# Patient Record
Sex: FEMALE | Race: WHITE | NOT HISPANIC OR LATINO | Employment: FULL TIME | URBAN - NONMETROPOLITAN AREA
[De-identification: names, ages, dates, MRNs, and addresses within clinical notes are randomized per-mention and may not be internally consistent; named-entity substitution may affect disease eponyms.]

---

## 2023-10-07 ENCOUNTER — APPOINTMENT (OUTPATIENT)
Dept: RADIOLOGY | Facility: HOSPITAL | Age: 53
End: 2023-10-07
Payer: MEDICAID

## 2023-10-07 ENCOUNTER — HOSPITAL ENCOUNTER (EMERGENCY)
Facility: HOSPITAL | Age: 53
Discharge: HOME | End: 2023-10-07
Payer: MEDICAID

## 2023-10-07 VITALS
SYSTOLIC BLOOD PRESSURE: 124 MMHG | HEART RATE: 72 BPM | RESPIRATION RATE: 18 BRPM | DIASTOLIC BLOOD PRESSURE: 91 MMHG | BODY MASS INDEX: 18.65 KG/M2 | OXYGEN SATURATION: 92 % | WEIGHT: 95 LBS | TEMPERATURE: 98.6 F | HEIGHT: 60 IN

## 2023-10-07 DIAGNOSIS — S82.891A CLOSED FRACTURE OF RIGHT ANKLE, INITIAL ENCOUNTER: ICD-10-CM

## 2023-10-07 DIAGNOSIS — S93.402A SPRAIN OF LEFT ANKLE, INITIAL ENCOUNTER: Primary | ICD-10-CM

## 2023-10-07 PROCEDURE — 73630 X-RAY EXAM OF FOOT: CPT | Mod: RIGHT SIDE | Performed by: RADIOLOGY

## 2023-10-07 PROCEDURE — 73610 X-RAY EXAM OF ANKLE: CPT | Mod: RT,FY

## 2023-10-07 PROCEDURE — 72125 CT NECK SPINE W/O DYE: CPT | Performed by: RADIOLOGY

## 2023-10-07 PROCEDURE — 73610 X-RAY EXAM OF ANKLE: CPT | Mod: LEFT SIDE | Performed by: RADIOLOGY

## 2023-10-07 PROCEDURE — 70450 CT HEAD/BRAIN W/O DYE: CPT

## 2023-10-07 PROCEDURE — 29515 APPLICATION SHORT LEG SPLINT: CPT | Mod: RT

## 2023-10-07 PROCEDURE — 73630 X-RAY EXAM OF FOOT: CPT | Mod: LEFT SIDE | Performed by: RADIOLOGY

## 2023-10-07 PROCEDURE — 70450 CT HEAD/BRAIN W/O DYE: CPT | Performed by: RADIOLOGY

## 2023-10-07 PROCEDURE — 73630 X-RAY EXAM OF FOOT: CPT | Mod: RT,FY

## 2023-10-07 PROCEDURE — 99285 EMERGENCY DEPT VISIT HI MDM: CPT | Mod: 25

## 2023-10-07 PROCEDURE — 96372 THER/PROPH/DIAG INJ SC/IM: CPT

## 2023-10-07 PROCEDURE — 71045 X-RAY EXAM CHEST 1 VIEW: CPT

## 2023-10-07 PROCEDURE — 2500000004 HC RX 250 GENERAL PHARMACY W/ HCPCS (ALT 636 FOR OP/ED): Mod: SE

## 2023-10-07 PROCEDURE — 73630 X-RAY EXAM OF FOOT: CPT | Mod: LT

## 2023-10-07 PROCEDURE — 76377 3D RENDER W/INTRP POSTPROCES: CPT | Mod: RIGHT SIDE | Performed by: RADIOLOGY

## 2023-10-07 PROCEDURE — 73700 CT LOWER EXTREMITY W/O DYE: CPT | Mod: RT

## 2023-10-07 PROCEDURE — 73610 X-RAY EXAM OF ANKLE: CPT | Mod: LT

## 2023-10-07 PROCEDURE — 73610 X-RAY EXAM OF ANKLE: CPT | Mod: RIGHT SIDE | Performed by: RADIOLOGY

## 2023-10-07 PROCEDURE — 72125 CT NECK SPINE W/O DYE: CPT

## 2023-10-07 PROCEDURE — 71045 X-RAY EXAM CHEST 1 VIEW: CPT | Performed by: RADIOLOGY

## 2023-10-07 PROCEDURE — 73700 CT LOWER EXTREMITY W/O DYE: CPT | Mod: RIGHT SIDE | Performed by: RADIOLOGY

## 2023-10-07 RX ORDER — ACETAMINOPHEN 325 MG/1
650 TABLET ORAL ONCE
Status: COMPLETED | OUTPATIENT
Start: 2023-10-07 | End: 2023-10-07

## 2023-10-07 RX ORDER — KETOROLAC TROMETHAMINE 30 MG/ML
15 INJECTION, SOLUTION INTRAMUSCULAR; INTRAVENOUS ONCE
Status: DISCONTINUED | OUTPATIENT
Start: 2023-10-07 | End: 2023-10-07 | Stop reason: HOSPADM

## 2023-10-07 RX ORDER — KETOROLAC TROMETHAMINE 15 MG/ML
INJECTION, SOLUTION INTRAMUSCULAR; INTRAVENOUS
Status: COMPLETED
Start: 2023-10-07 | End: 2023-10-07

## 2023-10-07 RX ORDER — ACETAMINOPHEN 325 MG/1
TABLET ORAL
Status: COMPLETED
Start: 2023-10-07 | End: 2023-10-07

## 2023-10-07 RX ADMIN — KETOROLAC TROMETHAMINE 15 MG: 15 INJECTION INTRAMUSCULAR; INTRAVENOUS at 13:16

## 2023-10-07 RX ADMIN — ACETAMINOPHEN 650 MG: 325 TABLET ORAL at 12:05

## 2023-10-07 ASSESSMENT — PAIN DESCRIPTION - PAIN TYPE: TYPE: ACUTE PAIN

## 2023-10-07 ASSESSMENT — PAIN DESCRIPTION - DESCRIPTORS: DESCRIPTORS: ACHING

## 2023-10-07 ASSESSMENT — COLUMBIA-SUICIDE SEVERITY RATING SCALE - C-SSRS
6. HAVE YOU EVER DONE ANYTHING, STARTED TO DO ANYTHING, OR PREPARED TO DO ANYTHING TO END YOUR LIFE?: NO
2. HAVE YOU ACTUALLY HAD ANY THOUGHTS OF KILLING YOURSELF?: NO
1. IN THE PAST MONTH, HAVE YOU WISHED YOU WERE DEAD OR WISHED YOU COULD GO TO SLEEP AND NOT WAKE UP?: NO

## 2023-10-07 ASSESSMENT — PAIN - FUNCTIONAL ASSESSMENT: PAIN_FUNCTIONAL_ASSESSMENT: 0-10

## 2023-10-07 ASSESSMENT — PAIN SCALES - GENERAL
PAINLEVEL_OUTOF10: 9

## 2023-10-07 ASSESSMENT — PAIN DESCRIPTION - FREQUENCY: FREQUENCY: CONSTANT/CONTINUOUS
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
## 2023-10-07 NOTE — ED NOTES
Radiology tests resulted, right bimalleolar ankle fracture. Plan for CT. Provider at bedside.     Geovanna Tomkpins RN  10/07/23 7053
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
## 2023-10-17 ENCOUNTER — HOSPITAL ENCOUNTER (OUTPATIENT)
Facility: HOSPITAL | Age: 53
Setting detail: OBSERVATION
Discharge: HOME | End: 2023-10-19
Attending: INTERNAL MEDICINE | Admitting: PHYSICIAN ASSISTANT
Payer: MEDICAID

## 2023-10-17 DIAGNOSIS — S82.841A BIMALLEOLAR FRACTURE OF RIGHT ANKLE, CLOSED, INITIAL ENCOUNTER: Primary | ICD-10-CM

## 2023-10-17 PROBLEM — F17.210 DEPENDENCE ON NICOTINE FROM CIGARETTES: Status: ACTIVE | Noted: 2023-06-20

## 2023-10-17 PROBLEM — F12.20 CANNABIS DEPENDENCE (MULTI): Chronic | Status: ACTIVE | Noted: 2023-05-24

## 2023-10-17 PROBLEM — J45.20 MILD INTERMITTENT ASTHMA, UNCOMPLICATED (HHS-HCC): Status: ACTIVE | Noted: 2018-06-10

## 2023-10-17 PROBLEM — F32.A DEPRESSION: Status: ACTIVE | Noted: 2022-08-04

## 2023-10-17 PROBLEM — F43.25 ADJUSTMENT DISORDER WITH MIXED DISTURBANCE OF EMOTIONS AND CONDUCT: Status: ACTIVE | Noted: 2023-05-24

## 2023-10-17 PROCEDURE — 71045 X-RAY EXAM CHEST 1 VIEW: CPT | Performed by: STUDENT IN AN ORGANIZED HEALTH CARE EDUCATION/TRAINING PROGRAM

## 2023-10-17 PROCEDURE — 2500000004 HC RX 250 GENERAL PHARMACY W/ HCPCS (ALT 636 FOR OP/ED): Performed by: PHYSICIAN ASSISTANT

## 2023-10-17 PROCEDURE — G0378 HOSPITAL OBSERVATION PER HR: HCPCS

## 2023-10-17 PROCEDURE — G0379 DIRECT REFER HOSPITAL OBSERV: HCPCS

## 2023-10-17 PROCEDURE — 87635 SARS-COV-2 COVID-19 AMP PRB: CPT | Performed by: PHYSICIAN ASSISTANT

## 2023-10-17 PROCEDURE — 1100000001 HC PRIVATE ROOM DAILY

## 2023-10-17 PROCEDURE — 2500000001 HC RX 250 WO HCPCS SELF ADMINISTERED DRUGS (ALT 637 FOR MEDICARE OP): Performed by: PHYSICIAN ASSISTANT

## 2023-10-17 RX ORDER — RISPERIDONE 1 MG/1
1 TABLET ORAL 2 TIMES DAILY
COMMUNITY

## 2023-10-17 RX ORDER — ARIPIPRAZOLE 5 MG/1
10 TABLET ORAL
Status: DISCONTINUED | OUTPATIENT
Start: 2023-10-18 | End: 2023-10-19 | Stop reason: HOSPADM

## 2023-10-17 RX ORDER — POLYETHYLENE GLYCOL 3350 17 G/17G
17 POWDER, FOR SOLUTION ORAL DAILY
Status: DISCONTINUED | OUTPATIENT
Start: 2023-10-18 | End: 2023-10-19 | Stop reason: HOSPADM

## 2023-10-17 RX ORDER — ARIPIPRAZOLE 10 MG/1
10 TABLET ORAL
COMMUNITY
Start: 2023-10-05

## 2023-10-17 RX ORDER — ACETAMINOPHEN 650 MG/1
650 SUPPOSITORY RECTAL EVERY 4 HOURS PRN
Status: DISCONTINUED | OUTPATIENT
Start: 2023-10-17 | End: 2023-10-19 | Stop reason: HOSPADM

## 2023-10-17 RX ORDER — FLUOXETINE HYDROCHLORIDE 20 MG/1
20 CAPSULE ORAL
COMMUNITY
Start: 2023-06-20

## 2023-10-17 RX ORDER — TRAZODONE HYDROCHLORIDE 50 MG/1
50 TABLET ORAL NIGHTLY PRN
Status: DISCONTINUED | OUTPATIENT
Start: 2023-10-17 | End: 2023-10-19 | Stop reason: HOSPADM

## 2023-10-17 RX ORDER — METRONIDAZOLE 500 MG/1
500 TABLET ORAL 3 TIMES DAILY
COMMUNITY
Start: 2023-04-17 | End: 2023-10-19 | Stop reason: HOSPADM

## 2023-10-17 RX ORDER — ENOXAPARIN SODIUM 100 MG/ML
40 INJECTION SUBCUTANEOUS EVERY 24 HOURS
Status: DISCONTINUED | OUTPATIENT
Start: 2023-10-19 | End: 2023-10-19 | Stop reason: HOSPADM

## 2023-10-17 RX ORDER — TALC
3 POWDER (GRAM) TOPICAL DAILY
Status: DISCONTINUED | OUTPATIENT
Start: 2023-10-17 | End: 2023-10-19 | Stop reason: HOSPADM

## 2023-10-17 RX ORDER — RISPERIDONE 1 MG/1
1 TABLET ORAL 2 TIMES DAILY
Status: DISCONTINUED | OUTPATIENT
Start: 2023-10-17 | End: 2023-10-19 | Stop reason: HOSPADM

## 2023-10-17 RX ORDER — BUSPIRONE HYDROCHLORIDE 10 MG/1
10 TABLET ORAL 2 TIMES DAILY
COMMUNITY
Start: 2023-05-03

## 2023-10-17 RX ORDER — FAMOTIDINE 20 MG/1
20 TABLET, FILM COATED ORAL 2 TIMES DAILY
Status: DISCONTINUED | OUTPATIENT
Start: 2023-10-17 | End: 2023-10-19 | Stop reason: HOSPADM

## 2023-10-17 RX ORDER — ONDANSETRON HYDROCHLORIDE 2 MG/ML
4 INJECTION, SOLUTION INTRAVENOUS EVERY 8 HOURS PRN
Status: DISCONTINUED | OUTPATIENT
Start: 2023-10-17 | End: 2023-10-19 | Stop reason: HOSPADM

## 2023-10-17 RX ORDER — ONDANSETRON 4 MG/1
4 TABLET, FILM COATED ORAL EVERY 8 HOURS PRN
Status: DISCONTINUED | OUTPATIENT
Start: 2023-10-17 | End: 2023-10-19 | Stop reason: HOSPADM

## 2023-10-17 RX ORDER — IBUPROFEN 200 MG
1 TABLET ORAL DAILY
Status: DISCONTINUED | OUTPATIENT
Start: 2023-10-18 | End: 2023-10-19 | Stop reason: HOSPADM

## 2023-10-17 RX ORDER — ACETAMINOPHEN 325 MG/1
650 TABLET ORAL EVERY 4 HOURS PRN
Status: DISCONTINUED | OUTPATIENT
Start: 2023-10-17 | End: 2023-10-19 | Stop reason: HOSPADM

## 2023-10-17 RX ORDER — FLUOXETINE HYDROCHLORIDE 20 MG/1
20 CAPSULE ORAL
Status: DISCONTINUED | OUTPATIENT
Start: 2023-10-18 | End: 2023-10-19 | Stop reason: HOSPADM

## 2023-10-17 RX ORDER — FAMOTIDINE 10 MG/ML
20 INJECTION INTRAVENOUS 2 TIMES DAILY
Status: DISCONTINUED | OUTPATIENT
Start: 2023-10-17 | End: 2023-10-19 | Stop reason: HOSPADM

## 2023-10-17 RX ORDER — ACETAMINOPHEN 160 MG/5ML
650 SOLUTION ORAL EVERY 4 HOURS PRN
Status: DISCONTINUED | OUTPATIENT
Start: 2023-10-17 | End: 2023-10-19 | Stop reason: HOSPADM

## 2023-10-17 RX ORDER — BUSPIRONE HYDROCHLORIDE 10 MG/1
10 TABLET ORAL 2 TIMES DAILY
Status: DISCONTINUED | OUTPATIENT
Start: 2023-10-17 | End: 2023-10-19 | Stop reason: HOSPADM

## 2023-10-17 RX ORDER — IBUPROFEN 800 MG/1
800 TABLET ORAL EVERY 8 HOURS PRN
COMMUNITY
Start: 2014-04-17

## 2023-10-17 RX ORDER — BACLOFEN 10 MG/1
10 TABLET ORAL DAILY PRN
Status: DISCONTINUED | OUTPATIENT
Start: 2023-10-17 | End: 2023-10-19 | Stop reason: HOSPADM

## 2023-10-17 RX ORDER — ALBUTEROL SULFATE 0.83 MG/ML
2.5 SOLUTION RESPIRATORY (INHALATION) EVERY 6 HOURS PRN
Status: DISCONTINUED | OUTPATIENT
Start: 2023-10-17 | End: 2023-10-19 | Stop reason: HOSPADM

## 2023-10-17 RX ORDER — ACETAMINOPHEN 325 MG/1
650 TABLET ORAL EVERY 4 HOURS PRN
Status: DISCONTINUED | OUTPATIENT
Start: 2023-10-17 | End: 2023-10-18

## 2023-10-17 RX ORDER — IBUPROFEN 200 MG
1 TABLET ORAL EVERY 24 HOURS
COMMUNITY
Start: 2014-04-17

## 2023-10-17 RX ORDER — GABAPENTIN 300 MG/1
300 CAPSULE ORAL EVERY 8 HOURS SCHEDULED
Status: DISCONTINUED | OUTPATIENT
Start: 2023-10-17 | End: 2023-10-19 | Stop reason: HOSPADM

## 2023-10-17 RX ORDER — NORETHINDRONE 5 MG/1
5 TABLET ORAL
COMMUNITY
Start: 2020-09-21

## 2023-10-17 RX ORDER — ACETAMINOPHEN 160 MG/5ML
650 SOLUTION ORAL EVERY 4 HOURS PRN
Status: DISCONTINUED | OUTPATIENT
Start: 2023-10-17 | End: 2023-10-18

## 2023-10-17 RX ORDER — TRAZODONE HYDROCHLORIDE 50 MG/1
1 TABLET ORAL NIGHTLY PRN
COMMUNITY
Start: 2023-10-16

## 2023-10-17 RX ORDER — GABAPENTIN 300 MG/1
300 CAPSULE ORAL 3 TIMES DAILY
COMMUNITY
Start: 2022-08-07 | End: 2023-11-05

## 2023-10-17 RX ORDER — NORETHINDRONE 5 MG/1
5 TABLET ORAL
Status: DISCONTINUED | OUTPATIENT
Start: 2023-10-18 | End: 2023-10-18

## 2023-10-17 RX ORDER — BACLOFEN 10 MG/1
10 TABLET ORAL DAILY PRN
COMMUNITY
Start: 2022-08-07

## 2023-10-17 RX ORDER — OXYCODONE HYDROCHLORIDE 5 MG/1
5 TABLET ORAL EVERY 4 HOURS PRN
Status: DISCONTINUED | OUTPATIENT
Start: 2023-10-17 | End: 2023-10-18

## 2023-10-17 RX ORDER — ACETAMINOPHEN 650 MG/1
650 SUPPOSITORY RECTAL EVERY 4 HOURS PRN
Status: DISCONTINUED | OUTPATIENT
Start: 2023-10-17 | End: 2023-10-18

## 2023-10-17 RX ADMIN — GABAPENTIN 300 MG: 300 CAPSULE ORAL at 23:48

## 2023-10-17 RX ADMIN — BUSPIRONE HYDROCHLORIDE 10 MG: 10 TABLET ORAL at 23:49

## 2023-10-17 RX ADMIN — RISPERIDONE 1 MG: 1 TABLET ORAL at 23:49

## 2023-10-17 RX ADMIN — OXYCODONE HYDROCHLORIDE 5 MG: 5 TABLET ORAL at 22:06

## 2023-10-17 RX ADMIN — Medication 3 MG: at 22:07

## 2023-10-17 RX ADMIN — FAMOTIDINE 20 MG: 20 TABLET, FILM COATED ORAL at 22:07

## 2023-10-17 SDOH — HEALTH STABILITY: PHYSICAL HEALTH: ON AVERAGE, HOW MANY MINUTES DO YOU ENGAGE IN EXERCISE AT THIS LEVEL?: 30 MIN

## 2023-10-17 SDOH — SOCIAL STABILITY: SOCIAL INSECURITY: WERE YOU ABLE TO COMPLETE ALL THE BEHAVIORAL HEALTH SCREENINGS?: YES

## 2023-10-17 SDOH — SOCIAL STABILITY: SOCIAL INSECURITY: HAVE YOU HAD THOUGHTS OF HARMING ANYONE ELSE?: NO

## 2023-10-17 SDOH — SOCIAL STABILITY: SOCIAL INSECURITY: DOES ANYONE TRY TO KEEP YOU FROM HAVING/CONTACTING OTHER FRIENDS OR DOING THINGS OUTSIDE YOUR HOME?: NO

## 2023-10-17 SDOH — SOCIAL STABILITY: SOCIAL INSECURITY: ARE THERE ANY APPARENT SIGNS OF INJURIES/BEHAVIORS THAT COULD BE RELATED TO ABUSE/NEGLECT?: NO

## 2023-10-17 SDOH — HEALTH STABILITY: MENTAL HEALTH: EXPERIENCED ANY OF THE FOLLOWING LIFE EVENTS: PHYSICAL ASSAULT

## 2023-10-17 SDOH — SOCIAL STABILITY: SOCIAL INSECURITY: DO YOU FEEL UNSAFE GOING BACK TO THE PLACE WHERE YOU ARE LIVING?: NO

## 2023-10-17 SDOH — SOCIAL STABILITY: SOCIAL INSECURITY: ARE YOU OR HAVE YOU BEEN THREATENED OR ABUSED PHYSICALLY, EMOTIONALLY, OR SEXUALLY BY ANYONE?: YES

## 2023-10-17 SDOH — SOCIAL STABILITY: SOCIAL INSECURITY: HAS ANYONE EVER THREATENED TO HURT YOUR FAMILY OR YOUR PETS?: NO

## 2023-10-17 SDOH — SOCIAL STABILITY: SOCIAL INSECURITY: DO YOU FEEL ANYONE HAS EXPLOITED OR TAKEN ADVANTAGE OF YOU FINANCIALLY OR OF YOUR PERSONAL PROPERTY?: NO

## 2023-10-17 SDOH — HEALTH STABILITY: PHYSICAL HEALTH: ON AVERAGE, HOW MANY DAYS PER WEEK DO YOU ENGAGE IN MODERATE TO STRENUOUS EXERCISE (LIKE A BRISK WALK)?: 7 DAYS

## 2023-10-17 SDOH — SOCIAL STABILITY: SOCIAL INSECURITY: ABUSE: ADULT

## 2023-10-17 ASSESSMENT — COGNITIVE AND FUNCTIONAL STATUS - GENERAL
HELP NEEDED FOR BATHING: A LITTLE
PATIENT BASELINE BEDBOUND: NO
MOBILITY SCORE: 21
WALKING IN HOSPITAL ROOM: A LITTLE
CLIMB 3 TO 5 STEPS WITH RAILING: A LOT
DRESSING REGULAR LOWER BODY CLOTHING: A LITTLE
DAILY ACTIVITIY SCORE: 22

## 2023-10-17 ASSESSMENT — ACTIVITIES OF DAILY LIVING (ADL)
ADEQUATE_TO_COMPLETE_ADL: YES
GROOMING: INDEPENDENT
JUDGMENT_ADEQUATE_SAFELY_COMPLETE_DAILY_ACTIVITIES: YES
ADEQUATE_TO_COMPLETE_ADL: YES
FEEDING YOURSELF: INDEPENDENT
LACK_OF_TRANSPORTATION: YES
TOILETING: INDEPENDENT
HEARING - LEFT EAR: FUNCTIONAL
BATHING: INDEPENDENT
DRESSING YOURSELF: INDEPENDENT
DRESSING YOURSELF: INDEPENDENT
JUDGMENT_ADEQUATE_SAFELY_COMPLETE_DAILY_ACTIVITIES: YES
WALKS IN HOME: NEEDS ASSISTANCE
BATHING: INDEPENDENT
GROOMING: INDEPENDENT
TOILETING: INDEPENDENT
PATIENT'S MEMORY ADEQUATE TO SAFELY COMPLETE DAILY ACTIVITIES?: YES
ASSISTIVE_DEVICE: EYEGLASSES;WALKER
PATIENT'S MEMORY ADEQUATE TO SAFELY COMPLETE DAILY ACTIVITIES?: YES
HEARING - RIGHT EAR: FUNCTIONAL
FEEDING YOURSELF: INDEPENDENT

## 2023-10-17 ASSESSMENT — ENCOUNTER SYMPTOMS
CHILLS: 0
FEVER: 0
ARTHRALGIAS: 1
SEIZURES: 0
SINUS PRESSURE: 0
DIZZINESS: 0
MYALGIAS: 1
APPETITE CHANGE: 0
CARDIOVASCULAR NEGATIVE: 1
PSYCHIATRIC NEGATIVE: 1
SINUS PAIN: 0
CONSTITUTIONAL NEGATIVE: 1
HEADACHES: 0
GASTROINTESTINAL NEGATIVE: 1
RESPIRATORY NEGATIVE: 1
FATIGUE: 0

## 2023-10-17 ASSESSMENT — LIFESTYLE VARIABLES
HOW MANY STANDARD DRINKS CONTAINING ALCOHOL DO YOU HAVE ON A TYPICAL DAY: 1 OR 2
AUDIT TOTAL SCORE: 1
HOW OFTEN DURING THE LAST YEAR HAVE YOU BEEN UNABLE TO REMEMBER WHAT HAPPENED THE NIGHT BEFORE BECAUSE YOU HAD BEEN DRINKING: LESS THAN MONTHLY
HAS A RELATIVE, FRIEND, DOCTOR, OR ANOTHER HEALTH PROFESSIONAL EXPRESSED CONCERN ABOUT YOUR DRINKING OR SUGGESTED YOU CUT DOWN: NO
HAVE YOU OR SOMEONE ELSE BEEN INJURED AS A RESULT OF YOUR DRINKING: NO
HOW OFTEN DURING THE LAST YEAR HAVE YOU FAILED TO DO WHAT WAS NORMALLY EXPECTED FROM YOU BECAUSE OF DRINKING: NEVER
PRESCIPTION_ABUSE_PAST_12_MONTHS: NO
AUDIT TOTAL SCORE: 5
HOW OFTEN DURING THE LAST YEAR HAVE YOU FOUND THAT YOU WERE NOT ABLE TO STOP DRINKING ONCE YOU HAD STARTED: NEVER
HOW OFTEN DURING THE LAST YEAR HAVE YOU HAD A FEELING OF GUILT OR REMORSE AFTER DRINKING: NEVER
HOW OFTEN DO YOU HAVE A DRINK CONTAINING ALCOHOL: 2-3 TIMES A WEEK
SKIP TO QUESTIONS 9-10: 0
HOW OFTEN DURING THE LAST YEAR HAVE YOU NEEDED AN ALCOHOLIC DRINK FIRST THING IN THE MORNING TO GET YOURSELF GOING AFTER A NIGHT OF HEAVY DRINKING: NEVER
SUBSTANCE_ABUSE_PAST_12_MONTHS: NO
AUDIT-C TOTAL SCORE: 4
AUDIT-C TOTAL SCORE: 4
HOW OFTEN DO YOU HAVE 6 OR MORE DRINKS ON ONE OCCASION: LESS THAN MONTHLY

## 2023-10-17 ASSESSMENT — COLUMBIA-SUICIDE SEVERITY RATING SCALE - C-SSRS
6. HAVE YOU EVER DONE ANYTHING, STARTED TO DO ANYTHING, OR PREPARED TO DO ANYTHING TO END YOUR LIFE?: NO
1. IN THE PAST MONTH, HAVE YOU WISHED YOU WERE DEAD OR WISHED YOU COULD GO TO SLEEP AND NOT WAKE UP?: NO
2. HAVE YOU ACTUALLY HAD ANY THOUGHTS OF KILLING YOURSELF?: NO

## 2023-10-17 ASSESSMENT — PAIN SCALES - GENERAL
PAINLEVEL_OUTOF10: 5 - MODERATE PAIN
PAINLEVEL_OUTOF10: 8
PAINLEVEL_OUTOF10: 8

## 2023-10-17 ASSESSMENT — PATIENT HEALTH QUESTIONNAIRE - PHQ9
1. LITTLE INTEREST OR PLEASURE IN DOING THINGS: SEVERAL DAYS
SUM OF ALL RESPONSES TO PHQ9 QUESTIONS 1 & 2: 2
2. FEELING DOWN, DEPRESSED OR HOPELESS: SEVERAL DAYS

## 2023-10-17 ASSESSMENT — PAIN DESCRIPTION - DESCRIPTORS
DESCRIPTORS: THROBBING
DESCRIPTORS: THROBBING

## 2023-10-17 ASSESSMENT — PAIN - FUNCTIONAL ASSESSMENT: PAIN_FUNCTIONAL_ASSESSMENT: 0-10

## 2023-10-18 ENCOUNTER — ANESTHESIA EVENT (OUTPATIENT)
Dept: OPERATING ROOM | Facility: HOSPITAL | Age: 53
End: 2023-10-18
Payer: MEDICAID

## 2023-10-18 ENCOUNTER — ANESTHESIA (OUTPATIENT)
Dept: OPERATING ROOM | Facility: HOSPITAL | Age: 53
End: 2023-10-18
Payer: MEDICAID

## 2023-10-18 ENCOUNTER — APPOINTMENT (OUTPATIENT)
Dept: RADIOLOGY | Facility: HOSPITAL | Age: 53
End: 2023-10-18
Payer: MEDICAID

## 2023-10-18 LAB
ALBUMIN SERPL BCP-MCNC: 3.3 G/DL (ref 3.4–5)
ALP SERPL-CCNC: 58 U/L (ref 33–110)
ALT SERPL W P-5'-P-CCNC: 9 U/L (ref 7–45)
ANION GAP SERPL CALC-SCNC: 13 MMOL/L (ref 10–20)
AST SERPL W P-5'-P-CCNC: 14 U/L (ref 9–39)
BILIRUB SERPL-MCNC: 0.3 MG/DL (ref 0–1.2)
BUN SERPL-MCNC: 12 MG/DL (ref 6–23)
CALCIUM SERPL-MCNC: 8.5 MG/DL (ref 8.6–10.3)
CHLORIDE SERPL-SCNC: 109 MMOL/L (ref 98–107)
CO2 SERPL-SCNC: 23 MMOL/L (ref 21–32)
CREAT SERPL-MCNC: 0.63 MG/DL (ref 0.5–1.05)
ERYTHROCYTE [DISTWIDTH] IN BLOOD BY AUTOMATED COUNT: 14 % (ref 11.5–14.5)
GFR SERPL CREATININE-BSD FRML MDRD: >90 ML/MIN/1.73M*2
GLUCOSE SERPL-MCNC: 89 MG/DL (ref 74–99)
HCG UR QL IA.RAPID: NEGATIVE
HCT VFR BLD AUTO: 37.5 % (ref 36–46)
HGB BLD-MCNC: 12 G/DL (ref 12–16)
MCH RBC QN AUTO: 31.4 PG (ref 26–34)
MCHC RBC AUTO-ENTMCNC: 32 G/DL (ref 32–36)
MCV RBC AUTO: 98 FL (ref 80–100)
NRBC BLD-RTO: 0 /100 WBCS (ref 0–0)
PLATELET # BLD AUTO: 375 X10*3/UL (ref 150–450)
PMV BLD AUTO: 8.1 FL (ref 7.5–11.5)
POTASSIUM SERPL-SCNC: 4.1 MMOL/L (ref 3.5–5.3)
PROT SERPL-MCNC: 5.8 G/DL (ref 6.4–8.2)
RBC # BLD AUTO: 3.82 X10*6/UL (ref 4–5.2)
SARS-COV-2 RNA RESP QL NAA+PROBE: NOT DETECTED
SODIUM SERPL-SCNC: 141 MMOL/L (ref 136–145)
WBC # BLD AUTO: 10.6 X10*3/UL (ref 4.4–11.3)

## 2023-10-18 PROCEDURE — 2500000001 HC RX 250 WO HCPCS SELF ADMINISTERED DRUGS (ALT 637 FOR MEDICARE OP): Performed by: PODIATRIST

## 2023-10-18 PROCEDURE — 36415 COLL VENOUS BLD VENIPUNCTURE: CPT | Performed by: PHYSICIAN ASSISTANT

## 2023-10-18 PROCEDURE — S4991 NICOTINE PATCH NONLEGEND: HCPCS | Performed by: PHYSICIAN ASSISTANT

## 2023-10-18 PROCEDURE — 85027 COMPLETE CBC AUTOMATED: CPT | Performed by: PHYSICIAN ASSISTANT

## 2023-10-18 PROCEDURE — 2500000001 HC RX 250 WO HCPCS SELF ADMINISTERED DRUGS (ALT 637 FOR MEDICARE OP): Performed by: PHYSICIAN ASSISTANT

## 2023-10-18 PROCEDURE — 2500000005 HC RX 250 GENERAL PHARMACY W/O HCPCS: Performed by: PODIATRIST

## 2023-10-18 PROCEDURE — 2500000001 HC RX 250 WO HCPCS SELF ADMINISTERED DRUGS (ALT 637 FOR MEDICARE OP)

## 2023-10-18 PROCEDURE — 7100000002 HC RECOVERY ROOM TIME - EACH INCREMENTAL 1 MINUTE: Performed by: PODIATRIST

## 2023-10-18 PROCEDURE — 93005 ELECTROCARDIOGRAM TRACING: CPT

## 2023-10-18 PROCEDURE — 71045 X-RAY EXAM CHEST 1 VIEW: CPT

## 2023-10-18 PROCEDURE — C1713 ANCHOR/SCREW BN/BN,TIS/BN: HCPCS | Performed by: PODIATRIST

## 2023-10-18 PROCEDURE — 93010 ELECTROCARDIOGRAM REPORT: CPT | Performed by: INTERNAL MEDICINE

## 2023-10-18 PROCEDURE — G0378 HOSPITAL OBSERVATION PER HR: HCPCS

## 2023-10-18 PROCEDURE — 3700000002 HC GENERAL ANESTHESIA TIME - EACH INCREMENTAL 1 MINUTE: Performed by: PODIATRIST

## 2023-10-18 PROCEDURE — 3600000004 HC OR TIME - INITIAL BASE CHARGE - PROCEDURE LEVEL FOUR: Performed by: PODIATRIST

## 2023-10-18 PROCEDURE — 1100000001 HC PRIVATE ROOM DAILY

## 2023-10-18 PROCEDURE — 2500000004 HC RX 250 GENERAL PHARMACY W/ HCPCS (ALT 636 FOR OP/ED): Performed by: PHYSICIAN ASSISTANT

## 2023-10-18 PROCEDURE — 80053 COMPREHEN METABOLIC PANEL: CPT | Performed by: PHYSICIAN ASSISTANT

## 2023-10-18 PROCEDURE — 3700000001 HC GENERAL ANESTHESIA TIME - INITIAL BASE CHARGE: Performed by: PODIATRIST

## 2023-10-18 PROCEDURE — 76000 FLUOROSCOPY <1 HR PHYS/QHP: CPT

## 2023-10-18 PROCEDURE — 2780000003 HC OR 278 NO HCPCS: Performed by: PODIATRIST

## 2023-10-18 PROCEDURE — 81025 URINE PREGNANCY TEST: CPT | Performed by: PHYSICIAN ASSISTANT

## 2023-10-18 PROCEDURE — 99233 SBSQ HOSP IP/OBS HIGH 50: CPT

## 2023-10-18 PROCEDURE — 3600000009 HC OR TIME - EACH INCREMENTAL 1 MINUTE - PROCEDURE LEVEL FOUR: Performed by: PODIATRIST

## 2023-10-18 PROCEDURE — 2500000002 HC RX 250 W HCPCS SELF ADMINISTERED DRUGS (ALT 637 FOR MEDICARE OP, ALT 636 FOR OP/ED): Performed by: PHYSICIAN ASSISTANT

## 2023-10-18 PROCEDURE — 2500000004 HC RX 250 GENERAL PHARMACY W/ HCPCS (ALT 636 FOR OP/ED): Performed by: NURSE ANESTHETIST, CERTIFIED REGISTERED

## 2023-10-18 PROCEDURE — 2720000007 HC OR 272 NO HCPCS: Performed by: PODIATRIST

## 2023-10-18 PROCEDURE — 7100000001 HC RECOVERY ROOM TIME - INITIAL BASE CHARGE: Performed by: PODIATRIST

## 2023-10-18 PROCEDURE — 2500000005 HC RX 250 GENERAL PHARMACY W/O HCPCS: Performed by: NURSE ANESTHETIST, CERTIFIED REGISTERED

## 2023-10-18 PROCEDURE — 2500000004 HC RX 250 GENERAL PHARMACY W/ HCPCS (ALT 636 FOR OP/ED)

## 2023-10-18 DEVICE — IMPLANTABLE DEVICE: Type: IMPLANTABLE DEVICE | Site: ANKLE | Status: FUNCTIONAL

## 2023-10-18 RX ORDER — NORETHINDRONE 5 MG/1
5 TABLET ORAL
Status: DISCONTINUED | OUTPATIENT
Start: 2023-10-18 | End: 2023-10-18

## 2023-10-18 RX ORDER — PHENYLEPHRINE HCL IN 0.9% NACL 0.4MG/10ML
SYRINGE (ML) INTRAVENOUS AS NEEDED
Status: DISCONTINUED | OUTPATIENT
Start: 2023-10-18 | End: 2023-10-18

## 2023-10-18 RX ORDER — ACETAMINOPHEN 325 MG/1
975 TABLET ORAL EVERY 8 HOURS
Status: DISCONTINUED | OUTPATIENT
Start: 2023-10-18 | End: 2023-10-19 | Stop reason: HOSPADM

## 2023-10-18 RX ORDER — KETOROLAC TROMETHAMINE 15 MG/ML
15 INJECTION, SOLUTION INTRAMUSCULAR; INTRAVENOUS EVERY 6 HOURS PRN
Status: DISCONTINUED | OUTPATIENT
Start: 2023-10-18 | End: 2023-10-19 | Stop reason: HOSPADM

## 2023-10-18 RX ORDER — OXYCODONE HYDROCHLORIDE 5 MG/1
5 TABLET ORAL EVERY 4 HOURS PRN
Status: DISCONTINUED | OUTPATIENT
Start: 2023-10-18 | End: 2023-10-19 | Stop reason: HOSPADM

## 2023-10-18 RX ORDER — BACITRACIN 500 [USP'U]/G
OINTMENT TOPICAL AS NEEDED
Status: DISCONTINUED | OUTPATIENT
Start: 2023-10-18 | End: 2023-10-18 | Stop reason: HOSPADM

## 2023-10-18 RX ORDER — GLYCOPYRROLATE 0.2 MG/ML
INJECTION INTRAMUSCULAR; INTRAVENOUS AS NEEDED
Status: DISCONTINUED | OUTPATIENT
Start: 2023-10-18 | End: 2023-10-18

## 2023-10-18 RX ORDER — CEFAZOLIN 1 G/1
INJECTION, POWDER, FOR SOLUTION INTRAVENOUS AS NEEDED
Status: DISCONTINUED | OUTPATIENT
Start: 2023-10-18 | End: 2023-10-18

## 2023-10-18 RX ORDER — PROPOFOL 10 MG/ML
INJECTION, EMULSION INTRAVENOUS AS NEEDED
Status: DISCONTINUED | OUTPATIENT
Start: 2023-10-18 | End: 2023-10-18

## 2023-10-18 RX ORDER — KETOROLAC TROMETHAMINE 30 MG/ML
INJECTION, SOLUTION INTRAMUSCULAR; INTRAVENOUS AS NEEDED
Status: DISCONTINUED | OUTPATIENT
Start: 2023-10-18 | End: 2023-10-18

## 2023-10-18 RX ORDER — SODIUM CHLORIDE, SODIUM LACTATE, POTASSIUM CHLORIDE, CALCIUM CHLORIDE 600; 310; 30; 20 MG/100ML; MG/100ML; MG/100ML; MG/100ML
100 INJECTION, SOLUTION INTRAVENOUS CONTINUOUS
Status: DISCONTINUED | OUTPATIENT
Start: 2023-10-18 | End: 2023-10-18 | Stop reason: HOSPADM

## 2023-10-18 RX ORDER — HYDROMORPHONE HYDROCHLORIDE 1 MG/ML
1 INJECTION, SOLUTION INTRAMUSCULAR; INTRAVENOUS; SUBCUTANEOUS EVERY 5 MIN PRN
Status: DISCONTINUED | OUTPATIENT
Start: 2023-10-18 | End: 2023-10-18 | Stop reason: HOSPADM

## 2023-10-18 RX ORDER — MIDAZOLAM HYDROCHLORIDE 1 MG/ML
INJECTION, SOLUTION INTRAMUSCULAR; INTRAVENOUS AS NEEDED
Status: DISCONTINUED | OUTPATIENT
Start: 2023-10-18 | End: 2023-10-18

## 2023-10-18 RX ORDER — FENTANYL CITRATE 50 UG/ML
INJECTION, SOLUTION INTRAMUSCULAR; INTRAVENOUS AS NEEDED
Status: DISCONTINUED | OUTPATIENT
Start: 2023-10-18 | End: 2023-10-18

## 2023-10-18 RX ORDER — BUPIVACAINE HCL/EPINEPHRINE 0.25-.0005
VIAL (ML) INJECTION AS NEEDED
Status: DISCONTINUED | OUTPATIENT
Start: 2023-10-18 | End: 2023-10-18 | Stop reason: HOSPADM

## 2023-10-18 RX ORDER — CEFAZOLIN 1 G/1
1 INJECTION, POWDER, FOR SOLUTION INTRAVENOUS EVERY 8 HOURS
Status: DISCONTINUED | OUTPATIENT
Start: 2023-10-19 | End: 2023-10-19

## 2023-10-18 RX ORDER — ONDANSETRON HYDROCHLORIDE 2 MG/ML
4 INJECTION, SOLUTION INTRAVENOUS ONCE AS NEEDED
Status: DISCONTINUED | OUTPATIENT
Start: 2023-10-18 | End: 2023-10-18 | Stop reason: HOSPADM

## 2023-10-18 RX ORDER — LIDOCAINE HYDROCHLORIDE 10 MG/ML
0.1 INJECTION, SOLUTION EPIDURAL; INFILTRATION; INTRACAUDAL; PERINEURAL ONCE
Status: DISCONTINUED | OUTPATIENT
Start: 2023-10-18 | End: 2023-10-18 | Stop reason: HOSPADM

## 2023-10-18 RX ORDER — HYDROCODONE BITARTRATE AND ACETAMINOPHEN 5; 325 MG/1; MG/1
1 TABLET ORAL EVERY 4 HOURS PRN
Status: DISCONTINUED | OUTPATIENT
Start: 2023-10-18 | End: 2023-10-19 | Stop reason: HOSPADM

## 2023-10-18 RX ORDER — NORETHINDRONE 5 MG/1
5 TABLET ORAL
Status: DISCONTINUED | OUTPATIENT
Start: 2023-10-19 | End: 2023-10-19 | Stop reason: HOSPADM

## 2023-10-18 RX ORDER — LIDOCAINE HYDROCHLORIDE 20 MG/ML
INJECTION, SOLUTION INFILTRATION; PERINEURAL AS NEEDED
Status: DISCONTINUED | OUTPATIENT
Start: 2023-10-18 | End: 2023-10-18

## 2023-10-18 RX ORDER — OXYCODONE HYDROCHLORIDE 5 MG/1
5 TABLET ORAL EVERY 4 HOURS PRN
Status: DISCONTINUED | OUTPATIENT
Start: 2023-10-18 | End: 2023-10-18 | Stop reason: HOSPADM

## 2023-10-18 RX ORDER — DEXAMETHASONE SODIUM PHOSPHATE 4 MG/ML
INJECTION, SOLUTION INTRA-ARTICULAR; INTRALESIONAL; INTRAMUSCULAR; INTRAVENOUS; SOFT TISSUE AS NEEDED
Status: DISCONTINUED | OUTPATIENT
Start: 2023-10-18 | End: 2023-10-18

## 2023-10-18 RX ADMIN — LIDOCAINE HYDROCHLORIDE 40 MG: 20 INJECTION, SOLUTION INFILTRATION; PERINEURAL at 19:29

## 2023-10-18 RX ADMIN — CEFAZOLIN 1 G: 330 INJECTION, POWDER, FOR SOLUTION INTRAMUSCULAR; INTRAVENOUS at 19:32

## 2023-10-18 RX ADMIN — BUSPIRONE HYDROCHLORIDE 10 MG: 10 TABLET ORAL at 09:27

## 2023-10-18 RX ADMIN — KETOROLAC TROMETHAMINE 30 MG: 30 INJECTION, SOLUTION INTRAMUSCULAR; INTRAVENOUS at 19:48

## 2023-10-18 RX ADMIN — MIDAZOLAM 2 MG: 1 INJECTION INTRAMUSCULAR; INTRAVENOUS at 19:21

## 2023-10-18 RX ADMIN — BUSPIRONE HYDROCHLORIDE 10 MG: 10 TABLET ORAL at 21:21

## 2023-10-18 RX ADMIN — ONDANSETRON 4 MG: 2 INJECTION INTRAMUSCULAR; INTRAVENOUS at 19:48

## 2023-10-18 RX ADMIN — SODIUM CHLORIDE, POTASSIUM CHLORIDE, SODIUM LACTATE AND CALCIUM CHLORIDE: 600; 310; 30; 20 INJECTION, SOLUTION INTRAVENOUS at 19:20

## 2023-10-18 RX ADMIN — FENTANYL CITRATE 50 MCG: 50 INJECTION, SOLUTION INTRAMUSCULAR; INTRAVENOUS at 19:45

## 2023-10-18 RX ADMIN — DEXAMETHASONE SODIUM PHOSPHATE 4 MG: 4 INJECTION, SOLUTION INTRAMUSCULAR; INTRAVENOUS at 19:48

## 2023-10-18 RX ADMIN — ACETAMINOPHEN 975 MG: 325 TABLET ORAL at 18:11

## 2023-10-18 RX ADMIN — ARIPIPRAZOLE 10 MG: 5 TABLET ORAL at 06:22

## 2023-10-18 RX ADMIN — Medication 3 MG: at 21:21

## 2023-10-18 RX ADMIN — FLUOXETINE 20 MG: 20 CAPSULE ORAL at 06:22

## 2023-10-18 RX ADMIN — GABAPENTIN 300 MG: 300 CAPSULE ORAL at 21:21

## 2023-10-18 RX ADMIN — OXYCODONE HYDROCHLORIDE 5 MG: 5 TABLET ORAL at 06:30

## 2023-10-18 RX ADMIN — PROPOFOL 120 MG: 10 INJECTION, EMULSION INTRAVENOUS at 19:29

## 2023-10-18 RX ADMIN — FAMOTIDINE 20 MG: 20 TABLET, FILM COATED ORAL at 21:21

## 2023-10-18 RX ADMIN — RISPERIDONE 1 MG: 1 TABLET ORAL at 09:27

## 2023-10-18 RX ADMIN — Medication 100 MCG: at 19:50

## 2023-10-18 RX ADMIN — ACETAMINOPHEN 975 MG: 325 TABLET ORAL at 12:53

## 2023-10-18 RX ADMIN — Medication 100 MCG: at 19:40

## 2023-10-18 RX ADMIN — PROPOFOL 80 MG: 10 INJECTION, EMULSION INTRAVENOUS at 19:45

## 2023-10-18 RX ADMIN — RISPERIDONE 1 MG: 1 TABLET ORAL at 21:21

## 2023-10-18 RX ADMIN — FAMOTIDINE 20 MG: 20 TABLET, FILM COATED ORAL at 09:27

## 2023-10-18 RX ADMIN — Medication 200 MCG: at 19:59

## 2023-10-18 RX ADMIN — GABAPENTIN 300 MG: 300 CAPSULE ORAL at 06:22

## 2023-10-18 RX ADMIN — GLYCOPYRROLATE 0.2 MG: 0.2 INJECTION INTRAMUSCULAR; INTRAVENOUS at 19:40

## 2023-10-18 RX ADMIN — NICOTINE 1 PATCH: 14 PATCH, EXTENDED RELEASE TRANSDERMAL at 09:26

## 2023-10-18 RX ADMIN — GABAPENTIN 300 MG: 300 CAPSULE ORAL at 14:46

## 2023-10-18 RX ADMIN — Medication 100 MCG: at 19:44

## 2023-10-18 RX ADMIN — FENTANYL CITRATE 50 MCG: 50 INJECTION, SOLUTION INTRAMUSCULAR; INTRAVENOUS at 19:35

## 2023-10-18 SDOH — HEALTH STABILITY: MENTAL HEALTH: CURRENT SMOKER: 1

## 2023-10-18 ASSESSMENT — PAIN DESCRIPTION - DESCRIPTORS: DESCRIPTORS: THROBBING

## 2023-10-18 ASSESSMENT — PAIN SCALES - GENERAL
PAINLEVEL_OUTOF10: 0 - NO PAIN
PAINLEVEL_OUTOF10: 7
PAIN_LEVEL: 0
PAINLEVEL_OUTOF10: 3
PAINLEVEL_OUTOF10: 3
PAINLEVEL_OUTOF10: 0 - NO PAIN

## 2023-10-18 ASSESSMENT — ENCOUNTER SYMPTOMS
CHEST TIGHTNESS: 0
ABDOMINAL PAIN: 0
SHORTNESS OF BREATH: 0
PALPITATIONS: 0
ACTIVITY CHANGE: 0
APPETITE CHANGE: 0
MYALGIAS: 0
DIFFICULTY URINATING: 0
DIZZINESS: 0
COUGH: 0

## 2023-10-18 NOTE — DISCHARGE INSTR - OTHER ORDERS
Thank you for choosing [facility] for your Health Care needs.  As you transition from the hospital back to home, we hope we took your preferences into account on how you manage your health needs so you can manage your health at home.  You may receive a survey in the mail within a couple weeks.  Please take the time to complete it and return it.  Your input is ALWAYS important to us.  Thank you!  Carmen Ding Debbie, & Jean  Your Care Transition team, [phone]     .
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
## 2023-10-18 NOTE — CARE PLAN
The patient's goals for the shift include      The clinical goals for the shift include decrease pain    Patient awaiting surgery today. Pain controlled with just tylenol this shift.  Remains in soft casts BLE

## 2023-10-18 NOTE — ANESTHESIA PROCEDURE NOTES
Airway  Date/Time: 10/18/2023 7:31 PM  Urgency: elective    Airway not difficult    Staffing  Performed: CRNA   Authorized by: CARLA Silva    Performed by: CARLA Silva  Patient location during procedure: OR    Indications and Patient Condition  Indications for airway management: anesthesia  Spontaneous ventilation: present  Sedation level: deep  Preoxygenated: yes  Patient position: sniffing  MILS maintained throughout  Mask difficulty assessment: 0 - not attempted    Final Airway Details  Final airway type: supraglottic airway      Successful airway: Supraglottic airway: I gel.  Size 3     Number of attempts at approach: 1
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
## 2023-10-18 NOTE — CARE PLAN
The patient's goals for the shift include  pain control.    The clinical goals for the shift include decrease pain    Over the shift, the patient did not make progress toward the following goals. Barriers to progression include patient 's lack of communication with staff when pain medication is needed. Recommendations to address these barriers include patient communicating with staff when pain medication is needed.     PAST MEDICAL HISTORY:  No pertinent past medical history

## 2023-10-18 NOTE — ANESTHESIA PREPROCEDURE EVALUATION
Patient: Eva Roca    Procedure Information       Date/Time: 10/18/23 1535    Procedures:       Open Reduction Internal Fixation Ankle (Right)      Repair Ligament Ankle/Foot (Left)    Location: GEN OR 01 / Virtual [facility] OR    Surgeons: Telly Quigley DPM            Relevant Problems   Cardiovascular (within normal limits)      Endocrine (within normal limits)      GI (within normal limits)      /Renal (within normal limits)      Neuro/Psych   (+) Anxiety   (+) Depression   (+) Seizure disorder (CMS/HCC)      Pulmonary   (+) Mild intermittent asthma, uncomplicated       Clinical information reviewed:    Allergies  Meds  Problems    OB Status           NPO Detail:  No data recorded     Physical Exam    Airway  Mallampati: II  TM distance: >3 FB  Neck ROM: full     Cardiovascular - normal exam     Dental    Pulmonary - normal exam     Abdominal - normal exam             Anesthesia Plan    ASA 3     general     The patient is a current smoker.  Patient was previously instructed to abstain from smoking on day of procedure.  Patient smoked on day of procedure.    intravenous induction   Postoperative administration of opioids is intended.  Anesthetic plan and risks discussed with patient.  Use of blood products discussed with patient who consented to blood products.    Plan discussed with CRNA.
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
## 2023-10-19 VITALS
HEART RATE: 90 BPM | BODY MASS INDEX: 18.96 KG/M2 | OXYGEN SATURATION: 96 % | HEIGHT: 60 IN | WEIGHT: 96.56 LBS | RESPIRATION RATE: 16 BRPM | TEMPERATURE: 97.9 F | DIASTOLIC BLOOD PRESSURE: 82 MMHG | SYSTOLIC BLOOD PRESSURE: 122 MMHG

## 2023-10-19 LAB
ANION GAP SERPL CALC-SCNC: 20 MMOL/L (ref 10–20)
BUN SERPL-MCNC: 16 MG/DL (ref 6–23)
CALCIUM SERPL-MCNC: 9.1 MG/DL (ref 8.6–10.3)
CHLORIDE SERPL-SCNC: 104 MMOL/L (ref 98–107)
CO2 SERPL-SCNC: 18 MMOL/L (ref 21–32)
CREAT SERPL-MCNC: 0.86 MG/DL (ref 0.5–1.05)
ERYTHROCYTE [DISTWIDTH] IN BLOOD BY AUTOMATED COUNT: 14.1 % (ref 11.5–14.5)
GFR SERPL CREATININE-BSD FRML MDRD: 81 ML/MIN/1.73M*2
GLUCOSE SERPL-MCNC: 153 MG/DL (ref 74–99)
HCT VFR BLD AUTO: 44.2 % (ref 36–46)
HGB BLD-MCNC: 13 G/DL (ref 12–16)
MCH RBC QN AUTO: 31.3 PG (ref 26–34)
MCHC RBC AUTO-ENTMCNC: 29.4 G/DL (ref 32–36)
MCV RBC AUTO: 107 FL (ref 80–100)
NRBC BLD-RTO: 0 /100 WBCS (ref 0–0)
PLATELET # BLD AUTO: 364 X10*3/UL (ref 150–450)
PMV BLD AUTO: 8.2 FL (ref 7.5–11.5)
POTASSIUM SERPL-SCNC: 4.5 MMOL/L (ref 3.5–5.3)
RBC # BLD AUTO: 4.15 X10*6/UL (ref 4–5.2)
SODIUM SERPL-SCNC: 137 MMOL/L (ref 136–145)
WBC # BLD AUTO: 9.8 X10*3/UL (ref 4.4–11.3)

## 2023-10-19 PROCEDURE — 96365 THER/PROPH/DIAG IV INF INIT: CPT | Mod: 59

## 2023-10-19 PROCEDURE — 2500000002 HC RX 250 W HCPCS SELF ADMINISTERED DRUGS (ALT 637 FOR MEDICARE OP, ALT 636 FOR OP/ED)

## 2023-10-19 PROCEDURE — 80048 BASIC METABOLIC PNL TOTAL CA: CPT

## 2023-10-19 PROCEDURE — 2500000001 HC RX 250 WO HCPCS SELF ADMINISTERED DRUGS (ALT 637 FOR MEDICARE OP)

## 2023-10-19 PROCEDURE — 2500000004 HC RX 250 GENERAL PHARMACY W/ HCPCS (ALT 636 FOR OP/ED)

## 2023-10-19 PROCEDURE — 96375 TX/PRO/DX INJ NEW DRUG ADDON: CPT

## 2023-10-19 PROCEDURE — G0378 HOSPITAL OBSERVATION PER HR: HCPCS

## 2023-10-19 PROCEDURE — 99239 HOSP IP/OBS DSCHRG MGMT >30: CPT | Performed by: NURSE PRACTITIONER

## 2023-10-19 PROCEDURE — 96372 THER/PROPH/DIAG INJ SC/IM: CPT

## 2023-10-19 PROCEDURE — 36415 COLL VENOUS BLD VENIPUNCTURE: CPT

## 2023-10-19 PROCEDURE — 85027 COMPLETE CBC AUTOMATED: CPT

## 2023-10-19 PROCEDURE — S4991 NICOTINE PATCH NONLEGEND: HCPCS

## 2023-10-19 PROCEDURE — 2500000004 HC RX 250 GENERAL PHARMACY W/ HCPCS (ALT 636 FOR OP/ED): Performed by: NURSE PRACTITIONER

## 2023-10-19 RX ORDER — HYDROCODONE BITARTRATE AND ACETAMINOPHEN 5; 325 MG/1; MG/1
1 TABLET ORAL EVERY 6 HOURS PRN
Qty: 12 TABLET | Refills: 0 | Status: SHIPPED | OUTPATIENT
Start: 2023-10-19 | End: 2023-10-22

## 2023-10-19 RX ORDER — CEFAZOLIN SODIUM 1 G/50ML
SOLUTION INTRAVENOUS
Status: COMPLETED
Start: 2023-10-19 | End: 2023-10-19

## 2023-10-19 RX ORDER — CEFAZOLIN SODIUM 1 G/50ML
1 SOLUTION INTRAVENOUS EVERY 8 HOURS
Status: DISCONTINUED | OUTPATIENT
Start: 2023-10-19 | End: 2023-10-19 | Stop reason: HOSPADM

## 2023-10-19 RX ADMIN — ACETAMINOPHEN 975 MG: 325 TABLET ORAL at 01:41

## 2023-10-19 RX ADMIN — FAMOTIDINE 20 MG: 20 TABLET, FILM COATED ORAL at 09:04

## 2023-10-19 RX ADMIN — POLYETHYLENE GLYCOL 3350 17 G: 17 POWDER, FOR SOLUTION ORAL at 09:05

## 2023-10-19 RX ADMIN — CEFAZOLIN SODIUM 1 G: 1 SOLUTION INTRAVENOUS at 11:44

## 2023-10-19 RX ADMIN — CEFAZOLIN SODIUM 1 G: 1 SOLUTION INTRAVENOUS at 04:18

## 2023-10-19 RX ADMIN — ACETAMINOPHEN 975 MG: 325 TABLET ORAL at 09:49

## 2023-10-19 RX ADMIN — ENOXAPARIN SODIUM 40 MG: 40 INJECTION SUBCUTANEOUS at 01:40

## 2023-10-19 RX ADMIN — FLUOXETINE 20 MG: 20 CAPSULE ORAL at 06:20

## 2023-10-19 RX ADMIN — BACLOFEN 10 MG: 10 TABLET ORAL at 09:03

## 2023-10-19 RX ADMIN — KETOROLAC TROMETHAMINE 15 MG: 15 INJECTION, SOLUTION INTRAMUSCULAR; INTRAVENOUS at 09:04

## 2023-10-19 RX ADMIN — GABAPENTIN 300 MG: 300 CAPSULE ORAL at 06:20

## 2023-10-19 RX ADMIN — BUSPIRONE HYDROCHLORIDE 10 MG: 10 TABLET ORAL at 09:04

## 2023-10-19 RX ADMIN — NICOTINE 1 PATCH: 14 PATCH, EXTENDED RELEASE TRANSDERMAL at 09:15

## 2023-10-19 RX ADMIN — ARIPIPRAZOLE 10 MG: 5 TABLET ORAL at 06:20

## 2023-10-19 RX ADMIN — RISPERIDONE 1 MG: 1 TABLET ORAL at 09:03

## 2023-10-19 ASSESSMENT — PAIN SCALES - PAIN ASSESSMENT IN ADVANCED DEMENTIA (PAINAD)
CONSOLABILITY: NO NEED TO CONSOLE
FACIALEXPRESSION: SMILING OR INEXPRESSIVE
BODYLANGUAGE: RELAXED
BREATHING: NORMAL

## 2023-10-19 ASSESSMENT — PAIN SCALES - GENERAL: PAINLEVEL_OUTOF10: 3

## 2023-10-19 NOTE — ANESTHESIA POSTPROCEDURE EVALUATION
Patient: Eva Roca    Procedure Summary       Date: 10/18/23 Room / Location: GEN OR 01 / Virtual [facility] OR    Anesthesia Start: 1920 Anesthesia Stop: 2017    Procedures:       Open Reduction Internal Fixation Ankle (Right: Ankle)      Repair Ligament Ankle/Foot (Left: Ankle) Diagnosis:       Bimalleolar fracture of right ankle, closed, initial encounter      (Bimalleolar fracture of right ankle, closed, initial encounter [S82.841A])    Surgeons: Telly Quigley DPM Responsible Provider: CARLA Silva    Anesthesia Type: general ASA Status: 3            Anesthesia Type: general    Vitals Value Taken Time   /87 10/18/23 2017   Temp 36.1 10/18/23 2017   Pulse 95 10/18/23 2017   Resp 10 10/18/23 2017   SpO2 99 10/18/23 2017       Anesthesia Post Evaluation    Patient location during evaluation: PACU  Patient participation: complete - patient participated  Level of consciousness: awake and alert  Pain score: 0  Pain management: adequate  Multimodal analgesia pain management approach  Airway patency: patent  Cardiovascular status: acceptable  Respiratory status: acceptable  Hydration status: acceptable        No notable events documented.

## 2023-10-19 NOTE — PROGRESS NOTES
Patient is medically ready for discharge. Patient follow up information is on discharge instructions. Discussed HHC with patient due to difficulty finding an accepting agency. Patient stated that she does not think she needs any HHC on discharge and feels that she will be capable of caring for herself at home. She would like to go to outpatient PT if Dr. Quigley would like her to. Patient will be returning home no needs. [facility] provided patient with [facility] service information.  Patient is in agreement with discharge plan.
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
## 2023-10-19 NOTE — OP NOTE
Open Reduction Internal Fixation Ankle (R), Repair Ligament Ankle/Foot (L) Operative Note     Date: 10/18/2023  OR Location: [facility]    Name: Eva Roca, : 1970, Age: 53 y.o., MRN: [id number], Sex: female    Diagnosis  Pre-op Diagnosis     * Bimalleolar fracture of right ankle, closed, initial encounter [S82.841A] Post-op Diagnosis     * Bimalleolar fracture of right ankle, closed, initial encounter [S82.841A]     Procedures  Open Reduction Internal Fixation Ankle  51091 - [place] OPEN TREATMENT BIMALLEOLAR ANKLE FRACTURE        Surgeons      * Telly Quigley - Primary    Resident/Fellow/Other Assistant:  No surgical staff documented.    Procedure Summary  Anesthesia: General  ASA: III  Anesthesia Staff: CRNA: MICHELLE Silva-CRNA  Estimated Blood Loss: 0 mL  Intra-op Medications: * No intraprocedure medications in log *           Anesthesia Record               Intraprocedure I/O Totals       None           Specimen: No specimens collected     Staff:   Circulator: Chikis Quinonez RN  Scrub Person: Tana Pillai         Drains and/or Catheters: * None in log *    Tourniquet Times:         Implants:  Implants       Type Name Action Serial No.       HERCULES SYNDEMOSIS Implanted      Screw COLAG SCREW 4.0x50mm Implanted       COLAG SCREW 4.0X34MM Implanted       COLAG SCREW 4.0X32MM Implanted       COLAG 4.0X50MM Implanted               Findings: Please see the office notes and hospital notes    Indications: Eva Roca is an 53 y.o. female who is having surgery for Bimalleolar fracture of right ankle, closed, initial encounter [S82.871A].  Please see office notes and hospital notes    The patient was seen in the preoperative area. The risks, benefits, complications, treatment options, non-operative alternatives, expected recovery and outcomes were discussed with the patient. The possibilities of reaction to medication, pulmonary aspiration, injury to surrounding structures, bleeding, recurrent  infection, the need for additional procedures, failure to diagnose a condition, and creating a complication requiring transfusion or operation were discussed with the patient. The patient concurred with the proposed plan, giving informed consent.  The site of surgery was properly noted/marked if necessary per policy. The patient has been actively warmed in preoperative area. Preoperative antibiotics have been ordered and given within 1 hours of incision. Venous thrombosis prophylaxis have been ordered including chemical prophylaxis    Procedure Details: Patient seen in preop holding.  Consents signed.  Reviewed indication risk and benefits of her severe injury.  Answered all the questions no guarantees given.  Patient brought to operative table.  IV antibiotics delivered.  Patient placed under general anesthesia.  Sterilely prepped and draped bilateral feet from the toes to the knee utilizing Hibiclens solution.    Patient had stress films placed to the left ankle and surprisingly the swelling was down on the left ankle and it was negative anterior drawer test and negative inversion and the ankle was stable.    We placed the patient in a multilayer compressive device on the left ankle.    We now were able to see the bimalleolar minimally displaced intra-articular right ankle fracture.  We are able to make small incisions on the medial aspect along the medial malleolus.  And 3 screws were applied 4.0 cannulated screws from [employer].  1 went from the medial malleolus distally to proximally.  1 1 transversely and 1 went slightly oblique superiorly.  The screws measured 50 mm 34 mm and 32 mm.  Excellent compression of the shear fracture of the medial malleolus was created and we had good compression of the fracture line    We made a small incision on the distal tip of the fibula.  And were able to insert a K wire.  All K wires were placed with these cannulated screws.  And were able to place a 50 mm 4.0 [employer] cannulated  screw compressing the transverse fibular fracture.  We stressed the ankle and it was stable.  Test was performed on the fibula and syndesmosis was intact.  We removed all the wires.  Irrigated with saline solution.  Closed the wound with 3-0 Prolene suture.  A sterile dressing was applied.  0.5% Marcaine with epinephrine was dispensed throughout the ankle joint.  And a compressive device dressing was applied.  Patient will go into a walking boot tomorrow.  Patient tolerated procedure anesthesia well.  Vital signs stable neuro vas exam intact patient be discharged to PACU in good condition then to the floor and discharge tomorrow.      Complications:  None; patient tolerated the procedure well.    Disposition: PACU - hemodynamically stable.  Condition: stable         Additional Details: No additional information    Attending Attestation:     Telly Quigley  Phone Number: [phone]

## 2023-10-19 NOTE — CARE PLAN
The patient's goals for the shift include      The clinical goals for the shift include p/t will be free of intolerable pain    Had surgical ankle repair, VSS, abx started

## 2023-10-19 NOTE — POST-PROCEDURE NOTE
2029 resting in bed comfortably.   2043 tolerating snacks and PO fluids  2045 Dr Quigley at bedside  2052 report called to med surg RN

## 2023-10-19 NOTE — CARE PLAN
The patient's goals for the shift include      The clinical goals for the shift include Patient will be free of intorable pain taken    Over the shift, the patient did not fall and pain was tolerated with elevation and medication

## 2023-10-23 ENCOUNTER — HOSPITAL ENCOUNTER (OUTPATIENT)
Dept: CARDIOLOGY | Facility: HOSPITAL | Age: 53
Discharge: HOME | End: 2023-10-23
Payer: MEDICAID

## 2023-10-23 LAB
ATRIAL RATE: 66 BPM
P AXIS: 47 DEGREES
P OFFSET: 197 MS
P ONSET: 154 MS
PR INTERVAL: 144 MS
Q ONSET: 226 MS
QRS COUNT: 11 BEATS
QRS DURATION: 72 MS
QT INTERVAL: 420 MS
QTC CALCULATION(BAZETT): 440 MS
QTC FREDERICIA: 434 MS
R AXIS: 5 DEGREES
T AXIS: 31 DEGREES
T OFFSET: 436 MS
VENTRICULAR RATE: 66 BPM

## 2024-03-12 ENCOUNTER — APPOINTMENT (OUTPATIENT)
Dept: RADIOLOGY | Facility: HOSPITAL | Age: 54
End: 2024-03-12
Payer: MEDICAID

## 2024-03-12 ENCOUNTER — HOSPITAL ENCOUNTER (EMERGENCY)
Facility: HOSPITAL | Age: 54
Discharge: HOME | End: 2024-03-12
Payer: MEDICAID

## 2024-03-12 ENCOUNTER — APPOINTMENT (OUTPATIENT)
Dept: CARDIOLOGY | Facility: HOSPITAL | Age: 54
End: 2024-03-12
Payer: MEDICAID

## 2024-03-12 VITALS
TEMPERATURE: 97.6 F | RESPIRATION RATE: 16 BRPM | BODY MASS INDEX: 17.67 KG/M2 | HEART RATE: 96 BPM | WEIGHT: 90 LBS | SYSTOLIC BLOOD PRESSURE: 118 MMHG | OXYGEN SATURATION: 99 % | DIASTOLIC BLOOD PRESSURE: 86 MMHG | HEIGHT: 60 IN

## 2024-03-12 DIAGNOSIS — M54.2 NECK PAIN: Primary | ICD-10-CM

## 2024-03-12 DIAGNOSIS — M62.838 MUSCLE SPASM: ICD-10-CM

## 2024-03-12 DIAGNOSIS — M54.10 RADICULOPATHY AFFECTING UPPER EXTREMITY: ICD-10-CM

## 2024-03-12 LAB
ALBUMIN SERPL BCP-MCNC: 4.3 G/DL (ref 3.4–5)
ALP SERPL-CCNC: 78 U/L (ref 33–110)
ALT SERPL W P-5'-P-CCNC: 12 U/L (ref 7–45)
ANION GAP SERPL CALC-SCNC: 9 MMOL/L (ref 10–20)
AST SERPL W P-5'-P-CCNC: 17 U/L (ref 9–39)
ATRIAL RATE: 85 BPM
BASOPHILS # BLD AUTO: 0.13 X10*3/UL (ref 0–0.1)
BASOPHILS NFR BLD AUTO: 0.9 %
BILIRUB SERPL-MCNC: 0.3 MG/DL (ref 0–1.2)
BUN SERPL-MCNC: 13 MG/DL (ref 6–23)
CALCIUM SERPL-MCNC: 9.8 MG/DL (ref 8.6–10.3)
CARDIAC TROPONIN I PNL SERPL HS: 5 NG/L (ref 0–13)
CHLORIDE SERPL-SCNC: 104 MMOL/L (ref 98–107)
CO2 SERPL-SCNC: 28 MMOL/L (ref 21–32)
CREAT SERPL-MCNC: 0.68 MG/DL (ref 0.5–1.05)
EGFRCR SERPLBLD CKD-EPI 2021: >90 ML/MIN/1.73M*2
EOSINOPHIL # BLD AUTO: 0.34 X10*3/UL (ref 0–0.7)
EOSINOPHIL NFR BLD AUTO: 2.3 %
ERYTHROCYTE [DISTWIDTH] IN BLOOD BY AUTOMATED COUNT: 14.1 % (ref 11.5–14.5)
GLUCOSE SERPL-MCNC: 76 MG/DL (ref 74–99)
HCT VFR BLD AUTO: 42 % (ref 36–46)
HGB BLD-MCNC: 13.8 G/DL (ref 12–16)
IMM GRANULOCYTES # BLD AUTO: 0.06 X10*3/UL (ref 0–0.7)
IMM GRANULOCYTES NFR BLD AUTO: 0.4 % (ref 0–0.9)
LYMPHOCYTES # BLD AUTO: 3.05 X10*3/UL (ref 1.2–4.8)
LYMPHOCYTES NFR BLD AUTO: 20.9 %
MCH RBC QN AUTO: 30.7 PG (ref 26–34)
MCHC RBC AUTO-ENTMCNC: 32.9 G/DL (ref 32–36)
MCV RBC AUTO: 94 FL (ref 80–100)
MONOCYTES # BLD AUTO: 1.09 X10*3/UL (ref 0.1–1)
MONOCYTES NFR BLD AUTO: 7.5 %
NEUTROPHILS # BLD AUTO: 9.94 X10*3/UL (ref 1.2–7.7)
NEUTROPHILS NFR BLD AUTO: 68 %
NRBC BLD-RTO: 0 /100 WBCS (ref 0–0)
P AXIS: 50 DEGREES
P OFFSET: 201 MS
P ONSET: 155 MS
PLATELET # BLD AUTO: 334 X10*3/UL (ref 150–450)
POTASSIUM SERPL-SCNC: 4.1 MMOL/L (ref 3.5–5.3)
PR INTERVAL: 144 MS
PROT SERPL-MCNC: 7.1 G/DL (ref 6.4–8.2)
Q ONSET: 227 MS
QRS COUNT: 14 BEATS
QRS DURATION: 70 MS
QT INTERVAL: 390 MS
QTC CALCULATION(BAZETT): 464 MS
QTC FREDERICIA: 438 MS
R AXIS: -13 DEGREES
RBC # BLD AUTO: 4.49 X10*6/UL (ref 4–5.2)
SODIUM SERPL-SCNC: 137 MMOL/L (ref 136–145)
T AXIS: 28 DEGREES
T OFFSET: 422 MS
VENTRICULAR RATE: 85 BPM
WBC # BLD AUTO: 14.6 X10*3/UL (ref 4.4–11.3)

## 2024-03-12 PROCEDURE — 80053 COMPREHEN METABOLIC PANEL: CPT | Performed by: PHYSICIAN ASSISTANT

## 2024-03-12 PROCEDURE — 99285 EMERGENCY DEPT VISIT HI MDM: CPT | Mod: 25

## 2024-03-12 PROCEDURE — 2500000004 HC RX 250 GENERAL PHARMACY W/ HCPCS (ALT 636 FOR OP/ED): Mod: SE | Performed by: PHYSICIAN ASSISTANT

## 2024-03-12 PROCEDURE — 96361 HYDRATE IV INFUSION ADD-ON: CPT

## 2024-03-12 PROCEDURE — 84484 ASSAY OF TROPONIN QUANT: CPT | Performed by: PHYSICIAN ASSISTANT

## 2024-03-12 PROCEDURE — 72125 CT NECK SPINE W/O DYE: CPT | Performed by: RADIOLOGY

## 2024-03-12 PROCEDURE — 71045 X-RAY EXAM CHEST 1 VIEW: CPT | Mod: FOREIGN READ | Performed by: RADIOLOGY

## 2024-03-12 PROCEDURE — 36415 COLL VENOUS BLD VENIPUNCTURE: CPT | Performed by: PHYSICIAN ASSISTANT

## 2024-03-12 PROCEDURE — 96374 THER/PROPH/DIAG INJ IV PUSH: CPT

## 2024-03-12 PROCEDURE — 93005 ELECTROCARDIOGRAM TRACING: CPT

## 2024-03-12 PROCEDURE — 71045 X-RAY EXAM CHEST 1 VIEW: CPT

## 2024-03-12 PROCEDURE — 72125 CT NECK SPINE W/O DYE: CPT

## 2024-03-12 PROCEDURE — 85025 COMPLETE CBC W/AUTO DIFF WBC: CPT | Performed by: PHYSICIAN ASSISTANT

## 2024-03-12 RX ORDER — PREDNISONE 10 MG/1
20 TABLET ORAL 2 TIMES DAILY
Qty: 14 TABLET | Refills: 0 | Status: SHIPPED | OUTPATIENT
Start: 2024-03-12 | End: 2024-03-19

## 2024-03-12 RX ORDER — KETOROLAC TROMETHAMINE 15 MG/ML
15 INJECTION, SOLUTION INTRAMUSCULAR; INTRAVENOUS ONCE
Status: COMPLETED | OUTPATIENT
Start: 2024-03-12 | End: 2024-03-12

## 2024-03-12 RX ORDER — CYCLOBENZAPRINE HCL 10 MG
10 TABLET ORAL 3 TIMES DAILY PRN
Qty: 15 TABLET | Refills: 0 | Status: SHIPPED | OUTPATIENT
Start: 2024-03-12 | End: 2024-03-17

## 2024-03-12 RX ADMIN — KETOROLAC TROMETHAMINE 15 MG: 15 INJECTION INTRAMUSCULAR; INTRAVENOUS at 13:25

## 2024-03-12 RX ADMIN — SODIUM CHLORIDE 500 ML: 9 INJECTION, SOLUTION INTRAVENOUS at 13:59

## 2024-03-12 ASSESSMENT — COLUMBIA-SUICIDE SEVERITY RATING SCALE - C-SSRS
1. IN THE PAST MONTH, HAVE YOU WISHED YOU WERE DEAD OR WISHED YOU COULD GO TO SLEEP AND NOT WAKE UP?: NO
6. HAVE YOU EVER DONE ANYTHING, STARTED TO DO ANYTHING, OR PREPARED TO DO ANYTHING TO END YOUR LIFE?: NO
2. HAVE YOU ACTUALLY HAD ANY THOUGHTS OF KILLING YOURSELF?: NO

## 2024-03-12 ASSESSMENT — PAIN - FUNCTIONAL ASSESSMENT: PAIN_FUNCTIONAL_ASSESSMENT: 0-10

## 2024-03-12 ASSESSMENT — PAIN DESCRIPTION - PROGRESSION: CLINICAL_PROGRESSION: NOT CHANGED

## 2024-03-12 ASSESSMENT — PAIN SCALES - GENERAL: PAINLEVEL_OUTOF10: 7

## 2024-03-12 ASSESSMENT — PAIN DESCRIPTION - FREQUENCY: FREQUENCY: CONSTANT/CONTINUOUS

## 2024-03-12 ASSESSMENT — PAIN DESCRIPTION - DESCRIPTORS: DESCRIPTORS: ACHING;RADIATING;SHOOTING

## 2024-03-12 ASSESSMENT — PAIN DESCRIPTION - ONSET: ONSET: AWAKENED FROM SLEEP

## 2024-03-12 NOTE — ED PROVIDER NOTES
"HPI   Chief Complaint   Patient presents with    Neck Pain     With stiffness, denies fever, endorses chlls and night sweats (I'm in menopause)        53-year-old female here with complaints of \"numbness\" to her arms and torso  Along with neck stiffness per patient but when asked specifically what she means by \"stiffness\" she states neck pain with any type of movement    Upon reviewing of her chart she does see pain management for neck and back pain she has had CAT scans and MRIs and is scheduled for an MRI this next week because of worsening symptoms    When asked the patient about this she states yes the symptoms have been progressing it appeared worse today  Also noted notes where she has had numbness and tingling to her arms    Again asked patient about this and she states yes but it appears worse today  She is on Neurontin denies any other pain medication    Did review patient's OARRS report that does show that currently she is just on the Neurontin                          Aileen Coma Scale Score: 15                     Patient History   Past Medical History:   Diagnosis Date    Arthritis     COPD (chronic obstructive pulmonary disease) (CMS/[facility])     Disease of thyroid gland      No past surgical history on file.  No family history on file.  Social History     Tobacco Use    Smoking status: Every Day     Packs/day: 0.50     Years: 33.00     Additional pack years: 0.00     Total pack years: 16.50     Types: Cigarettes    Smokeless tobacco: Not on file   Vaping Use    Vaping Use: Not on file   Substance Use Topics    Alcohol use: Yes     Comment: mixed drinks with vodka 3 drinks/week    Drug use: Never     Frequency: 7.0 times per week     Types: Marijuana       Physical Exam   ED Triage Vitals [03/12/24 1220]   Temperature Heart Rate Respirations BP   36.4 °C (97.6 °F) 89 18 (!) 148/119      Pulse Ox Temp Source Heart Rate Source Patient Position   100 % Oral -- --      BP Location FiO2 (%)     -- --   "     Physical Exam  Vitals and nursing note reviewed.   Constitutional:       General: She is not in acute distress.     Appearance: Normal appearance. She is well-developed.   HENT:      Head: Normocephalic and atraumatic.      Right Ear: Tympanic membrane, ear canal and external ear normal.      Left Ear: Tympanic membrane, ear canal and external ear normal.      Nose: Nose normal.      Mouth/Throat:      Mouth: Mucous membranes are moist.   Eyes:      Conjunctiva/sclera: Conjunctivae normal.   Cardiovascular:      Rate and Rhythm: Normal rate and regular rhythm.      Heart sounds: No murmur heard.  Pulmonary:      Effort: Pulmonary effort is normal. No respiratory distress.      Breath sounds: Normal breath sounds.   Abdominal:      Palpations: Abdomen is soft.      Tenderness: There is no abdominal tenderness.   Musculoskeletal:         General: No swelling.      Cervical back: Neck supple.      Comments: Moving upper and lower extremities without any acute difficulty  Sensation equal bilateral upper and lower extremities  Patient states feels like the sensation is diminished compared to what it had been previously    But when asked to clarify this she was unable  She does have ability to differentiate between light touch increased pressure or painful stimuli  Breathing without any difficulty   the abdomen and upper torso same sensory to light touch pressure and painful stimuli as the upper extremities   Skin:     General: Skin is warm and dry.      Capillary Refill: Capillary refill takes less than 2 seconds.   Neurological:      General: No focal deficit present.      Mental Status: She is alert and oriented to person, place, and time.   Psychiatric:         Mood and Affect: Mood normal.         ED Course & MDM   Diagnoses as of 03/12/24 1456   Neck pain   Radiculopathy affecting upper extremity   Muscle spasm       Medical Decision Making  Reviewed reviewed MRI results from August 29, 2019 compared to CT  cervical spine results today I do not see any acute changes patient does have mild to severe spinal canal stenosis but I do not see any acute changes on the reports    No acute neurological changes including sensory on exam  Will treat as cervical strain radiculopathy advised patient that she needs close follow-up with her neurologist    Because she was having the numbness tingling sensation in her chest did do cardiac testing that was negative also        Amount and/or Complexity of Data Reviewed  ECG/medicine tests: independent interpretation performed.     Details: EKG done at 244 shows sinus rhythm rate of 85 Axis normal inverted T waves V1,  QRS 78 QT/QTc 390/464        Procedure  Procedures     Afsaneh Deleon PA-C  03/12/24 7225

## 2024-03-12 NOTE — DISCHARGE INSTRUCTIONS
Acute on chronic neck pain, you need to keep your follow-up appointments with her neurologist for further eval and treatment  I did review your MRI of the cervical spine that was done August 29, 2019 with the CT results today    If your symptoms get worse or any other symptoms develop advised that you get reevaluated/rechecked

## 2024-06-10 ENCOUNTER — EVALUATION (OUTPATIENT)
Dept: OCCUPATIONAL THERAPY | Facility: HOSPITAL | Age: 54
End: 2024-06-10
Payer: MEDICAID

## 2024-06-10 ENCOUNTER — EVALUATION (OUTPATIENT)
Dept: PHYSICAL THERAPY | Facility: HOSPITAL | Age: 54
End: 2024-06-10
Payer: MEDICAID

## 2024-06-10 DIAGNOSIS — M47.12 OTHER SPONDYLOSIS WITH MYELOPATHY, CERVICAL REGION: Primary | ICD-10-CM

## 2024-06-10 DIAGNOSIS — Z98.1 ARTHRODESIS STATUS: ICD-10-CM

## 2024-06-10 DIAGNOSIS — R29.898 WEAKNESS OF BOTH HANDS: Primary | ICD-10-CM

## 2024-06-10 PROCEDURE — 97166 OT EVAL MOD COMPLEX 45 MIN: CPT | Mod: GO | Performed by: OCCUPATIONAL THERAPIST

## 2024-06-10 PROCEDURE — 97110 THERAPEUTIC EXERCISES: CPT | Mod: GP | Performed by: PHYSICAL THERAPIST

## 2024-06-10 PROCEDURE — 97161 PT EVAL LOW COMPLEX 20 MIN: CPT | Mod: GP | Performed by: PHYSICAL THERAPIST

## 2024-06-10 ASSESSMENT — ENCOUNTER SYMPTOMS
LOSS OF SENSATION IN FEET: 1
OCCASIONAL FEELINGS OF UNSTEADINESS: 0
DEPRESSION: 1

## 2024-06-10 ASSESSMENT — ACTIVITIES OF DAILY LIVING (ADL)
BATHING_ASSISTANCE: INDEPENDENT
ADL_ASSISTANCE: INDEPENDENT

## 2024-06-10 ASSESSMENT — PAIN - FUNCTIONAL ASSESSMENT
PAIN_FUNCTIONAL_ASSESSMENT: 0-10
PAIN_FUNCTIONAL_ASSESSMENT: 0-10

## 2024-06-10 ASSESSMENT — PAIN SCALES - GENERAL
PAINLEVEL_OUTOF10: 3
PAINLEVEL_OUTOF10: 3

## 2024-06-10 NOTE — LETTER
Marianne 10, 2024    Edwardo Adorno MD  [address], [address]  [facility] [address]    Patient: Eva Roca   YOB: 1970   Date of Visit: 6/10/2024       Dear No referring provider defined for this encounter.    The attached plan of care is being sent to you because your patient’s medical reimbursement requires that you certify the plan of care. Your signature is required to allow uninterrupted insurance coverage.      You may indicate your approval by signing below and faxing this form back to us at Dept Fax: [phone].    Please call Dept: [phone] with any questions or concerns.    Thank you for this referral,        Ami Felix OT  [place] [address] [facility] [address] [phone]    Payer: Payor: [employer] / Plan: [employer] / Product Type: *No Product type* /                                                                         Date:     Dear Ami Felix OT,     Re: Ms. Eva Roca, MRN:[id number]    I certify that I have reviewed the attached plan of care and it is medically necessary for Ms. Eva Roca (1970) who is under my care.          ______________________________________                    _________________  Provider name and credentials                                           Date and time                                                                                           Plan of Care 6/10/24   Effective from: 6/10/2024  Effective to: 7/12/2024    Plan ID: [id number]            Participants as of Finalize on 6/10/2024    Name Type Comments Contact Info    ROVERTO Cantu PCP - General  [phone]    Ami Felix OT Occupational Therapist  [phone]    Edwardo Adorno MD Consulting Physician  [phone]    ROVERTO Recio Physician Assistant         Last Plan Note     Author: Ami Felix OT Status: Sign when  Signing Visit Last edited: 6/10/2024  1:45 PM         Occupational Therapy  Occupational Therapy Evaluation    Patient Name: Eva Roca  MRN: [id number]  : 1970  Today's Date: 6/10/2024  Time Calculation  Start Time: 1347  Stop Time: 1430  Time Calculation (min): 43 min  Today's Charges:  OT Evaluation Time Entry  OT Evaluation (Moderate) Time Entry: 43    Current Problem  Problem List Items Addressed This Visit             ICD-10-CM    Weakness of both hands - Primary R29.898    Relevant Orders    Follow Up In Occupational Therapy     Insurance  Payor: [employer] / Plan: [employer] / Product Type: *No Product type* /     Assessment  OT Assessment  OT Assessment Results: Decreased ADL status, Decreased upper extremity strength, Decreased endurance, Decreased sensation, Decreased fine motor control, Decreased gross motor control, Decreased IADLs  Strengths: Ability to acquire knowledge, Attitude of self, Rehab experience  Clinical Presentation: Evolving with changing characteristics  Pt. Tolerated session satisfactorily.  Patient is a 53 yo female  s/p cervical neck surgery resulting in hand weakness, numbness, limited participation in ADLs and inability to perform at their prior level of function. Pt. Reports difficulty doing her hair, cutting meals, and gripping objects. Pt. Displays decreased  strength, sensation, and rapid changing ROM. Pt would benefit from occupational therapy to address the above impairments in order to return to safe and pain-free ADLs and prior level of function.    Plan  Outpatient Plan  OT Plan: ; tendon glides, putty  Frequency: 2x/wk  Duration: 4 weeks  Onset Date: 24  Certification Period Start Date: 06/10/24  Certification Period End Date: 24  Number of Treatments Authorized: 8; 30 total  Rehab Potential: Good  Plan of Care Agreement: Patient  Planned Interventions: Therapeutic Exercise (85659), Therapeutic  "Activity (86105), Self-Care/Home Management (72912), Manual Therapy (56460), Neuromuscular Re-education (55341), Electrical Stimulation (94209), Kinesiotaping, Hot Packs, and Cold Packs    General  OT Last Visit  OT Received On: 06/10/24  Reason for Referral: hand weakness s/p neck surgery  C2-C7  Referred By: Tila,  Family/Caregiver Present: No  Previous Tests/Imaging): x ray, MRI, CT  Medical History Form: Reviewed and scanned into chart   Previous Interventions/Treatments: Physical Therapy/Occupational Therapy  Primary Language: English  Preferred Learning Style: visual, kinesthetic    Red Flags:   Do you have any of the following? Yes - falls; muscle weakness  Osteoporosis    Balance Difficulties/Falls  H/o CA Fever/chills   AM Stiffness Pacemaker Unexplained Weight Changes  Dizziness/Fainting     Unexplained Change in Bowel or Bladder Functions   Unexplained Malaise or Muscle Weakness  Night Pain/Sweats       Social Determinants of Health issues: No  Money  Unemployed/ work conditions  Education/Literacy Childhood experiences   Physical home environment Social supports/coping skills Healthy behaviors Access to healthcare     Subjective  General Comment: \"I am having a lot of hand weakness and difficulty grasping.  Current condition since injury: Better    Pt.'s goals for therapy: increase strength, dexterity in hands    Precautions  Precautions  STEADI Fall Risk Score (The score of 4 or more indicates an increased risk of falling): 7  Post-Surgical Precautions: Spinal precautions    Pain  Pain Assessment  Pain Assessment: 0-10  Pain Score: 3  Pain Type: Acute pain  Pain Location: Neck  As a result of this session, pt. reported pain remained the same.  Aggravating Factors: Lifting/Carrying , Pulling/Pushing , and Repetitive Motion  Relieving Factors: Rest, Ice, and Heat    Cognition  Cognition  Overall Cognitive Status: Within Functional Limits    Prior Level of Function/Home Living   Prior Function Per " "Pt/Caregiver Report  Level of [place]: Independent with ADLs and functional transfers, Independent with homemaking with ambulation  ADL Assistance: Independent  Homemaking Assistance: Independent (boyfriend does laundry, litter box)  Ambulatory Assistance: Independent  Hand Dominance: Right  IADL History  Homemaking Responsibilities: Yes  Meal Prep Responsibility: Primary  Laundry Responsibility: Secondary  Cleaning Responsibility: Primary  Bill Paying/Finance Responsibility: Primary  Shopping Responsibility: Primary  Current License: Yes  Occupation: Other (Comment) (off d/t surgery)  Type of Occupation:   Home Living  Type of Home: Apartment  Lives With: Alone  Home Layout: One level  Home Access: Ramped entrance ('walk up a hill\")  Bathroom Shower/Tub: Tub/shower unit  Bathroom Equipment: Grab bars in shower  Concerns with home environment? No    Current ADL/IADL Function  ADL Function  ADL  Eating Assistance: Independent (cutting is hard d/t pressure - using utensils is fine.)  Grooming Assistance: Independent (6/10 difficulty just starting to complete this week)  Bathing Assistance: Independent (able to complete with extra time, washing and rinsing hair is most difficulty)  UE Dressing Assistance: Independent (difficulty using bigger shirts)  LE Dressing Assistance: Independent  Toileting Assistance with Device: Independent  IADL Function  Reports she is completing herself has difficulty with some tasks and has help from her boyfriend.     Coordination  Coordination  Movements are Fluid and Coordinated: No    Hand Function  Hand Function  Gross Grasp: Functional  Coordination: Functional    ROM/Strength  RUE   RUE : Within Functional Limits     LUE   LUE: Within Functional Limits    Treatment  This therapist instructed and demonstrated interventions to patient, patient completed the following under direct supervision of this therapist:  Therapeutic Exercise  Therapeutic Exercise Performed: Yes " ROM and strengthening exercises completed this date. Pt. engaged in tendon glides x 10 reps. Handout provided.  Educated patient on putty exercises to continue at home, this was started by home health.     Education  Education  Individual(s) Educated: Patient  Education Provided: Anatomy & Physiology, Diagnosis & Precautions, Risk and benefits of OT discussed with patient or other, POC discussed and agreed upon  Home Program: Tendon gliding, Strengthening, Handout issued  Risk and Benefits Discussed with Patient/Caregiver/Other: yes  Patient/Caregiver Demonstrated Understanding: yes  Plan of Care Discussed and Agreed Upon: yes  Patient Response to Education: Patient/Caregiver Verbalized Understanding of Information, Patient/Caregiver Performed Return Demonstration of Exercises/Activities, Patient/Caregiver Asked Appropriate Questions    HEP Provided Today: Yes - tendon glides, putty to continue at home.   Access Code: [email or web address]  URL: [email or web address]  Date: 06/10/2024  Prepared by: Ami Felix    Exercises  - Rolling Putty on Table  - 1 x daily - 7 x weekly - 3 sets - 10 reps  - 3-Point Pinch with Putty  - 1 x daily - 7 x weekly - 3 sets - 10 reps  - Tip Pinch with Putty  - 1 x daily - 7 x weekly - 3 sets - 10 reps  - Thumb Opposition with Putty  - 1 x daily - 7 x weekly - 3 sets - 10 reps  - Putty Squeezes  - 1 x daily - 7 x weekly - 3 sets - 10 reps  - Finger Abduction with Putty  - 1 x daily - 7 x weekly - 3 sets - 10 reps  - Finger Extension with Putty  - 1 x daily - 7 x weekly - 3 sets - 10 reps  - Finger Adduction with Putty  - 1 x daily - 7 x weekly - 3 sets - 10 reps  - Key Pinch with Putty  - 1 x daily - 7 x weekly - 3 sets - 10 reps  - Finger Lumbricals with Putty  - 1 x daily - 7 x weekly - 3 sets - 10 reps  - Hand AROM Tendon Gliding Series  - 2 x daily - 7 x weekly - 1 sets - 10 reps  - Tendon Gliding Fist Series: Neutral Hand Position Progression  - 2 x daily - 7 x weekly - 1  sets - 10 reps  - Seated Wrist Flexor Full Fist Tendon Gliding  - 2 x daily - 7 x weekly - 1 sets - 10 reps  - Hand AROM Lumbrical  - 2 x daily - 7 x weekly - 1 sets - 10 reps  - Seated Digit Tendon Gliding  - 2 x daily - 7 x weekly - 1 sets - 10 reps    Outcome Measures  OT Adult Other Outcome Measures  9 Hole Peg Test: RUE: 2 min 23s; LUE: 1 min 3s  Other Outcome Measures: QuickDASH:[id number] (out of 9 answered questions)  Goals  Active       OT Goals       Pt. will demonstrate MI with stating and demonstrating home exercise program in order to improve patient's ability to perform self-care, household, and/or leisure tasks.        Start:  06/10/24    Expected End:  06/28/24            Pt. will increase BUE hand strength to increase participation in self-care, household, and leisure tasks. : 5/5; 35lbs        Start:  06/10/24    Expected End:  07/12/24            Pt. will demo increased BUE fine motor ability as evidenced by performance on box and blocks test and 9 hole peg test RUE: 1min; LUE: 35s       Start:  06/10/24    Expected End:  07/12/24            Pt will report decreased numbness in bilateral hands in 75 % of all treatment sessions to improve sensation while completing daily tasks.        Start:  06/10/24    Expected End:  07/12/24            Pt. will identify and apply compensation techniques with independence to reduce injury secondary to sensation deficits in BUE.        Start:  06/10/24    Expected End:  07/12/24                Ami Felix OTR/L          Current Participants as of 6/10/2024    Name Type Comments Contact Info    ROVERTO Cantu PCP - General  [phone]    Signature pending    Ami Felix OT Occupational Therapist  [phone]    Signature pending    Edwardo Adorno MD Consulting Physician  [phone]    Signature pending    ROVERTO Recio Physician Assistant      Signature pending

## 2024-06-10 NOTE — LETTER
Marianne 10, 2024    ROVERTO Cantu  [address] [facility] [address]    Patient: Eva Roca   YOB: 1970   Date of Visit: 6/10/2024       Dear No referring provider defined for this encounter.    The attached plan of care is being sent to you because your patient’s medical reimbursement requires that you certify the plan of care. Your signature is required to allow uninterrupted insurance coverage.      You may indicate your approval by signing below and faxing this form back to us at Dept Fax: [phone].    Please call Dept: [phone] with any questions or concerns.    Thank you for this referral,        Ami Felix OT  [place] [id number] [facility] [address] [phone]    Payer: Payor: [employer] / Plan: [employer] / Product Type: *No Product type* /                                                                         Date:     Dear Ami Felix OT,     Re: Ms. Eva Roca, MRN:[id number]    I certify that I have reviewed the attached plan of care and it is medically necessary for Ms. Eva Roca (1970) who is under my care.          ______________________________________                    _________________  Provider name and credentials                                           Date and time                                                                                           Plan of Care 6/10/24   Effective from: 6/10/2024  Effective to: 7/12/2024    Plan ID: [id number]            Participants as of Finalize on 6/10/2024    Name Type Comments Contact Info    ROVERTO Cantu PCP - General  [phone]    Ami Felix OT Occupational Therapist  [phone]    Edwardo Adorno MD Consulting Physician  [phone]    ROVERTO Recio Physician Assistant         Last Plan Note     Author: Ami Felix OT Status: Sign when Signing Visit Last edited: 6/10/2024   1:45 PM         Occupational Therapy  Occupational Therapy Evaluation    Patient Name: Eva Roca  MRN: [id number]  : 1970  Today's Date: 6/10/2024  Time Calculation  Start Time: 1347  Stop Time: 1430  Time Calculation (min): 43 min  Today's Charges:  OT Evaluation Time Entry  OT Evaluation (Moderate) Time Entry: 43    Current Problem  Problem List Items Addressed This Visit             ICD-10-CM    Weakness of both hands - Primary R29.898    Relevant Orders    Follow Up In Occupational Therapy     Insurance  Payor: [employer] / Plan: [employer] / Product Type: *No Product type* /     Assessment  OT Assessment  OT Assessment Results: Decreased ADL status, Decreased upper extremity strength, Decreased endurance, Decreased sensation, Decreased fine motor control, Decreased gross motor control, Decreased IADLs  Strengths: Ability to acquire knowledge, Attitude of self, Rehab experience  Clinical Presentation: Evolving with changing characteristics  Pt. Tolerated session satisfactorily.  Patient is a 55 yo female  s/p cervical neck surgery resulting in hand weakness, numbness, limited participation in ADLs and inability to perform at their prior level of function. Pt. Reports difficulty doing her hair, cutting meals, and gripping objects. Pt. Displays decreased  strength, sensation, and rapid changing ROM. Pt would benefit from occupational therapy to address the above impairments in order to return to safe and pain-free ADLs and prior level of function.    Plan  Outpatient Plan  OT Plan: ; tendon glides, putty  Frequency: 2x/wk  Duration: 4 weeks  Onset Date: 24  Certification Period Start Date: 06/10/24  Certification Period End Date: 24  Number of Treatments Authorized: 8; 30 total  Rehab Potential: Good  Plan of Care Agreement: Patient  Planned Interventions: Therapeutic Exercise (91177), Therapeutic Activity (41934), Self-Care/Home Management  "(91034), Manual Therapy (15971), Neuromuscular Re-education (52123), Electrical Stimulation (10618), Kinesiotaping, Hot Packs, and Cold Packs    General  OT Last Visit  OT Received On: 06/10/24  Reason for Referral: hand weakness s/p neck surgery  C2-C7  Referred By: Tila,  Family/Caregiver Present: No  Previous Tests/Imaging): x ray, MRI, CT  Medical History Form: Reviewed and scanned into chart   Previous Interventions/Treatments: Physical Therapy/Occupational Therapy  Primary Language: English  Preferred Learning Style: visual, kinesthetic    Red Flags:   Do you have any of the following? Yes - falls; muscle weakness  Osteoporosis    Balance Difficulties/Falls  H/o CA Fever/chills   AM Stiffness Pacemaker Unexplained Weight Changes  Dizziness/Fainting     Unexplained Change in Bowel or Bladder Functions   Unexplained Malaise or Muscle Weakness  Night Pain/Sweats       Social Determinants of Health issues: No  Money  Unemployed/ work conditions  Education/Literacy Childhood experiences   Physical home environment Social supports/coping skills Healthy behaviors Access to healthcare     Subjective  General Comment: \"I am having a lot of hand weakness and difficulty grasping.  Current condition since injury: Better    Pt.'s goals for therapy: increase strength, dexterity in hands    Precautions  Precautions  STEADI Fall Risk Score (The score of 4 or more indicates an increased risk of falling): 7  Post-Surgical Precautions: Spinal precautions    Pain  Pain Assessment  Pain Assessment: 0-10  Pain Score: 3  Pain Type: Acute pain  Pain Location: Neck  As a result of this session, pt. reported pain remained the same.  Aggravating Factors: Lifting/Carrying , Pulling/Pushing , and Repetitive Motion  Relieving Factors: Rest, Ice, and Heat    Cognition  Cognition  Overall Cognitive Status: Within Functional Limits    Prior Level of Function/Home Living   Prior Function Per Pt/Caregiver Report  Level of [place]: " "Independent with ADLs and functional transfers, Independent with homemaking with ambulation  ADL Assistance: Independent  Homemaking Assistance: Independent (boyfriend does laundry, litter box)  Ambulatory Assistance: Independent  Hand Dominance: Right  IADL History  Homemaking Responsibilities: Yes  Meal Prep Responsibility: Primary  Laundry Responsibility: Secondary  Cleaning Responsibility: Primary  Bill Paying/Finance Responsibility: Primary  Shopping Responsibility: Primary  Current License: Yes  Occupation: Other (Comment) (off d/t surgery)  Type of Occupation:   Home Living  Type of Home: Apartment  Lives With: Alone  Home Layout: One level  Home Access: Ramped entrance ('walk up a hill\")  Bathroom Shower/Tub: Tub/shower unit  Bathroom Equipment: Grab bars in shower  Concerns with home environment? No    Current ADL/IADL Function  ADL Function  ADL  Eating Assistance: Independent (cutting is hard d/t pressure - using utensils is fine.)  Grooming Assistance: Independent (6/10 difficulty just starting to complete this week)  Bathing Assistance: Independent (able to complete with extra time, washing and rinsing hair is most difficulty)  UE Dressing Assistance: Independent (difficulty using bigger shirts)  LE Dressing Assistance: Independent  Toileting Assistance with Device: Independent  IADL Function  Reports she is completing herself has difficulty with some tasks and has help from her boyfriend.     Coordination  Coordination  Movements are Fluid and Coordinated: No    Hand Function  Hand Function  Gross Grasp: Functional  Coordination: Functional    ROM/Strength  RUE   RUE : Within Functional Limits     LUE   LUE: Within Functional Limits    Treatment  This therapist instructed and demonstrated interventions to patient, patient completed the following under direct supervision of this therapist:  Therapeutic Exercise  Therapeutic Exercise Performed: Yes ROM and strengthening exercises completed " this date. Pt. engaged in tendon glides x 10 reps. Handout provided.  Educated patient on putty exercises to continue at home, this was started by home health.     Education  Education  Individual(s) Educated: Patient  Education Provided: Anatomy & Physiology, Diagnosis & Precautions, Risk and benefits of OT discussed with patient or other, POC discussed and agreed upon  Home Program: Tendon gliding, Strengthening, Handout issued  Risk and Benefits Discussed with Patient/Caregiver/Other: yes  Patient/Caregiver Demonstrated Understanding: yes  Plan of Care Discussed and Agreed Upon: yes  Patient Response to Education: Patient/Caregiver Verbalized Understanding of Information, Patient/Caregiver Performed Return Demonstration of Exercises/Activities, Patient/Caregiver Asked Appropriate Questions    HEP Provided Today: Yes - tendon glides, putty to continue at home.   Access Code: [email or web address]  URL: [email or web address]  Date: 06/10/2024  Prepared by: Ami Felix    Exercises  - Rolling Putty on Table  - 1 x daily - 7 x weekly - 3 sets - 10 reps  - 3-Point Pinch with Putty  - 1 x daily - 7 x weekly - 3 sets - 10 reps  - Tip Pinch with Putty  - 1 x daily - 7 x weekly - 3 sets - 10 reps  - Thumb Opposition with Putty  - 1 x daily - 7 x weekly - 3 sets - 10 reps  - Putty Squeezes  - 1 x daily - 7 x weekly - 3 sets - 10 reps  - Finger Abduction with Putty  - 1 x daily - 7 x weekly - 3 sets - 10 reps  - Finger Extension with Putty  - 1 x daily - 7 x weekly - 3 sets - 10 reps  - Finger Adduction with Putty  - 1 x daily - 7 x weekly - 3 sets - 10 reps  - Key Pinch with Putty  - 1 x daily - 7 x weekly - 3 sets - 10 reps  - Finger Lumbricals with Putty  - 1 x daily - 7 x weekly - 3 sets - 10 reps  - Hand AROM Tendon Gliding Series  - 2 x daily - 7 x weekly - 1 sets - 10 reps  - Tendon Gliding Fist Series: Neutral Hand Position Progression  - 2 x daily - 7 x weekly - 1 sets - 10 reps  - Seated Wrist Flexor  Full Fist Tendon Gliding  - 2 x daily - 7 x weekly - 1 sets - 10 reps  - Hand AROM Lumbrical  - 2 x daily - 7 x weekly - 1 sets - 10 reps  - Seated Digit Tendon Gliding  - 2 x daily - 7 x weekly - 1 sets - 10 reps    Outcome Measures  OT Adult Other Outcome Measures  9 Hole Peg Test: RUE: 2 min 23s; LUE: 1 min 3s  Other Outcome Measures: QuickDASH:5277 (out of 9 answered questions)  Goals  Active       OT Goals       Pt. will demonstrate MI with stating and demonstrating home exercise program in order to improve patient's ability to perform self-care, household, and/or leisure tasks.        Start:  06/10/24    Expected End:  06/28/24            Pt. will increase BUE hand strength to increase participation in self-care, household, and leisure tasks. : 5/5; 35lbs        Start:  06/10/24    Expected End:  07/12/24            Pt. will demo increased BUE fine motor ability as evidenced by performance on box and blocks test and 9 hole peg test RUE: 1min; LUE: 35s       Start:  06/10/24    Expected End:  07/12/24            Pt will report decreased numbness in bilateral hands in 75 % of all treatment sessions to improve sensation while completing daily tasks.        Start:  06/10/24    Expected End:  07/12/24            Pt. will identify and apply compensation techniques with independence to reduce injury secondary to sensation deficits in BUE.        Start:  06/10/24    Expected End:  07/12/24                JOSE Smith/TEA          Current Participants as of 6/10/2024    Name Type Comments Contact Info    ROVERTO Cantu PCP - General  [phone]    Signature pending    Ami Felix OT Occupational Therapist  [phone]    Signature pending    Edwardo Adorno MD Consulting Physician  [phone]    Signature pending    ROVERTO Recio Physician Assistant      Signature pending

## 2024-06-10 NOTE — LETTER
Marianne 10, 2024    ROVERTO Recio    Patient: Eva Roca   YOB: 1970   Date of Visit: 6/10/2024       Dear No referring provider defined for this encounter.    The attached plan of care is being sent to you because your patient’s medical reimbursement requires that you certify the plan of care. Your signature is required to allow uninterrupted insurance coverage.      You may indicate your approval by signing below and faxing this form back to us at Dept Fax: [phone].    Please call Dept: [phone] with any questions or concerns.    Thank you for this referral,        Ami Felix OT  [place] [address] [facility] [address] [phone]    Payer: Payor: [employer] / Plan: [employer]ICAID / Product Type: *No Product type* /                                                                         Date:     Dear Ami Felix OT,     Re: Ms. Eva Roca, MRN:[id number]    I certify that I have reviewed the attached plan of care and it is medically necessary for Ms. Eva Roca (1970) who is under my care.          ______________________________________                    _________________  Provider name and credentials                                           Date and time                                                                                           Plan of Care 6/10/24   Effective from: 6/10/2024  Effective to: 7/12/2024    Plan ID: [id number]            Participants as of Finalize on 6/10/2024    Name Type Comments Contact Info    ROVERTO Cantu PCP - General  [phone]    Ami Felix OT Occupational Therapist  [phone]    Edwardo Adorno MD Consulting Physician  [phone]    ROVERTO Recio Physician Assistant         Last Plan Note     Author: Ami Felix OT Status: Sign when Signing Visit Last edited: 6/10/2024  1:45 PM         Occupational  Therapy  Occupational Therapy Evaluation    Patient Name: Eva Roca  MRN: [id number]  : 1970  Today's Date: 6/10/2024  Time Calculation  Start Time: 1347  Stop Time: 1430  Time Calculation (min): 43 min  Today's Charges:  OT Evaluation Time Entry  OT Evaluation (Moderate) Time Entry: 43    Current Problem  Problem List Items Addressed This Visit             ICD-10-CM    Weakness of both hands - Primary R29.898    Relevant Orders    Follow Up In Occupational Therapy     Insurance  Payor: [employer] / Plan: [employer]ICAID / Product Type: *No Product type* /     Assessment  OT Assessment  OT Assessment Results: Decreased ADL status, Decreased upper extremity strength, Decreased endurance, Decreased sensation, Decreased fine motor control, Decreased gross motor control, Decreased IADLs  Strengths: Ability to acquire knowledge, Attitude of self, Rehab experience  Clinical Presentation: Evolving with changing characteristics  Pt. Tolerated session satisfactorily.  Patient is a 53 yo female  s/p cervical neck surgery resulting in hand weakness, numbness, limited participation in ADLs and inability to perform at their prior level of function. Pt. Reports difficulty doing her hair, cutting meals, and gripping objects. Pt. Displays decreased  strength, sensation, and rapid changing ROM. Pt would benefit from occupational therapy to address the above impairments in order to return to safe and pain-free ADLs and prior level of function.    Plan  Outpatient Plan  OT Plan: ; tendon glides, putty  Frequency: 2x/wk  Duration: 4 weeks  Onset Date: 24  Certification Period Start Date: 06/10/24  Certification Period End Date: 24  Number of Treatments Authorized: 8; 30 total  Rehab Potential: Good  Plan of Care Agreement: Patient  Planned Interventions: Therapeutic Exercise (95579), Therapeutic Activity (00729), Self-Care/Home Management (51974), Manual Therapy  "(40605), Neuromuscular Re-education (93873), Electrical Stimulation (06289), Kinesiotaping, Hot Packs, and Cold Packs    General  OT Last Visit  OT Received On: 06/10/24  Reason for Referral: hand weakness s/p neck surgery  C2-C7  Referred By: Tila,  Family/Caregiver Present: No  Previous Tests/Imaging): x ray, MRI, CT  Medical History Form: Reviewed and scanned into chart   Previous Interventions/Treatments: Physical Therapy/Occupational Therapy  Primary Language: English  Preferred Learning Style: visual, kinesthetic    Red Flags:   Do you have any of the following? Yes - falls; muscle weakness  Osteoporosis    Balance Difficulties/Falls  H/o CA Fever/chills   AM Stiffness Pacemaker Unexplained Weight Changes  Dizziness/Fainting     Unexplained Change in Bowel or Bladder Functions   Unexplained Malaise or Muscle Weakness  Night Pain/Sweats       Social Determinants of Health issues: No  Money  Unemployed/ work conditions  Education/Literacy Childhood experiences   Physical home environment Social supports/coping skills Healthy behaviors Access to healthcare     Subjective  General Comment: \"I am having a lot of hand weakness and difficulty grasping.  Current condition since injury: Better    Pt.'s goals for therapy: increase strength, dexterity in hands    Precautions  Precautions  STEADI Fall Risk Score (The score of 4 or more indicates an increased risk of falling): 7  Post-Surgical Precautions: Spinal precautions    Pain  Pain Assessment  Pain Assessment: 0-10  Pain Score: 3  Pain Type: Acute pain  Pain Location: Neck  As a result of this session, pt. reported pain remained the same.  Aggravating Factors: Lifting/Carrying , Pulling/Pushing , and Repetitive Motion  Relieving Factors: Rest, Ice, and Heat    Cognition  Cognition  Overall Cognitive Status: Within Functional Limits    Prior Level of Function/Home Living   Prior Function Per Pt/Caregiver Report  Level of [place]: Independent with ADLs and " "functional transfers, Independent with homemaking with ambulation  ADL Assistance: Independent  Homemaking Assistance: Independent (boyfriend does laundry, litter box)  Ambulatory Assistance: Independent  Hand Dominance: Right  IADL History  Homemaking Responsibilities: Yes  Meal Prep Responsibility: Primary  Laundry Responsibility: Secondary  Cleaning Responsibility: Primary  Bill Paying/Finance Responsibility: Primary  Shopping Responsibility: Primary  Current License: Yes  Occupation: Other (Comment) (off d/t surgery)  Type of Occupation:   Home Living  Type of Home: Apartment  Lives With: Alone  Home Layout: One level  Home Access: Ramped entrance ('walk up a hill\")  Bathroom Shower/Tub: Tub/shower unit  Bathroom Equipment: Grab bars in shower  Concerns with home environment? No    Current ADL/IADL Function  ADL Function  ADL  Eating Assistance: Independent (cutting is hard d/t pressure - using utensils is fine.)  Grooming Assistance: Independent (6/10 difficulty just starting to complete this week)  Bathing Assistance: Independent (able to complete with extra time, washing and rinsing hair is most difficulty)  UE Dressing Assistance: Independent (difficulty using bigger shirts)  LE Dressing Assistance: Independent  Toileting Assistance with Device: Independent  IADL Function  Reports she is completing herself has difficulty with some tasks and has help from her boyfriend.     Coordination  Coordination  Movements are Fluid and Coordinated: No    Hand Function  Hand Function  Gross Grasp: Functional  Coordination: Functional    ROM/Strength  RUE   RUE : Within Functional Limits     LUE   LUE: Within Functional Limits    Treatment  This therapist instructed and demonstrated interventions to patient, patient completed the following under direct supervision of this therapist:  Therapeutic Exercise  Therapeutic Exercise Performed: Yes ROM and strengthening exercises completed this date. Pt. engaged in " tendon glides x 10 reps. Handout provided.  Educated patient on putty exercises to continue at home, this was started by home health.     Education  Education  Individual(s) Educated: Patient  Education Provided: Anatomy & Physiology, Diagnosis & Precautions, Risk and benefits of OT discussed with patient or other, POC discussed and agreed upon  Home Program: Tendon gliding, Strengthening, Handout issued  Risk and Benefits Discussed with Patient/Caregiver/Other: yes  Patient/Caregiver Demonstrated Understanding: yes  Plan of Care Discussed and Agreed Upon: yes  Patient Response to Education: Patient/Caregiver Verbalized Understanding of Information, Patient/Caregiver Performed Return Demonstration of Exercises/Activities, Patient/Caregiver Asked Appropriate Questions    HEP Provided Today: Yes - tendon glides, putty to continue at home.   Access Code: [email or web address]  URL: [email or web address]  Date: 06/10/2024  Prepared by: Ami Felix    Exercises  - Rolling Putty on Table  - 1 x daily - 7 x weekly - 3 sets - 10 reps  - 3-Point Pinch with Putty  - 1 x daily - 7 x weekly - 3 sets - 10 reps  - Tip Pinch with Putty  - 1 x daily - 7 x weekly - 3 sets - 10 reps  - Thumb Opposition with Putty  - 1 x daily - 7 x weekly - 3 sets - 10 reps  - Putty Squeezes  - 1 x daily - 7 x weekly - 3 sets - 10 reps  - Finger Abduction with Putty  - 1 x daily - 7 x weekly - 3 sets - 10 reps  - Finger Extension with Putty  - 1 x daily - 7 x weekly - 3 sets - 10 reps  - Finger Adduction with Putty  - 1 x daily - 7 x weekly - 3 sets - 10 reps  - Key Pinch with Putty  - 1 x daily - 7 x weekly - 3 sets - 10 reps  - Finger Lumbricals with Putty  - 1 x daily - 7 x weekly - 3 sets - 10 reps  - Hand AROM Tendon Gliding Series  - 2 x daily - 7 x weekly - 1 sets - 10 reps  - Tendon Gliding Fist Series: Neutral Hand Position Progression  - 2 x daily - 7 x weekly - 1 sets - 10 reps  - Seated Wrist Flexor Full Fist Tendon Gliding  - 2  x daily - 7 x weekly - 1 sets - 10 reps  - Hand AROM Lumbrical  - 2 x daily - 7 x weekly - 1 sets - 10 reps  - Seated Digit Tendon Gliding  - 2 x daily - 7 x weekly - 1 sets - 10 reps    Outcome Measures  OT Adult Other Outcome Measures  9 Hole Peg Test: RUE: 2 min 23s; LUE: 1 min 3s  Other Outcome Measures: QuickDASH:5277 (out of 9 answered questions)  Goals  Active       OT Goals       Pt. will demonstrate MI with stating and demonstrating home exercise program in order to improve patient's ability to perform self-care, household, and/or leisure tasks.        Start:  06/10/24    Expected End:  06/28/24            Pt. will increase BUE hand strength to increase participation in self-care, household, and leisure tasks. : 5/5; 35lbs        Start:  06/10/24    Expected End:  07/12/24            Pt. will demo increased BUE fine motor ability as evidenced by performance on box and blocks test and 9 hole peg test RUE: 1min; LUE: 35s       Start:  06/10/24    Expected End:  07/12/24            Pt will report decreased numbness in bilateral hands in 75 % of all treatment sessions to improve sensation while completing daily tasks.        Start:  06/10/24    Expected End:  07/12/24            Pt. will identify and apply compensation techniques with independence to reduce injury secondary to sensation deficits in BUE.        Start:  06/10/24    Expected End:  07/12/24                JOSE Smith/L          Current Participants as of 6/10/2024    Name Type Comments Contact Info    ROVERTO Cantu PCP - General  [phone]    Signature pending    Ami Felix OT Occupational Therapist  [phone]    Signature pending    Edwardo Adorno MD Consulting Physician  [phone]    Signature pending    ROVERTO Recio Physician Assistant      Signature pending

## 2024-06-10 NOTE — LETTER
Marianne 10, 2024    ROVERTO Cantu  [address] [address]    Patient: Eva Roca   YOB: 1970   Date of Visit: 6/10/2024       Dear No referring provider defined for this encounter.    The attached plan of care is being sent to you because your patient’s medical reimbursement requires that you certify the plan of care. Your signature is required to allow uninterrupted insurance coverage.      You may indicate your approval by signing below and faxing this form back to us at Dept Fax: [phone].    Please call Dept: [phone] with any questions or concerns.    Thank you for this referral,        Ami Felix OT  [place] [address] [facility] [address] [phone]    Payer: Payor: [employer] / Plan: [employer]ICA[employer] / Product Type: *No Product type* /                                                                         Date:     Dear Ami Felix OT,     Re: Ms. Eva Roca, MRN:[id number]    I certify that I have reviewed the attached plan of care and it is medically necessary for Ms. Eva Roca (1970) who is under my care.          ______________________________________                    _________________  Provider name and credentials                                           Date and time                                                                                           Plan of Care 6/10/24   Effective from: 6/10/2024  Effective to: 7/12/2024    Plan ID: [id number]            Participants as of Finalize on 6/10/2024    Name Type Comments Contact Info    ROVERTO Cantu PCP - General  [phone]    Ami Felix OT Occupational Therapist  [phone]    Edwardo Adorno MD Consulting Physician  [phone]    ROVERTO Recio Physician Assistant         Last Plan Note     Author: Ami Felix OT Status: Sign when Signing Visit Last edited: 6/10/2024   1:45 PM         Occupational Therapy  Occupational Therapy Evaluation    Patient Name: Eva Roca  MRN: [id number]  : 1970  Today's Date: 6/10/2024  Time Calculation  Start Time: 1347  Stop Time: 1430  Time Calculation (min): 43 min  Today's Charges:  OT Evaluation Time Entry  OT Evaluation (Moderate) Time Entry: 43    Current Problem  Problem List Items Addressed This Visit             ICD-10-CM    Weakness of both hands - Primary R29.898    Relevant Orders    Follow Up In Occupational Therapy     Insurance  Payor: [employer] / Plan: [employer] / Product Type: *No Product type* /     Assessment  OT Assessment  OT Assessment Results: Decreased ADL status, Decreased upper extremity strength, Decreased endurance, Decreased sensation, Decreased fine motor control, Decreased gross motor control, Decreased IADLs  Strengths: Ability to acquire knowledge, Attitude of self, Rehab experience  Clinical Presentation: Evolving with changing characteristics  Pt. Tolerated session satisfactorily.  Patient is a 55 yo female  s/p cervical neck surgery resulting in hand weakness, numbness, limited participation in ADLs and inability to perform at their prior level of function. Pt. Reports difficulty doing her hair, cutting meals, and gripping objects. Pt. Displays decreased  strength, sensation, and rapid changing ROM. Pt would benefit from occupational therapy to address the above impairments in order to return to safe and pain-free ADLs and prior level of function.    Plan  Outpatient Plan  OT Plan: ; tendon glides, putty  Frequency: 2x/wk  Duration: 4 weeks  Onset Date: 24  Certification Period Start Date: 06/10/24  Certification Period End Date: 24  Number of Treatments Authorized: 8; 30 total  Rehab Potential: Good  Plan of Care Agreement: Patient  Planned Interventions: Therapeutic Exercise (97883), Therapeutic Activity (10608), Self-Care/Home Management  "(55054), Manual Therapy (60506), Neuromuscular Re-education (37812), Electrical Stimulation (88258), Kinesiotaping, Hot Packs, and Cold Packs    General  OT Last Visit  OT Received On: 06/10/24  Reason for Referral: hand weakness s/p neck surgery  C2-C7  Referred By: Tila,  Family/Caregiver Present: No  Previous Tests/Imaging): x ray, MRI, CT  Medical History Form: Reviewed and scanned into chart   Previous Interventions/Treatments: Physical Therapy/Occupational Therapy  Primary Language: English  Preferred Learning Style: visual, kinesthetic    Red Flags:   Do you have any of the following? Yes - falls; muscle weakness  Osteoporosis    Balance Difficulties/Falls  H/o CA Fever/chills   AM Stiffness Pacemaker Unexplained Weight Changes  Dizziness/Fainting     Unexplained Change in Bowel or Bladder Functions   Unexplained Malaise or Muscle Weakness  Night Pain/Sweats       Social Determinants of Health issues: No  Money  Unemployed/ work conditions  Education/Literacy Childhood experiences   Physical home environment Social supports/coping skills Healthy behaviors Access to healthcare     Subjective  General Comment: \"I am having a lot of hand weakness and difficulty grasping.  Current condition since injury: Better    Pt.'s goals for therapy: increase strength, dexterity in hands    Precautions  Precautions  STEADI Fall Risk Score (The score of 4 or more indicates an increased risk of falling): 7  Post-Surgical Precautions: Spinal precautions    Pain  Pain Assessment  Pain Assessment: 0-10  Pain Score: 3  Pain Type: Acute pain  Pain Location: Neck  As a result of this session, pt. reported pain remained the same.  Aggravating Factors: Lifting/Carrying , Pulling/Pushing , and Repetitive Motion  Relieving Factors: Rest, Ice, and Heat    Cognition  Cognition  Overall Cognitive Status: Within Functional Limits    Prior Level of Function/Home Living   Prior Function Per Pt/Caregiver Report  Level of [place]: " "Independent with ADLs and functional transfers, Independent with homemaking with ambulation  ADL Assistance: Independent  Homemaking Assistance: Independent (boyfriend does laundry, litter box)  Ambulatory Assistance: Independent  Hand Dominance: Right  IADL History  Homemaking Responsibilities: Yes  Meal Prep Responsibility: Primary  Laundry Responsibility: Secondary  Cleaning Responsibility: Primary  Bill Paying/Finance Responsibility: Primary  Shopping Responsibility: Primary  Current License: Yes  Occupation: Other (Comment) (off d/t surgery)  Type of Occupation:   Home Living  Type of Home: Apartment  Lives With: Alone  Home Layout: One level  Home Access: Ramped entrance ('walk up a hill\")  Bathroom Shower/Tub: Tub/shower unit  Bathroom Equipment: Grab bars in shower  Concerns with home environment? No    Current ADL/IADL Function  ADL Function  ADL  Eating Assistance: Independent (cutting is hard d/t pressure - using utensils is fine.)  Grooming Assistance: Independent (6/10 difficulty just starting to complete this week)  Bathing Assistance: Independent (able to complete with extra time, washing and rinsing hair is most difficulty)  UE Dressing Assistance: Independent (difficulty using bigger shirts)  LE Dressing Assistance: Independent  Toileting Assistance with Device: Independent  IADL Function  Reports she is completing herself has difficulty with some tasks and has help from her boyfriend.     Coordination  Coordination  Movements are Fluid and Coordinated: No    Hand Function  Hand Function  Gross Grasp: Functional  Coordination: Functional    ROM/Strength  RUE   RUE : Within Functional Limits     LUE   LUE: Within Functional Limits    Treatment  This therapist instructed and demonstrated interventions to patient, patient completed the following under direct supervision of this therapist:  Therapeutic Exercise  Therapeutic Exercise Performed: Yes ROM and strengthening exercises completed " this date. Pt. engaged in tendon glides x 10 reps. Handout provided.  Educated patient on putty exercises to continue at home, this was started by home health.     Education  Education  Individual(s) Educated: Patient  Education Provided: Anatomy & Physiology, Diagnosis & Precautions, Risk and benefits of OT discussed with patient or other, POC discussed and agreed upon  Home Program: Tendon gliding, Strengthening, Handout issued  Risk and Benefits Discussed with Patient/Caregiver/Other: yes  Patient/Caregiver Demonstrated Understanding: yes  Plan of Care Discussed and Agreed Upon: yes  Patient Response to Education: Patient/Caregiver Verbalized Understanding of Information, Patient/Caregiver Performed Return Demonstration of Exercises/Activities, Patient/Caregiver Asked Appropriate Questions    HEP Provided Today: Yes - tendon glides, putty to continue at home.   Access Code: [email or web address]  URL: [email or web address]  Date: 06/10/2024  Prepared by: Ami Felix    Exercises  - Rolling Putty on Table  - 1 x daily - 7 x weekly - 3 sets - 10 reps  - 3-Point Pinch with Putty  - 1 x daily - 7 x weekly - 3 sets - 10 reps  - Tip Pinch with Putty  - 1 x daily - 7 x weekly - 3 sets - 10 reps  - Thumb Opposition with Putty  - 1 x daily - 7 x weekly - 3 sets - 10 reps  - Putty Squeezes  - 1 x daily - 7 x weekly - 3 sets - 10 reps  - Finger Abduction with Putty  - 1 x daily - 7 x weekly - 3 sets - 10 reps  - Finger Extension with Putty  - 1 x daily - 7 x weekly - 3 sets - 10 reps  - Finger Adduction with Putty  - 1 x daily - 7 x weekly - 3 sets - 10 reps  - Key Pinch with Putty  - 1 x daily - 7 x weekly - 3 sets - 10 reps  - Finger Lumbricals with Putty  - 1 x daily - 7 x weekly - 3 sets - 10 reps  - Hand AROM Tendon Gliding Series  - 2 x daily - 7 x weekly - 1 sets - 10 reps  - Tendon Gliding Fist Series: Neutral Hand Position Progression  - 2 x daily - 7 x weekly - 1 sets - 10 reps  - Seated Wrist Flexor  Full Fist Tendon Gliding  - 2 x daily - 7 x weekly - 1 sets - 10 reps  - Hand AROM Lumbrical  - 2 x daily - 7 x weekly - 1 sets - 10 reps  - Seated Digit Tendon Gliding  - 2 x daily - 7 x weekly - 1 sets - 10 reps    Outcome Measures  OT Adult Other Outcome Measures  9 Hole Peg Test: RUE: 2 min 23s; LUE: 1 min 3s  Other Outcome Measures: QuickDASH:5277 (out of 9 answered questions)  Goals  Active       OT Goals       Pt. will demonstrate MI with stating and demonstrating home exercise program in order to improve patient's ability to perform self-care, household, and/or leisure tasks.        Start:  06/10/24    Expected End:  06/28/24            Pt. will increase BUE hand strength to increase participation in self-care, household, and leisure tasks. : 5/5; 35lbs        Start:  06/10/24    Expected End:  07/12/24            Pt. will demo increased BUE fine motor ability as evidenced by performance on box and blocks test and 9 hole peg test RUE: 1min; LUE: 35s       Start:  06/10/24    Expected End:  07/12/24            Pt will report decreased numbness in bilateral hands in 75 % of all treatment sessions to improve sensation while completing daily tasks.        Start:  06/10/24    Expected End:  07/12/24            Pt. will identify and apply compensation techniques with independence to reduce injury secondary to sensation deficits in BUE.        Start:  06/10/24    Expected End:  07/12/24                Ami Felix OTR/L          Current Participants as of 6/10/2024    Name Type Comments Contact Info    ROVERTO Cantu PCP - General  [phone]    Signature pending    Edwardo Adorno MD Consulting Physician  [phone]    Signature pending    ROVERTO Recio Physician Assistant      Signature pending    Ami Felix OT Occupational Therapist  [phone]    Electronically signed by Ami Felix OT at 6/10/2024 1505 EDT

## 2024-06-10 NOTE — PROGRESS NOTES
Occupational Therapy  Occupational Therapy Evaluation    Patient Name: Eva Roca  MRN: [id number]  : 1970  Today's Date: 6/10/2024  Time Calculation  Start Time: 1347  Stop Time: 1430  Time Calculation (min): 43 min  Today's Charges:  OT Evaluation Time Entry  OT Evaluation (Moderate) Time Entry: 43    Current Problem  Problem List Items Addressed This Visit             ICD-10-CM    Weakness of both hands - Primary R29.898    Relevant Orders    Follow Up In Occupational Therapy     Insurance  Payor: [employer] / Plan: [employer]ICAID / Product Type: *No Product type* /     Assessment  OT Assessment  OT Assessment Results: Decreased ADL status, Decreased upper extremity strength, Decreased endurance, Decreased sensation, Decreased fine motor control, Decreased gross motor control, Decreased IADLs  Strengths: Ability to acquire knowledge, Attitude of self, Rehab experience  Clinical Presentation: Evolving with changing characteristics  Pt. Tolerated session satisfactorily.  Patient is a 53 yo female  s/p cervical neck surgery resulting in hand weakness, numbness, limited participation in ADLs and inability to perform at their prior level of function. Pt. Reports difficulty doing her hair, cutting meals, and gripping objects. Pt. Displays decreased  strength, sensation, and rapid changing ROM. Pt would benefit from occupational therapy to address the above impairments in order to return to safe and pain-free ADLs and prior level of function.    Plan  Outpatient Plan  OT Plan: ; tendon glides, putty  Frequency: 2x/wk  Duration: 4 weeks  Onset Date: 24  Certification Period Start Date: 06/10/24  Certification Period End Date: 24  Number of Treatments Authorized: 8; 30 total  Rehab Potential: Good  Plan of Care Agreement: Patient  Planned Interventions: Therapeutic Exercise (73203), Therapeutic Activity (80869), Self-Care/Home Management (58839),  "Manual Therapy (76052), Neuromuscular Re-education (35699), Electrical Stimulation (94776), Kinesiotaping, Hot Packs, and Cold Packs    General  OT Last Visit  OT Received On: 06/10/24  Reason for Referral: hand weakness s/p neck surgery  C2-C7  Referred By: Tila,  Family/Caregiver Present: No  Previous Tests/Imaging): x ray, MRI, CT  Medical History Form: Reviewed and scanned into chart   Previous Interventions/Treatments: Physical Therapy/Occupational Therapy  Primary Language: English  Preferred Learning Style: visual, kinesthetic    Red Flags:   Do you have any of the following? Yes - falls; muscle weakness  Osteoporosis    Balance Difficulties/Falls  H/o CA Fever/chills   AM Stiffness Pacemaker Unexplained Weight Changes  Dizziness/Fainting     Unexplained Change in Bowel or Bladder Functions   Unexplained Malaise or Muscle Weakness  Night Pain/Sweats       Social Determinants of Health issues: No  Money  Unemployed/ work conditions  Education/Literacy Childhood experiences   Physical home environment Social supports/coping skills Healthy behaviors Access to healthcare     Subjective  General Comment: \"I am having a lot of hand weakness and difficulty grasping.  Current condition since injury: Better    Pt.'s goals for therapy: increase strength, dexterity in hands    Precautions  Precautions  STEADI Fall Risk Score (The score of 4 or more indicates an increased risk of falling): 7  Post-Surgical Precautions: Spinal precautions    Pain  Pain Assessment  Pain Assessment: 0-10  Pain Score: 3  Pain Type: Acute pain  Pain Location: Neck  As a result of this session, pt. reported pain remained the same.  Aggravating Factors: Lifting/Carrying , Pulling/Pushing , and Repetitive Motion  Relieving Factors: Rest, Ice, and Heat    Cognition  Cognition  Overall Cognitive Status: Within Functional Limits    Prior Level of Function/Home Living   Prior Function Per Pt/Caregiver Report  Level of [place]: Independent with " "ADLs and functional transfers, Independent with homemaking with ambulation  ADL Assistance: Independent  Homemaking Assistance: Independent (boyfriend does laundry, litter box)  Ambulatory Assistance: Independent  Hand Dominance: Right  IADL History  Homemaking Responsibilities: Yes  Meal Prep Responsibility: Primary  Laundry Responsibility: Secondary  Cleaning Responsibility: Primary  Bill Paying/Finance Responsibility: Primary  Shopping Responsibility: Primary  Current License: Yes  Occupation: Other (Comment) (off d/t surgery)  Type of Occupation:   Home Living  Type of Home: Apartment  Lives With: Alone  Home Layout: One level  Home Access: Ramped entrance ('walk up a hill\")  Bathroom Shower/Tub: Tub/shower unit  Bathroom Equipment: Grab bars in shower  Concerns with home environment? No    Current ADL/IADL Function  ADL Function  ADL  Eating Assistance: Independent (cutting is hard d/t pressure - using utensils is fine.)  Grooming Assistance: Independent (6/10 difficulty just starting to complete this week)  Bathing Assistance: Independent (able to complete with extra time, washing and rinsing hair is most difficulty)  UE Dressing Assistance: Independent (difficulty using bigger shirts)  LE Dressing Assistance: Independent  Toileting Assistance with Device: Independent  IADL Function  Reports she is completing herself has difficulty with some tasks and has help from her boyfriend.     Coordination  Coordination  Movements are Fluid and Coordinated: No    Hand Function  Hand Function  Gross Grasp: Functional  Coordination: Functional    ROM/Strength  RUE   RUE : Within Functional Limits     LUE   LUE: Within Functional Limits    Treatment  This therapist instructed and demonstrated interventions to patient, patient completed the following under direct supervision of this therapist:  Therapeutic Exercise  Therapeutic Exercise Performed: Yes ROM and strengthening exercises completed this date. Pt. " engaged in tendon glides x 10 reps. Handout provided.  Educated patient on putty exercises to continue at home, this was started by home health.     Education  Education  Individual(s) Educated: Patient  Education Provided: Anatomy & Physiology, Diagnosis & Precautions, Risk and benefits of OT discussed with patient or other, POC discussed and agreed upon  Home Program: Tendon gliding, Strengthening, Handout issued  Risk and Benefits Discussed with Patient/Caregiver/Other: yes  Patient/Caregiver Demonstrated Understanding: yes  Plan of Care Discussed and Agreed Upon: yes  Patient Response to Education: Patient/Caregiver Verbalized Understanding of Information, Patient/Caregiver Performed Return Demonstration of Exercises/Activities, Patient/Caregiver Asked Appropriate Questions    HEP Provided Today: Yes - tendon glides, putty to continue at home.   Access Code: [email or web address]  URL: [email or web address]  Date: 06/10/2024  Prepared by: Ami Felix    Exercises  - Rolling Putty on Table  - 1 x daily - 7 x weekly - 3 sets - 10 reps  - 3-Point Pinch with Putty  - 1 x daily - 7 x weekly - 3 sets - 10 reps  - Tip Pinch with Putty  - 1 x daily - 7 x weekly - 3 sets - 10 reps  - Thumb Opposition with Putty  - 1 x daily - 7 x weekly - 3 sets - 10 reps  - Putty Squeezes  - 1 x daily - 7 x weekly - 3 sets - 10 reps  - Finger Abduction with Putty  - 1 x daily - 7 x weekly - 3 sets - 10 reps  - Finger Extension with Putty  - 1 x daily - 7 x weekly - 3 sets - 10 reps  - Finger Adduction with Putty  - 1 x daily - 7 x weekly - 3 sets - 10 reps  - Key Pinch with Putty  - 1 x daily - 7 x weekly - 3 sets - 10 reps  - Finger Lumbricals with Putty  - 1 x daily - 7 x weekly - 3 sets - 10 reps  - Hand AROM Tendon Gliding Series  - 2 x daily - 7 x weekly - 1 sets - 10 reps  - Tendon Gliding Fist Series: Neutral Hand Position Progression  - 2 x daily - 7 x weekly - 1 sets - 10 reps  - Seated Wrist Flexor Full Fist Tendon  Gliding  - 2 x daily - 7 x weekly - 1 sets - 10 reps  - Hand AROM Lumbrical  - 2 x daily - 7 x weekly - 1 sets - 10 reps  - Seated Digit Tendon Gliding  - 2 x daily - 7 x weekly - 1 sets - 10 reps    Outcome Measures  OT Adult Other Outcome Measures  9 Hole Peg Test: RUE: 2 min 23s; LUE: 1 min 3s  Other Outcome Measures: QuickDASH:5277 (out of 9 answered questions)  Goals  Active       OT Goals       Pt. will demonstrate MI with stating and demonstrating home exercise program in order to improve patient's ability to perform self-care, household, and/or leisure tasks.        Start:  06/10/24    Expected End:  06/28/24            Pt. will increase BUE hand strength to increase participation in self-care, household, and leisure tasks. : 5/5; 35lbs        Start:  06/10/24    Expected End:  07/12/24            Pt. will demo increased BUE fine motor ability as evidenced by performance on box and blocks test and 9 hole peg test RUE: 1min; LUE: 35s       Start:  06/10/24    Expected End:  07/12/24            Pt will report decreased numbness in bilateral hands in 75 % of all treatment sessions to improve sensation while completing daily tasks.        Start:  06/10/24    Expected End:  07/12/24            Pt. will identify and apply compensation techniques with independence to reduce injury secondary to sensation deficits in BUE.        Start:  06/10/24    Expected End:  07/12/24                JOSE Smith/TEA

## 2024-06-10 NOTE — Clinical Note
Marianne 10, 2024    ROVERTO Recio    Patient: Eva Roca   YOB: 1970   Date of Visit: 6/10/2024       Dear No referring provider defined for this encounter.    The attached plan of care is being sent to you because your patient’s medical reimbursement requires that you certify the plan of care. Your signature is required to allow uninterrupted insurance coverage.      You may indicate your approval by signing below and faxing this form back to us at Dept Fax: [phone].    Please call Dept: [phone] with any questions or concerns.    Thank you for this referral,        Ami Felix OT  [place] [address] [facility] [address] [phone]    Payer: Payor: [employer] / Plan: [employer]ICAID / Product Type: *No Product type* /                                                                         Date:     Dear Ami Felix OT,     Re: Ms. Eva Roca, MRN:[id number]    I certify that I have reviewed the attached plan of care and it is medically necessary for Ms. Eva Roca (1970) who is under my care.          ______________________________________                    _________________  Provider name and credentials                                           Date and time                                                                                         Current Participants as of 6/10/2024    Name Type Comments Contact Info    ROVERTO Cantu PCP - General  [phone]    Signature pending    Edwardo Adorno MD Consulting Physician  [phone]    Signature pending    ROVERTO Recio Physician Assistant      Signature pending    Ami Felix OT Occupational Therapist  [phone]    Electronically signed by Ami Felix OT at 6/10/2024 9637 EDT

## 2024-06-10 NOTE — PROGRESS NOTES
"  Physical Therapy  Physical Therapy Orthopedic Evaluation and Treatment    Patient Name: Eva Roca  MRN: [id number]  Today's Date: 6/10/2024  Time Calculation  Start Time: 1309  Stop Time: 1348  Time Calculation (min): 39 min    Today's Charges  PT Evaluation Time Entry  PT Evaluation (Low) Time Entry: 30  PT Therapeutic Procedures Time Entry  Therapeutic Exercise Time Entry: 9      Insurance:  Visit number: 1 of 10  Authorization info: no auth required  Payor: [employer] / Plan: [employer] / Product Type: *No Product type* /     Current Problem  Problem List Items Addressed This Visit             ICD-10-CM    Other spondylosis with myelopathy, cervical region - Primary M47.12    Relevant Orders    Follow Up In Physical Therapy     1. Other spondylosis with myelopathy, cervical region  Referral to Physical Therapy    Follow Up In Physical Therapy          General:  General  Reason for Referral: neck pain  Referred By: Kings LAYNE      Precautions:   Precautions  STEADI Fall Risk Score (The score of 4 or more indicates an increased risk of falling): 7  Post-Surgical Precautions: Spinal precautions  Precautions Comment: 10# lifting restriction    Medical History Form: Reviewed (scanned into chart)    Subjective:   Subjective   Chief Complaint: Patient is a 54 year old female who presents to clinic status post \"C2-T1 PCF with Dr. Adorno\". Patient stopped wearing the cervical collar about 2 weeks ago.  Onset Date: 5/8/2024  FAIZA: Chronic    Current Condition:   Better    Pain:  Pain Assessment: 0-10  Pain Score: 3  Pain Location: Neck  Pain Orientation: Right, Left, Upper  Pain Radiating Towards: bilateral hands/fingers  Pain Descriptors: Pins and needles, Numbness, Burning  Highest: 6/10 pain  Lowest: 3/10 pain  Aggravating Factors:  cooking/prepping meals  Relieving Factors:  Rest and Lying    Relevant Information (PMH & Previous Tests/Imaging): cervical spine surgery on " 5/8/2024  Previous Interventions/Treatments: Physical Therapy    Prior Level of Function (PLOF)  Patient previously independent with all ADLs  Exercise/Physical Activity: home exercise program   Work/School: part time   Hobbies: reading, dancing    Hand dominance: right     Patients Living Environment: Reviewed and no concern    Primary Language: English    Patient's Goal(s) for Therapy: Be able to go back to work.    Red Flags: Do you have any of the following? No  Fever/chills, unexplained weight changes, dizziness/fainting, unexplained change in bowel or bladder functions, unexplained malaise or muscle weakness, night pain/sweats, numbness or tingling    Objective:  Objective     Cervical AROM  Cervical flexion: (80°): 25  Cervical extension: (50°): 24  Cervical rotation right: (80°): 39  Cervical rotation left: (80°): 34  Cervical sidebend right: (45°): 11  Cervical sidebend left: (45°): 15    Shoulder AROM  Shoulder AROM WFL: yes    Shoulder AROM  Shoulder AROM WFL: yes    Shoulder Strength  R shoulder flexion: (5/5): 3+/5  L shoulder flexion: (5/5): 4-/5  R shoulder abduction: (5/5): 3+/5  L shoulder abduction: (5/5): 4-/5  R shoulder ER: (5/5): 4/5  L shoulder ER: (5/5): 4/5  R shoulder IR: (5/5) : 4/5  L shoulder IR: (5/5): 4/5      Special Tests    Spurlings Test: not tested   Vertebral Artery Test: not tested   Radicular Symptoms: +    Treatment Performed:  Therapeutic Exercise  Therapeutic Exercise Performed: Yes  Therapeutic Exercise Activity 1: supine shoulder flex aarom x10  Therapeutic Exercise Activity 2: s/l shoulder abd x10  Therapeutic Exercise Activity 3: s/l shoulder horizontal abd x10  Therapeutic Exercise Activity 4: scap retraction x5    Outcome Measures:  Other Measures  Neck Disability Index: 62%     EDUCATION:   Individual(s) Educated: patient   Education Provided: Home exercise program, plan of care, activity modifications, pain management, and injury pathology  Handout(s)  Provided: Scanned into chart  Home Program: See below treatment  Risk and Benefits Discussed with Patient/Caregiver/Other: Yes   Patient/Caregiver Demonstrated Understanding: Yes   Plan of Care Discussed and Agreed Upon: Yes   Patient Response to Education: Patient/Caregiver verbalized understanding of information and Patient/Caregiver performed return demonstration of exercises/activities    Assessment: Patient presents with impaired cervical spine range of motion, impaired bilateral upper extremities sensation to light touch, and impaired bilateral upper extremities strength resulting in limited participation in pain-free ADLs and inability to perform at their prior level of function. Patient demonstrated dropped her keys several times due to lack of sensation and  strength. Patient's right upper extremity is weaker than her left. Skilled PT warranted to address the above stated impairments, so the patient can perform FA's without increased pain or difficulty.    PT Assessment Results: Decreased strength, Decreased range of motion, Impaired balance, Decreased mobility, Impaired sensation, Orthopedic restrictions, Pain  Rehab Prognosis: Good  Evaluation/Treatment Tolerance: Patient tolerated treatment well    Clinical Presentation: Stable and/or uncomplicated characteristics    Plan:  Treatment/Interventions: Education/ Instruction, Manual therapy, Neuromuscular re-education, Therapeutic activities, Therapeutic exercises  PT Plan: Skilled PT  PT Frequency: 2 times per week  Duration: 5 weeks  Onset Date: 06/03/24  Certification Period Start Date: 06/10/24  Certification Period End Date: 07/15/24  Number of Treatments Authorized: 1 of 10  Rehab Potential: Good  Plan of Care Agreement: Patient    Goals: Set and discussed today  Active       PT Problem       Patient will achieve bilateral cervical side bending ROM at least 30 degrees       Start:  06/10/24    Expected End:  07/15/24            Patient will achieve  bilateral cervical rotation ROM at least 60 degrees       Start:  06/10/24    Expected End:  07/15/24            Patient will achieve cervical flexion ROM at least 50 degrees       Start:  06/10/24    Expected End:  07/15/24            Patient will achieve cervical extension ROM at least 40 degrees       Start:  06/10/24    Expected End:  07/15/24            Patient will achieve bilateral shoulder flexion strength of at least 4+/5       Start:  06/10/24    Expected End:  07/15/24            Patient will achieve bilateral shoulder abduction strength of at least 4+/5       Start:  06/10/24    Expected End:  07/15/24            Patient will achieve bilateral shoulder external rotation strength of at least 4+/5 in neutral       Start:  06/10/24    Expected End:  07/15/24            Patient will achieve bilateral shoulder internal rotation strength of at least 4+/5 in neutral       Start:  06/10/24    Expected End:  07/15/24            Patient will demonstrate independence in home program for support of progression       Start:  06/10/24    Expected End:  06/24/24            Patient will report pain of no more than 2/10 demonstrating a reduction of overall pain       Start:  06/10/24    Expected End:  07/15/24            Patient will show a significant change in NDI (62% to 52%) patient reported outcome tool to demonstrate subjective imporovement       Start:  06/10/24    Expected End:  07/15/24                Plan of care was developed with input and agreement by the patient    Eduard Rausch, PT

## 2024-06-10 NOTE — Clinical Note
Marianne 10, 2024    Edwardo Adorno MD  [address]  [facility] [address]    Patient: Eva Roca   YOB: 1970   Date of Visit: 6/10/2024       Dear No referring provider defined for this encounter.    The attached plan of care is being sent to you because your patient’s medical reimbursement requires that you certify the plan of care. Your signature is required to allow uninterrupted insurance coverage.      You may indicate your approval by signing below and faxing this form back to us at Dept Fax: [phone].    Please call Dept: [phone] with any questions or concerns.    Thank you for this referral,        Ami Felix OT  [place] [id number] [facility] [address] [phone]    Payer: Payor: [employer] / Plan: [employer] / Product Type: *No Product type* /                                                                         Date:     Dear Ami Felix OT,     Re: Ms. Eva Roca, MRN:[id number]    I certify that I have reviewed the attached plan of care and it is medically necessary for Ms. Eva Roca (1970) who is under my care.          ______________________________________                    _________________  Provider name and credentials                                           Date and time                                                                                         Current Participants as of 6/10/2024    Name Type Comments Contact Info    ROVEROT Cantu PCP - General  [phone]    Signature pending    Edwardo Adorno MD Consulting Physician  [phone]    Signature pending    ROVERTO Recio Physician Assistant      Signature pending    Ami Felix OT Occupational Therapist  [phone]    Electronically signed by Ami Felix OT at 6/10/2024 1507 EDT

## 2024-06-12 ENCOUNTER — DOCUMENTATION (OUTPATIENT)
Dept: OCCUPATIONAL THERAPY | Facility: HOSPITAL | Age: 54
End: 2024-06-12
Payer: MEDICAID

## 2024-06-12 NOTE — PROGRESS NOTES
Occupational Therapy  Therapy Communication Note    Patient Name: Eva Roca  MRN: [id number]  Today's Date: 6/12/2024     Discipline: Occupational Therapy    Missed Visit Reason:      Missed Time: No Show    Comment: Phoned patient to alert of no show appt. Patient reported d[place]t recent [place] railroad crossings she was unable to access the therapy department despite trying for 45 minutes. Attempted to provide directions for upcoming appts on Monday.

## 2024-06-17 ENCOUNTER — TREATMENT (OUTPATIENT)
Dept: OCCUPATIONAL THERAPY | Facility: HOSPITAL | Age: 54
End: 2024-06-17
Payer: MEDICAID

## 2024-06-17 ENCOUNTER — TREATMENT (OUTPATIENT)
Dept: PHYSICAL THERAPY | Facility: HOSPITAL | Age: 54
End: 2024-06-17
Payer: MEDICAID

## 2024-06-17 DIAGNOSIS — M47.12 OTHER SPONDYLOSIS WITH MYELOPATHY, CERVICAL REGION: ICD-10-CM

## 2024-06-17 DIAGNOSIS — R29.898 WEAKNESS OF BOTH HANDS: Primary | ICD-10-CM

## 2024-06-17 PROCEDURE — 97110 THERAPEUTIC EXERCISES: CPT | Mod: GO | Performed by: OCCUPATIONAL THERAPIST

## 2024-06-17 PROCEDURE — 97110 THERAPEUTIC EXERCISES: CPT | Mod: GP,CQ

## 2024-06-17 ASSESSMENT — PAIN - FUNCTIONAL ASSESSMENT
PAIN_FUNCTIONAL_ASSESSMENT: 0-10
PAIN_FUNCTIONAL_ASSESSMENT: 0-10

## 2024-06-17 ASSESSMENT — PAIN SCALES - GENERAL
PAINLEVEL_OUTOF10: 0 - NO PAIN
PAINLEVEL_OUTOF10: 0 - NO PAIN

## 2024-06-17 NOTE — PROGRESS NOTES
Occupational Therapy  Occupational Therapy Treatment    Patient Name: Eva Roca  MRN: [id number]  : 1970  Today's Date: 2024  Time Calculation  Start Time: 1345  Stop Time: 1430  Time Calculation (min): 45 min  Today's Charges:  OT Therapeutic Procedures Time Entry  Therapeutic Exercise Time Entry: 45    Current Problem  Problem List Items Addressed This Visit             ICD-10-CM    Weakness of both hands - Primary R29.898     Assessment  OT Assessment  OT Assessment Results: Decreased ADL status, Decreased upper extremity strength, Decreased endurance, Decreased sensation, Decreased fine motor control, Decreased gross motor control, Decreased IADLs  Strengths: Ability to acquire knowledge, Attitude of self, Rehab experience  Clinical Presentation: Stable and/or uncomplicated characteristics  Progress towards goals:  increased coordination Improved ability to complete household activities. Improved ROM. Improved strength. Improved grasp.   Patient tolerated treatment satisfactorily.  Patient demo's good overall carryover of exercise. Reports increased ROM and strength. Says she is dropping less things at home as well. Continue with strengthening and proprioception/numbness comp techniques.     Insurance  Payor: [employer] / Plan: [employer] / Product Type: *No Product type* /     Plan  Outpatient Plan  OT Plan: ; putty  Frequency: 2wk/wk  Duration: 4 weeks  Onset Date: 24  Certification Period Start Date: 06/10/24  Certification Period End Date: 24  Number of Treatments Authorized: 8; 30 total  Rehab Potential: Good  Plan of Care Agreement: Patient  Planned Interventions: Therapeutic Exercise (02036), Therapeutic Activity (72195), Self-Care/Home Management (75649), Manual Therapy (59484), Neuromuscular Re-education (33640), Electrical Stimulation (10429), Kinesiotaping, Hot Packs, and Cold Packs     General  OT Last Visit  OT Received  "On: 06/17/24  Reason for Referral: hand weakness s/p neck surgery  C2-C7  Referred By: Tila  Family/Caregiver Present: No  Primary Language: English  Preferred Learning Style: visual, kinesthetic    Subjective  General Comment: \"my hands aren't as cardboard like, my reflexes are coming back. My hands are still numb.\"  Current condition since injury: Better   Performing HEP? Yes    Precautions  Precautions  Post-Surgical Precautions: Spinal precautions    Pain  Pain Assessment  Pain Assessment: 0-10  Pain Score: 0 - No pain  As a result of session pt. Reported pain remained the same  End of session pain: 0/10  Aggravating Factors: Lifting/Carrying , Pulling/Pushing , and Repetitive Motion  Relieving Factors: Rest, Ice, and Heat    Cognition  Cognition  Overall Cognitive Status: Within Functional Limits       Treatment  This therapist instructed and demonstrated interventions to patient, patient completed the following under direct supervision of this therapist:  Therapeutic Exercise  Therapeutic Exercise Performed: Yes Pt. engaged in tendon glides x 15 reps. Handout provided. Finger taps completed with each digit individually x15 reps, spreads x15 reps. Theraputty exercises completed this date with pink putty. Pt. engaged in 5 reps bilaterally of the following exercises, rolls, squeezes, opposition, adduciton, gross finger extension, 3 jaw joe, key pinch, and lumbrical pinch. Patient provided with hand out.    Education  Education  Individual(s) Educated: Patient  Education Provided: Anatomy & Physiology, Diagnosis & Precautions, Risk and benefits of OT discussed with patient or other, POC discussed and agreed upon  Home Program: Strengthening, Handout issued  Equipment: Thera-putty (pink)  Risk and Benefits Discussed with Patient/Caregiver/Other: yes  Patient/Caregiver Demonstrated Understanding: yes  Plan of Care Discussed and Agreed Upon: yes  Patient Response to Education: Patient/Caregiver Verbalized " Understanding of Information, Patient/Caregiver Performed Return Demonstration of Exercises/Activities, Patient/Caregiver Asked Appropriate Questions    HEP Provided Today: Yes - putty   Access Code: [email or web address]  URL: [email or web address]  Date: 06/17/2024  Prepared by: Ami Felix    Exercises  - Seated Finger MP Extension AROM with Blocking  - 1 x daily - 7 x weekly - 3 sets - 10 reps  - Finger Spreading  - 1 x daily - 7 x weekly - 3 sets - 10 reps  - Rolling Putty on Table  - 1 x daily - 7 x weekly - 3 sets - 10 reps  - 3-Point Pinch with Putty  - 1 x daily - 7 x weekly - 3 sets - 10 reps  - Tip Pinch with Putty  - 1 x daily - 7 x weekly - 3 sets - 10 reps  - Thumb Opposition with Putty  - 1 x daily - 7 x weekly - 3 sets - 10 reps  - Putty Squeezes  - 1 x daily - 7 x weekly - 3 sets - 10 reps  - Finger Abduction with Putty  - 1 x daily - 7 x weekly - 3 sets - 10 reps  - Finger Extension with Putty  - 1 x daily - 7 x weekly - 3 sets - 10 reps  - Finger Adduction with Putty  - 1 x daily - 7 x weekly - 3 sets - 10 reps  - Key Pinch with Putty  - 1 x daily - 7 x weekly - 3 sets - 10 reps  - Finger Lumbricals with Putty  - 1 x daily - 7 x weekly - 3 sets - 10 reps    Goals  Active       OT Goals       Pt. will demonstrate MI with stating and demonstrating home exercise program in order to improve patient's ability to perform self-care, household, and/or leisure tasks.        Start:  06/10/24    Expected End:  06/28/24            Pt. will increase BUE hand strength to increase participation in self-care, household, and leisure tasks. : 5/5; 35lbs        Start:  06/10/24    Expected End:  07/12/24            Pt. will demo increased BUE fine motor ability as evidenced by performance on box and blocks test and 9 hole peg test RUE: 1min; LUE: 35s       Start:  06/10/24    Expected End:  07/12/24            Pt will report decreased numbness in bilateral hands in 75 % of all treatment sessions to  improve sensation while completing daily tasks.        Start:  06/10/24    Expected End:  07/12/24            Pt. will identify and apply compensation techniques with independence to reduce injury secondary to sensation deficits in BUE.        Start:  06/10/24    Expected End:  07/12/24                Ami Felix OTR/L

## 2024-06-17 NOTE — PROGRESS NOTES
Physical Therapy Treatment    Patient Name: Eva Roca  MRN: [id number]  Today's Date: 6/17/2024  Time Calculation  Start Time: 1300  Stop Time: 1340  Time Calculation (min): 40 min        PT Therapeutic Procedures Time Entry  Therapeutic Exercise Time Entry: 40                Current Problem  1. Other spondylosis with myelopathy, cervical region  Follow Up In Physical Therapy          General  Reason for Referral: neck pain  Referred By: Kings LAYNE    Subjective   Current Condition:   Better  Patient reports     Performing HEP?: Yes    Precautions  Precautions  Post-Surgical Precautions: Spinal precautions  Precautions Comment: 10# lifting restriction  Pain  Pain Assessment: 0-10  Pain Score: 0 - No pain  Pain Location: Neck    Objective   Shoulder    Shoulder AROM     Treatments:    Therapeutic Exercise  Therapeutic Exercise Performed: Yes  Therapeutic Exercise Activity 1: supine shoulder flex aarom x10  Therapeutic Exercise Activity 2: s/l shoulder abd x10  Therapeutic Exercise Activity 4: wall slides with ball x10  Therapeutic Exercise Activity 5: scapular protraction with wand x15  Therapeutic Exercise Activity 6: Alphabet 1/2# 1x R/L  Therapeutic Exercise Activity 7: open book x15 R/L  Therapeutic Exercise Activity 8: wall angels x10  Therapeutic Exercise Activity 9: Iso ER x10 R, L yellow x20                 EDUCATION:   Individual(s) Educated: Patient  Education Provided: HEP   Risk and Benefits Discussed with Patient/Caregiver/Other: Yes   Patient/Caregiver Demonstrated Understanding: Yes   Patient Response to Education: Patient/Caregiver verbalized understanding of information    Assessment: Pt demonstrated decreased motor control with AROM exercises R>L.  Pt had difficulty controlling ball rolls up the wall, unable to perform straight up and down.  Pt was able to complete session without c/o increase pain.  PT Assessment  PT Assessment Results: Decreased strength, Decreased range of motion, Impaired  balance, Decreased mobility, Impaired sensation, Orthopedic restrictions, Pain  Rehab Prognosis: Good    Plan: Continue to progress current POC as tolerated to facilitate ability to perform functional activities.   OP PT Plan  PT Plan: Skilled PT  PT Frequency: 2 times per week  Duration: 5 weeks  Onset Date: 06/03/24  Certification Period Start Date: 06/10/24  Certification Period End Date: 07/15/24  Number of Treatments Authorized: 2 of 10    Goals:  Active       PT Problem       Patient will achieve bilateral cervical side bending ROM at least 30 degrees       Start:  06/10/24    Expected End:  07/15/24            Patient will achieve bilateral cervical rotation ROM at least 60 degrees       Start:  06/10/24    Expected End:  07/15/24            Patient will achieve cervical flexion ROM at least 50 degrees       Start:  06/10/24    Expected End:  07/15/24            Patient will achieve cervical extension ROM at least 40 degrees       Start:  06/10/24    Expected End:  07/15/24            Patient will achieve bilateral shoulder flexion strength of at least 4+/5       Start:  06/10/24    Expected End:  07/15/24            Patient will achieve bilateral shoulder abduction strength of at least 4+/5       Start:  06/10/24    Expected End:  07/15/24            Patient will achieve bilateral shoulder external rotation strength of at least 4+/5 in neutral       Start:  06/10/24    Expected End:  07/15/24            Patient will achieve bilateral shoulder internal rotation strength of at least 4+/5 in neutral       Start:  06/10/24    Expected End:  07/15/24            Patient will demonstrate independence in home program for support of progression       Start:  06/10/24    Expected End:  06/24/24            Patient will report pain of no more than 2/10 demonstrating a reduction of overall pain       Start:  06/10/24    Expected End:  07/15/24            Patient will show a significant change in NDI (62% to 52%) patient  reported outcome tool to demonstrate subjective imporovement       Start:  06/10/24    Expected End:  07/15/24                 Telly Che, PTA

## 2024-06-20 ENCOUNTER — TREATMENT (OUTPATIENT)
Dept: OCCUPATIONAL THERAPY | Facility: HOSPITAL | Age: 54
End: 2024-06-20
Payer: MEDICAID

## 2024-06-20 ENCOUNTER — TREATMENT (OUTPATIENT)
Dept: PHYSICAL THERAPY | Facility: HOSPITAL | Age: 54
End: 2024-06-20
Payer: MEDICAID

## 2024-06-20 DIAGNOSIS — R29.898 WEAKNESS OF BOTH HANDS: ICD-10-CM

## 2024-06-20 DIAGNOSIS — M47.12 OTHER SPONDYLOSIS WITH MYELOPATHY, CERVICAL REGION: ICD-10-CM

## 2024-06-20 PROCEDURE — 97110 THERAPEUTIC EXERCISES: CPT | Mod: GO | Performed by: OCCUPATIONAL THERAPIST

## 2024-06-20 PROCEDURE — 97110 THERAPEUTIC EXERCISES: CPT | Mod: GP,CQ

## 2024-06-20 ASSESSMENT — PAIN - FUNCTIONAL ASSESSMENT
PAIN_FUNCTIONAL_ASSESSMENT: 0-10
PAIN_FUNCTIONAL_ASSESSMENT: 0-10

## 2024-06-20 ASSESSMENT — PAIN SCALES - GENERAL
PAINLEVEL_OUTOF10: 0 - NO PAIN
PAINLEVEL_OUTOF10: 0 - NO PAIN

## 2024-06-20 NOTE — PROGRESS NOTES
Occupational Therapy  Occupational Therapy Treatment    Patient Name: Eva Roca  MRN: [id number]  : 1970  Today's Date: 2024  Time Calculation  Start Time: 1300  Stop Time: 1345  Time Calculation (min): 45 min  Today's Charges:  OT Therapeutic Procedures Time Entry  Therapeutic Exercise Time Entry: 45      Current Problem  Problem List Items Addressed This Visit             ICD-10-CM    Weakness of both hands R29.898     Assessment  OT Assessment  OT Assessment Results: Decreased ADL status, Decreased upper extremity strength, Decreased endurance, Decreased sensation, Decreased fine motor control, Decreased gross motor control, Decreased IADLs  Clinical Presentation: Evolving with changing characteristics  Progress towards goals: Improved ability to complete household activities. Improved ROM. Improved strength. Reduced frequency of pain. Reduced intensity of pain.  Patient tolerated treatment satisfactorily.  Good overall demo of HEP. Encouraged to continue with FM activities at home. Continues to report small improvements every day. Increased coordination noted bilaterally with putty task.     Insurance  Payor: [employer] / Plan: [employer] / Product Type: *No Product type* /     Plan  Outpatient Plan  OT Plan: 3/8; putty  Duration: 4 weeks  Onset Date: 24  Certification Period Start Date: 06/10/24  Certification Period End Date: 24  Number of Treatments Authorized: 8; 30 total  Rehab Potential: Good  Plan of Care Agreement: Patient  Planned Interventions: Therapeutic Exercise (37799), Therapeutic Activity (02399), Self-Care/Home Management (23888), Manual Therapy (29453), Neuromuscular Re-education (94929), Electrical Stimulation (45332), Kinesiotaping, Hot Packs, and Cold Packs     General  OT Last Visit  OT Received On: 24  Reason for Referral: hand weakness s/p neck surgery  C2-C7  Referred By: Tila  Family/Caregiver Present:  "No  Primary Language: English  Preferred Learning Style: visual, kinesthetic    Subjective  General Comment: \"slowly getting improvements, able to use them more like opening cans.\"  Current condition since injury: Better   Performing HEP? Yes    Precautions  Precautions  Post-Surgical Precautions: Spinal precautions    Pain  Pain Assessment  Pain Assessment: 0-10  0-10 (Numeric) Pain Score: 0 - No pain  As a result of session pt. Reported pain remained the same  End of session pain: 0/10  Aggravating Factors: Lifting/Carrying , Pulling/Pushing , and Repetitive Motion  Relieving Factors: Rest, Ice, and Heat    Treatment  This therapist instructed and demonstrated interventions to patient, patient completed the following under direct supervision of this therapist:  Therapeutic Exercise  Therapeutic Exercise Performed: Yes ROM and strengthening exercises completed this date. Theraputty exercises completed this date with pink putty. Pt. engaged in 5 reps bilaterally of the following exercises, rolls, squeezes, opposition, adduciton, gross finger extension, 3 jaw joe, key pinch, and lumbrical pinch. Putty bead removal completed this date to work on hand strength and fine motor movements. Pt tolerated well using pink putty. Pt. able to remove the beads and replace them with minimal difficulty.  Encouraged patient to continue at home. Provided patient with beads this date.     Education  Education  Individual(s) Educated: Patient  Education Provided: Anatomy & Physiology, Diagnosis & Precautions, Risk and benefits of OT discussed with patient or other, POC discussed and agreed upon  Home Program: Strengthening, Handout issued  Equipment: Thera-putty (pink)  Risk and Benefits Discussed with Patient/Caregiver/Other: yes  Patient/Caregiver Demonstrated Understanding: yes  Plan of Care Discussed and Agreed Upon: yes  Patient Response to Education: Patient/Caregiver Verbalized Understanding of Information, Patient/Caregiver " Performed Return Demonstration of Exercises/Activities, Patient/Caregiver Asked Appropriate Questions    HEP Provided Today: No    Goals  Active       OT Goals       Pt. will demonstrate MI with stating and demonstrating home exercise program in order to improve patient's ability to perform self-care, household, and/or leisure tasks.        Start:  06/10/24    Expected End:  06/28/24            Pt. will increase BUE hand strength to increase participation in self-care, household, and leisure tasks. : 5/5; 35lbs        Start:  06/10/24    Expected End:  07/12/24            Pt. will demo increased BUE fine motor ability as evidenced by performance on box and blocks test and 9 hole peg test RUE: 1min; LUE: 35s       Start:  06/10/24    Expected End:  07/12/24            Pt will report decreased numbness in bilateral hands in 75 % of all treatment sessions to improve sensation while completing daily tasks.        Start:  06/10/24    Expected End:  07/12/24            Pt. will identify and apply compensation techniques with independence to reduce injury secondary to sensation deficits in BUE.        Start:  06/10/24    Expected End:  07/12/24                JOSE Smith/TEA

## 2024-06-20 NOTE — PROGRESS NOTES
Physical Therapy Treatment    Patient Name: Eva Roca  MRN: [id number]  Today's Date: 6/20/2024  Time Calculation  Start Time: 1346  Stop Time: 1426  Time Calculation (min): 40 min        PT Therapeutic Procedures Time Entry  Manual Therapy Time Entry: 5  Therapeutic Exercise Time Entry: 35                Current Problem  1. Other spondylosis with myelopathy, cervical region  Follow Up In Physical Therapy          General  Reason for Referral: neck pain  Referred By: Kings LAYNE    Subjective   Current Condition:   Same  Patient reports no adverse response to last PT session     Performing HEP?: Yes    Precautions  Precautions  Post-Surgical Precautions: Spinal precautions  Precautions Comment: 10# lifting restriction  Pain  Pain Assessment: 0-10  0-10 (Numeric) Pain Score: 0 - No pain    Objective           Treatments:    Therapeutic Exercise  Therapeutic Exercise Performed: Yes  Therapeutic Exercise Activity 1: Nu Step Level 1 x5'  Therapeutic Exercise Activity 2: wall angels x10  Therapeutic Exercise Activity 3: shoulder flexion with wand  @ 35* angle x10  Therapeutic Exercise Activity 4: s/l shoulder abd x10 R 0#, L 1#  Therapeutic Exercise Activity 5: scapular protraction with wand x15  Therapeutic Exercise Activity 6: Alphabet 3/4# 1x R/L  Therapeutic Exercise Activity 7: scapular retraction yellow x20  Therapeutic Exercise Activity 8: B Shoulder Extension yellow x20  Therapeutic Exercise Activity 9: Iso ER x10 R, L yellow x20         Manual Therapy  Manual Therapy Performed: Yes  Manual Therapy Activity 1: B Levator scap TPR to decrease mm tenison  Manual Therapy Activity 2: Scar mobilization incision to improve soft tissue mobility                   EDUCATION:   Individual(s) Educated: Patient  Education Provided: HEP   Risk and Benefits Discussed with Patient/Caregiver/Other: Yes   Patient/Caregiver Demonstrated Understanding: Yes   Patient Response to Education: Patient/Caregiver verbalized  understanding of information    Assessment: Pt was able to tolerate progression of TE program without c/o pain in the c-spine. Pt demonstrated improved motor control with the L UE during shoulder abduction with cuing provided to decrease pace.   PT Assessment  PT Assessment Results: Decreased strength, Decreased range of motion, Impaired balance, Decreased mobility, Impaired sensation, Orthopedic restrictions, Pain  Rehab Prognosis: Good    Plan: Continue to progress current POC as tolerated to facilitate ability to perform functional activities.   OP PT Plan  PT Plan: Skilled PT  PT Frequency: 2 times per week  Duration: 5 weeks  Onset Date: 06/03/24  Certification Period Start Date: 06/10/24  Certification Period End Date: 07/15/24  Number of Treatments Authorized: 3 of 10    Goals:  Active       PT Problem       Patient will achieve bilateral cervical side bending ROM at least 30 degrees       Start:  06/10/24    Expected End:  07/15/24            Patient will achieve bilateral cervical rotation ROM at least 60 degrees       Start:  06/10/24    Expected End:  07/15/24            Patient will achieve cervical flexion ROM at least 50 degrees       Start:  06/10/24    Expected End:  07/15/24            Patient will achieve cervical extension ROM at least 40 degrees       Start:  06/10/24    Expected End:  07/15/24            Patient will achieve bilateral shoulder flexion strength of at least 4+/5       Start:  06/10/24    Expected End:  07/15/24            Patient will achieve bilateral shoulder abduction strength of at least 4+/5       Start:  06/10/24    Expected End:  07/15/24            Patient will achieve bilateral shoulder external rotation strength of at least 4+/5 in neutral       Start:  06/10/24    Expected End:  07/15/24            Patient will achieve bilateral shoulder internal rotation strength of at least 4+/5 in neutral       Start:  06/10/24    Expected End:  07/15/24            Patient will  demonstrate independence in home program for support of progression       Start:  06/10/24    Expected End:  06/24/24            Patient will report pain of no more than 2/10 demonstrating a reduction of overall pain       Start:  06/10/24    Expected End:  07/15/24            Patient will show a significant change in NDI (62% to 52%) patient reported outcome tool to demonstrate subjective imporovement       Start:  06/10/24    Expected End:  07/15/24                 Telly Che, PTA

## 2024-06-26 ENCOUNTER — TREATMENT (OUTPATIENT)
Dept: PHYSICAL THERAPY | Facility: HOSPITAL | Age: 54
End: 2024-06-26
Payer: MEDICAID

## 2024-06-26 ENCOUNTER — TREATMENT (OUTPATIENT)
Dept: OCCUPATIONAL THERAPY | Facility: HOSPITAL | Age: 54
End: 2024-06-26
Payer: MEDICAID

## 2024-06-26 DIAGNOSIS — M47.12 OTHER SPONDYLOSIS WITH MYELOPATHY, CERVICAL REGION: ICD-10-CM

## 2024-06-26 DIAGNOSIS — R29.898 WEAKNESS OF BOTH HANDS: ICD-10-CM

## 2024-06-26 PROCEDURE — 97530 THERAPEUTIC ACTIVITIES: CPT | Mod: GO | Performed by: OCCUPATIONAL THERAPIST

## 2024-06-26 PROCEDURE — 97140 MANUAL THERAPY 1/> REGIONS: CPT | Mod: GP,CQ

## 2024-06-26 PROCEDURE — 97110 THERAPEUTIC EXERCISES: CPT | Mod: GP,CQ

## 2024-06-26 PROCEDURE — 97110 THERAPEUTIC EXERCISES: CPT | Mod: GO | Performed by: OCCUPATIONAL THERAPIST

## 2024-06-26 ASSESSMENT — PAIN - FUNCTIONAL ASSESSMENT
PAIN_FUNCTIONAL_ASSESSMENT: 0-10
PAIN_FUNCTIONAL_ASSESSMENT: 0-10

## 2024-06-26 ASSESSMENT — PAIN SCALES - GENERAL
PAINLEVEL_OUTOF10: 9
PAINLEVEL_OUTOF10: 9

## 2024-06-26 NOTE — PROGRESS NOTES
Occupational Therapy  Occupational Therapy Treatment    Patient Name: Eva Roca  MRN: [id number]  : 1970  Today's Date: 2024  Time Calculation  Start Time: 1216  Stop Time: 1257  Time Calculation (min): 41 min  Today's Charges:  OT Therapeutic Procedures Time Entry  Therapeutic Activity Time Entry: 25  Therapeutic Exercise Time Entry: 16    Current Problem  Problem List Items Addressed This Visit             ICD-10-CM    Weakness of both hands R29.898     Assessment  OT Assessment  OT Assessment Results: Decreased ADL status, Decreased upper extremity strength, Decreased endurance, Decreased sensation, Decreased fine motor control, Decreased gross motor control, Decreased IADLs  Strengths: Ability to acquire knowledge, Attitude of self, Rehab experience  Clinical Presentation: Stable and/or uncomplicated characteristics  Progress towards goals: Improved ability to complete household activities. Improved strength. Reduced frequency of pain. Reduced intensity of pain. Improved grasp.   Patient tolerated treatment with considerable discomfort.  Patient with considerable discomfort in neck/shoulder. Patient reported she had been vacuuming but does not understand why she is so stiff. Provided patient with hot pack and PT to see her later. Patient able to complete exercises just very stiff today.     Insurance  Payor: [employer] / Plan: [employer] / Product Type: *No Product type* /     Plan  Outpatient Plan  OT Plan: ; FM coodination, paper  Frequency: 2x/wk  Duration: 4 weeks  Onset Date: 24  Certification Period Start Date: 06/10/24  Certification Period End Date: 24  Number of Treatments Authorized: 8; 30 total  Rehab Potential: Good  Plan of Care Agreement: Patient  Planned Interventions: Therapeutic Exercise (34088), Therapeutic Activity (38374), Self-Care/Home Management (03594), Manual Therapy (98025), Neuromuscular Re-education (56427),  "Electrical Stimulation (08600), Kinesiotaping, Hot Packs, and Cold Packs     General  OT Last Visit  OT Received On: 06/26/24  Reason for Referral: hand weakness s/p neck surgery  C2-C7  Referred By: Tila  Family/Caregiver Present: No  Primary Language: English  Preferred Learning Style: visual, kinesthetic    Subjective  General Comment: \" I have more control with the left hand.\"  Current condition since injury: Better   Performing HEP? Yes    Precautions  Precautions  Post-Surgical Precautions: Spinal precautions    Pain  Pain Assessment  Pain Assessment: 0-10  0-10 (Numeric) Pain Score: 9  Pain Type: Chronic pain  Pain Location: Neck (shoulder)  Pain Orientation: Right  As a result of session pt. Reported pain remained the same  End of session pain: 9/10  Aggravating Factors: Lifting/Carrying , Pulling/Pushing , and Repetitive Motion  Relieving Factors: Rest, Ice, and Heat      Treatment  This therapist instructed and demonstrated interventions to patient, patient completed the following under direct supervision of this therapist:  Therapeutic Exercise  Therapeutic Exercise Performed: Yes Utilized red power web this date in with wrist extension stretch, full grasp push/pull, tip pinch, palm up grasp/pull, 3 jaw pinch, rhythmic stabilization, and finger extensions. Pt. Completed x 10 reps bilaterally.     Therapeutic Activity  Therapeutic Activity Performed: Yes  Putty bead removal completed this date to work on hand strength and fine motor movements. Pt tolerated well using pink putty. Pt. able to remove the beads and replace them with minimal difficulty. Utilized pillow to engage in blu, carabineers, snaps buttons, and threading. Patient demo'd increased difficulty with carabineers the most. Able to incorporate bilateral hands and coordinated together to pull work the fasteners and thread the string.     Education  Education  Individual(s) Educated: Patient  Education Provided: Anatomy & Physiology, " Diagnosis & Precautions, Risk and benefits of OT discussed with patient or other, POC discussed and agreed upon  Equipment: Thera-putty (pink)  Risk and Benefits Discussed with Patient/Caregiver/Other: yes  Patient/Caregiver Demonstrated Understanding: yes  Plan of Care Discussed and Agreed Upon: yes  Patient Response to Education: Patient/Caregiver Verbalized Understanding of Information, Patient/Caregiver Performed Return Demonstration of Exercises/Activities, Patient/Caregiver Asked Appropriate Questions    HEP Provided Today: No      Goals  Active       OT Goals       Pt. will demonstrate MI with stating and demonstrating home exercise program in order to improve patient's ability to perform self-care, household, and/or leisure tasks.        Start:  06/10/24    Expected End:  06/28/24            Pt. will increase BUE hand strength to increase participation in self-care, household, and leisure tasks. : 5/5; 35lbs        Start:  06/10/24    Expected End:  07/12/24            Pt. will demo increased BUE fine motor ability as evidenced by performance on box and blocks test and 9 hole peg test RUE: 1min; LUE: 35s       Start:  06/10/24    Expected End:  07/12/24            Pt will report decreased numbness in bilateral hands in 75 % of all treatment sessions to improve sensation while completing daily tasks.        Start:  06/10/24    Expected End:  07/12/24            Pt. will identify and apply compensation techniques with independence to reduce injury secondary to sensation deficits in BUE.        Start:  06/10/24    Expected End:  07/12/24                JOSE Smith/TEA

## 2024-06-26 NOTE — PROGRESS NOTES
Physical Therapy Treatment    Patient Name: Eva Roca  MRN: [id number]  Today's Date: 6/26/2024  Time Calculation  Start Time: 1305  Stop Time: 1330  Time Calculation (min): 25 min        PT Therapeutic Procedures Time Entry  Manual Therapy Time Entry: 15  Therapeutic Exercise Time Entry: 10                Current Problem  1. Other spondylosis with myelopathy, cervical region  Follow Up In Physical Therapy          General  Reason for Referral: hand weakness s/p neck surgery  C2-C7  Referred By: Tila    Subjective   Current Condition:   Worse  Patient reports she woke up with increased R neck and shoulder pain today.     Performing HEP?: Yes    Precautions  Precautions  Post-Surgical Precautions: Spinal precautions  Precautions Comment: 10# lifting restriction  Pain  Pain Assessment: 0-10  0-10 (Numeric) Pain Score: 9  Pain Location: Neck  Pain Orientation: Right    Objective           Treatments:    Therapeutic Exercise  Therapeutic Exercise Performed: Yes  Therapeutic Exercise Activity 1: cervical rotation and lateral flexion x10  Therapeutic Exercise Activity 2: shoulder rolls x10         Manual Therapy  Manual Therapy Performed: Yes  Manual Therapy Activity 1: STM R cervical, UT, mid trap, and infraspinatus to decrease mm tension            EDUCATION:   Individual(s) Educated: Patient  Education Provided:   Risk and Benefits Discussed with Patient/Caregiver/Other: Yes   Patient/Caregiver Demonstrated Understanding: Yes   Patient Response to Education: Patient/Caregiver verbalized understanding of information    Assessment: Limited treatment today with focus on decreasing significant pain. Pt was able to tolerate gentle STM, with decreased symptoms after however no change with pain level. Noted increased mm tension in the mid trap and posterior shoulder which improved with STM.   PT Assessment  PT Assessment Results: Decreased strength, Decreased range of motion, Impaired balance, Decreased mobility,  Impaired sensation, Orthopedic restrictions, Pain  Rehab Prognosis: Good    Plan: Continue to progress current POC as tolerated to facilitate ability to perform functional activities.   OP PT Plan  PT Plan: Skilled PT  PT Frequency: 2 times per week  Duration: 5 weeks  Onset Date: 06/03/24  Certification Period Start Date: 06/10/24  Certification Period End Date: 07/15/24  Number of Treatments Authorized: 4 of 10    Goals:  Active       PT Problem       Patient will achieve bilateral cervical side bending ROM at least 30 degrees       Start:  06/10/24    Expected End:  07/15/24            Patient will achieve bilateral cervical rotation ROM at least 60 degrees       Start:  06/10/24    Expected End:  07/15/24            Patient will achieve cervical flexion ROM at least 50 degrees       Start:  06/10/24    Expected End:  07/15/24            Patient will achieve cervical extension ROM at least 40 degrees       Start:  06/10/24    Expected End:  07/15/24            Patient will achieve bilateral shoulder flexion strength of at least 4+/5       Start:  06/10/24    Expected End:  07/15/24            Patient will achieve bilateral shoulder abduction strength of at least 4+/5       Start:  06/10/24    Expected End:  07/15/24            Patient will achieve bilateral shoulder external rotation strength of at least 4+/5 in neutral       Start:  06/10/24    Expected End:  07/15/24            Patient will achieve bilateral shoulder internal rotation strength of at least 4+/5 in neutral       Start:  06/10/24    Expected End:  07/15/24            Patient will demonstrate independence in home program for support of progression       Start:  06/10/24    Expected End:  06/24/24            Patient will report pain of no more than 2/10 demonstrating a reduction of overall pain       Start:  06/10/24    Expected End:  07/15/24            Patient will show a significant change in NDI (62% to 52%) patient reported outcome tool to  demonstrate subjective imporovement       Start:  06/10/24    Expected End:  07/15/24                 Telly Che, PTA

## 2024-06-28 ENCOUNTER — DOCUMENTATION (OUTPATIENT)
Dept: PHYSICAL THERAPY | Facility: HOSPITAL | Age: 54
End: 2024-06-28
Payer: MEDICAID

## 2024-06-28 ENCOUNTER — DOCUMENTATION (OUTPATIENT)
Dept: OCCUPATIONAL THERAPY | Facility: HOSPITAL | Age: 54
End: 2024-06-28
Payer: MEDICAID

## 2024-06-28 NOTE — PROGRESS NOTES
NoPhysical Therapy                 Therapy Communication Note    Patient Name: Eva Roca  MRN: [id number]  Today's Date: 6/28/2024     Discipline: Physical Therapy    Missed Visit Reason:  called and spoke with pt, states she was sleeping, thought appointment was later. Pt scheduled for next week     Missed Time: No Show 8:30

## 2024-06-28 NOTE — PROGRESS NOTES
Occupational Therapy   Therapy Communication Note    Patient Name: Eva Roca  MRN: [id number]  Today's Date: 6/28/2024     Discipline: Occupational Therapy    Missed Time: No Show    Comment: Patient no showed OT appt at 745 today.

## 2024-07-01 ENCOUNTER — TREATMENT (OUTPATIENT)
Dept: PHYSICAL THERAPY | Facility: HOSPITAL | Age: 54
End: 2024-07-01
Payer: MEDICAID

## 2024-07-01 ENCOUNTER — TREATMENT (OUTPATIENT)
Dept: OCCUPATIONAL THERAPY | Facility: HOSPITAL | Age: 54
End: 2024-07-01
Payer: MEDICAID

## 2024-07-01 DIAGNOSIS — M47.12 OTHER SPONDYLOSIS WITH MYELOPATHY, CERVICAL REGION: ICD-10-CM

## 2024-07-01 DIAGNOSIS — R29.898 WEAKNESS OF BOTH HANDS: ICD-10-CM

## 2024-07-01 PROCEDURE — 97140 MANUAL THERAPY 1/> REGIONS: CPT | Mod: GP,CQ

## 2024-07-01 PROCEDURE — 97110 THERAPEUTIC EXERCISES: CPT | Mod: GP,CQ

## 2024-07-01 PROCEDURE — 97535 SELF CARE MNGMENT TRAINING: CPT | Mod: GO | Performed by: OCCUPATIONAL THERAPIST

## 2024-07-01 ASSESSMENT — ACTIVITIES OF DAILY LIVING (ADL): HOME_MANAGEMENT_TIME_ENTRY: 39

## 2024-07-01 ASSESSMENT — PAIN SCALES - GENERAL
PAINLEVEL_OUTOF10: 7
PAINLEVEL_OUTOF10: 7

## 2024-07-01 ASSESSMENT — PAIN - FUNCTIONAL ASSESSMENT
PAIN_FUNCTIONAL_ASSESSMENT: 0-10
PAIN_FUNCTIONAL_ASSESSMENT: 0-10

## 2024-07-01 NOTE — PROGRESS NOTES
Physical Therapy Treatment    Patient Name: Eva Roca  MRN: [id number]  Today's Date: 7/1/2024  Time Calculation  Start Time: 1355  Stop Time: 1433  Time Calculation (min): 38 min      Current Problem  1. Other spondylosis with myelopathy, cervical region  Follow Up In Physical Therapy          General  Reason for Referral: neck pain  Referred By: Kings  Family/Caregiver Present: No    Subjective   Current Condition:   Same  Patient reports  pt. Reports she is still experiencing burning in neck down Ut[place] into shoulder blade, but the manual therapy helped last visit.    Performing HEP?: Yes    Precautions  Precautions  Post-Surgical Precautions: Spinal precautions  Precautions Comment: 10# lifting restriction    Pain  Pain Assessment: 0-10  0-10 (Numeric) Pain Score: 7  Pain Type: Chronic pain  Pain Location: Neck  Pain Orientation: Right    Objective   Cervical Spine  C-Spine Palpation/Joint Mobility Assessment    Pain with palpation C7 , med scap B, B  U Traps.  Cervical AROM   Rot R 42 degrees, L35 degrees.  Shoulder AROM   Shoulder flex on R 160 degrees  Cervical Strength   Limited due to pain, and decreased ROM  Treatments:    Therapeutic Exercise  Therapeutic Exercise Performed: Yes  Therapeutic Exercise Activity 1: cervical rotation and lateral flexion x10  Therapeutic Exercise Activity 2: shoulder rolls x10  Therapeutic Exercise Activity 3: shoulder flexion with wand @ 35* angle x10  Therapeutic Exercise Activity 4: s/l shoulder abd x10 R 0#, L 1#  Therapeutic Exercise Activity 5: scapular protraction with wand x15  Therapeutic Exercise Activity 6: Alphabet 3/4# 1x R/L  Therapeutic Exercise Activity 7: scapular retraction yellow x20  Therapeutic Exercise Activity 8: B Shoulder Extension yellow x20    Manual Therapy  Manual Therapy Performed: Yes  Manual Therapy Activity 1: B Levator scap TPR to decrease mm tenison  Manual Therapy Activity 2: Scar mobilization incision to improve soft tissue  mobility    Assessment:Pt. Able to tolerate all exercises and manual therapy and c/o much tightness in neck and shoulders. Noted slight increase in cervical rot, and R UE flex. Pt reported decreased pain and tightness at conclusion to 6.5/10 which was down from 7/10 at start of therapy and down from 9/10 at last  two visits.  PT Assessment  PT Assessment Results: Decreased strength, Decreased range of motion, Impaired balance, Decreased mobility, Impaired sensation, Orthopedic restrictions, Pain  Rehab Prognosis: Good    Plan:continue with current PT POC with focus on ROM and pain control  OP PT Plan  PT Plan: Skilled PT  PT Frequency: 2 times per week  Duration: 5 weeks  Onset Date: 06/03/24  Certification Period Start Date: 06/10/24  Certification Period End Date: 07/15/24  Number of Treatments Authorized: 5 of 10    Goals:  Active       PT Problem       Patient will achieve bilateral cervical side bending ROM at least 30 degrees       Start:  06/10/24    Expected End:  07/15/24            Patient will achieve bilateral cervical rotation ROM at least 60 degrees       Start:  06/10/24    Expected End:  07/15/24            Patient will achieve cervical flexion ROM at least 50 degrees       Start:  06/10/24    Expected End:  07/15/24            Patient will achieve cervical extension ROM at least 40 degrees       Start:  06/10/24    Expected End:  07/15/24            Patient will achieve bilateral shoulder flexion strength of at least 4+/5       Start:  06/10/24    Expected End:  07/15/24            Patient will achieve bilateral shoulder abduction strength of at least 4+/5       Start:  06/10/24    Expected End:  07/15/24            Patient will achieve bilateral shoulder external rotation strength of at least 4+/5 in neutral       Start:  06/10/24    Expected End:  07/15/24            Patient will achieve bilateral shoulder internal rotation strength of at least 4+/5 in neutral       Start:  06/10/24    Expected  End:  07/15/24            Patient will demonstrate independence in home program for support of progression       Start:  06/10/24    Expected End:  06/24/24            Patient will report pain of no more than 2/10 demonstrating a reduction of overall pain       Start:  06/10/24    Expected End:  07/15/24            Patient will show a significant change in NDI (62% to 52%) patient reported outcome tool to demonstrate subjective imporovement       Start:  06/10/24    Expected End:  07/15/24                 Cassandra Hernandez, PTA

## 2024-07-01 NOTE — PROGRESS NOTES
Occupational Therapy  Occupational Therapy Treatment    Patient Name: Eva Roca  MRN: [id number]  : 1970  Today's Date: 2024  Time Calculation  Start Time: 1433  Stop Time: 1512  Time Calculation (min): 39 min  Today's Charges:  OT Therapeutic Procedures Time Entry  Self Care/Home Management (ADLs) Time Entry: 39    Current Problem  Problem List Items Addressed This Visit             ICD-10-CM    Weakness of both hands R29.898     Assessment  OT Assessment  OT Assessment Results: Decreased ADL status, Decreased upper extremity strength, Decreased endurance, Decreased sensation, Decreased fine motor control, Decreased gross motor control, Decreased IADLs  Strengths: Ability to acquire knowledge, Attitude of self, Rehab experience  Clinical Presentation: Stable and/or uncomplicated characteristics  Progress towards goals:  improved reflexes, coordination Improved ability to complete household activities. Improved performance in ADL tasks. Improved ROM. Improved grasp.   Patient tolerated treatment satisfactorily.  Patient continues to demo impaired coordination in BUE most notably R UE. Reports dropping food when feeding, and inability to use fork with R hand. Edu patient on compensatory techniques for feeding, following hand to mouth, and ways to increase sensation in hands. Patient verbalized understanding.     Insurance  Payor: [employer] / Plan: [employer] / Product Type: *No Product type* /     Plan  Outpatient Plan  OT Plan: ; FM coodination, paper, R shoulder ROM  Duration: 4 weeks  Onset Date: 24  Certification Period Start Date: 06/10/24  Certification Period End Date: 24  Number of Treatments Authorized: 8; 30 total  Rehab Potential: Good  Plan of Care Agreement: Patient  Planned Interventions: Therapeutic Exercise (46899), Therapeutic Activity (15400), Self-Care/Home Management (20677), Manual Therapy (35350), Neuromuscular  "Re-education (67953), Electrical Stimulation (42858), Kinesiotaping, Hot Packs, and Cold Packs     General  OT Last Visit  OT Received On: 07/01/24  Reason for Referral: hand weakness s/p neck surgery  C2-C7  Referred By: Tila  Family/Caregiver Present: No  Primary Language: English  Preferred Learning Style: visual, kinesthetic    Subjective  General Comment: \"reflex is good. I can put my hair in a poney tail.\"  Current condition since injury: Better   Performing HEP? Yes    Precautions  Precautions  Post-Surgical Precautions: Spinal precautions  Precautions Comment: 10# lifting restriction    Pain  Pain Assessment  Pain Assessment: 0-10  0-10 (Numeric) Pain Score: 7  As a result of session pt. Reported pain remained the same  End of session pain: 7/10  Aggravating Factors: Lifting/Carrying , Pulling/Pushing , and Repetitive Motion  Relieving Factors: Rest, Ice, and Heat    Treatment  This therapist instructed and demonstrated interventions to patient, patient completed the following under direct supervision of this therapist:  ADL Treatment  Feeding  Feeding Adaptive Equipment: Built up utensils (weighted utensils.)  Feeding Level of Assistance: Close supervision  Feeding Where Assessed: Chair Patient utilized putty to practice cutting small pieces of food. Patient utilized weighted built up utensils. Patient completed x3 trials into bite sized pieces. Patient able to cut with dominant R hand today with good control. Patient reported, \"slowing down\" has really helped her. Patient simulated feeding with R hand with use of putty. Patient reported she is having the most difficulty with dropping items. Discussed following RUE to mouth with eyes to ensure and identifying grasp of the food item. Simulated with putty then headed to the cafeteria to work with edible food. Patient demo'd difficulty with heavy grasp 2/2 decreased sensation in RUE, breaking some fritos. Patient displayed impaired shoulder ROM as well " increasing her neck mobility to bring food to mouth through compensation. Patient educated on shoulder motion. Attempted with following hand to mouth and without visual input. Patient struggled most without visual input. Cueing provided to keep head still and bring hand to mouth. Patient improved throughout the session but encouraged to engage at home. Patient verbalized understanding.     Education  Education  Individual(s) Educated: Patient  Education Provided: Anatomy & Physiology, Diagnosis & Precautions, Risk and benefits of OT discussed with patient or other, POC discussed and agreed upon  Risk and Benefits Discussed with Patient/Caregiver/Other: yes  Patient/Caregiver Demonstrated Understanding: yes  Plan of Care Discussed and Agreed Upon: yes  Patient Response to Education: Patient/Caregiver Verbalized Understanding of Information, Patient/Caregiver Performed Return Demonstration of Exercises/Activities, Patient/Caregiver Asked Appropriate Questions    HEP Provided Today: No      Goals  Active       OT Goals       Pt. will demonstrate MI with stating and demonstrating home exercise program in order to improve patient's ability to perform self-care, household, and/or leisure tasks.        Start:  06/10/24    Expected End:  06/28/24            Pt. will increase BUE hand strength to increase participation in self-care, household, and leisure tasks. : 5/5; 35lbs        Start:  06/10/24    Expected End:  07/12/24            Pt. will demo increased BUE fine motor ability as evidenced by performance on box and blocks test and 9 hole peg test RUE: 1min; LUE: 35s       Start:  06/10/24    Expected End:  07/12/24            Pt will report decreased numbness in bilateral hands in 75 % of all treatment sessions to improve sensation while completing daily tasks.        Start:  06/10/24    Expected End:  07/12/24            Pt. will identify and apply compensation techniques with independence to reduce injury  secondary to sensation deficits in BUE.        Start:  06/10/24    Expected End:  07/12/24                Ami Felix OTR/L

## 2024-07-08 ENCOUNTER — TREATMENT (OUTPATIENT)
Dept: PHYSICAL THERAPY | Facility: HOSPITAL | Age: 54
End: 2024-07-08
Payer: MEDICAID

## 2024-07-08 ENCOUNTER — TREATMENT (OUTPATIENT)
Dept: OCCUPATIONAL THERAPY | Facility: HOSPITAL | Age: 54
End: 2024-07-08
Payer: MEDICAID

## 2024-07-08 DIAGNOSIS — M47.12 OTHER SPONDYLOSIS WITH MYELOPATHY, CERVICAL REGION: Primary | ICD-10-CM

## 2024-07-08 DIAGNOSIS — R29.898 WEAKNESS OF BOTH HANDS: ICD-10-CM

## 2024-07-08 PROCEDURE — 97110 THERAPEUTIC EXERCISES: CPT | Mod: GP | Performed by: PHYSICAL THERAPIST

## 2024-07-08 PROCEDURE — 97530 THERAPEUTIC ACTIVITIES: CPT | Mod: GO | Performed by: OCCUPATIONAL THERAPIST

## 2024-07-08 PROCEDURE — 97140 MANUAL THERAPY 1/> REGIONS: CPT | Mod: GP | Performed by: PHYSICAL THERAPIST

## 2024-07-08 ASSESSMENT — PAIN - FUNCTIONAL ASSESSMENT
PAIN_FUNCTIONAL_ASSESSMENT: 0-10
PAIN_FUNCTIONAL_ASSESSMENT: 0-10

## 2024-07-08 ASSESSMENT — PAIN SCALES - GENERAL
PAINLEVEL_OUTOF10: 6
PAINLEVEL_OUTOF10: 6

## 2024-07-08 NOTE — PROGRESS NOTES
Occupational Therapy  Occupational Therapy Treatment    Patient Name: Eva Roca  MRN: [id number]  : 1970  Today's Date: 2024  Time Calculation  Start Time: 1436  Stop Time: 1515  Time Calculation (min): 39 min  Today's Charges:  OT Therapeutic Procedures Time Entry  Therapeutic Activity Time Entry: 39    Current Problem  Problem List Items Addressed This Visit             ICD-10-CM    Weakness of both hands R29.898     Assessment  OT Assessment  OT Assessment Results: Decreased ADL status, Decreased upper extremity strength, Decreased endurance, Decreased sensation, Decreased fine motor control, Decreased gross motor control, Decreased IADLs  Clinical Presentation: Stable and/or uncomplicated characteristics  Progress towards goals:  reflexes Improved lifting ability. Improved reaching ability. Improved strength. Improved grasp.   Patient tolerated treatment satisfactorily.  Patient continues to demonstrate increased numbness and decreased coordination in BUE, more severe in RUE. Patient encouraged to continue with bilateral hand tasks, object identification, desensitization, and HEP.     Insurance  Payor: [employer] / Plan: [employer] / Product Type: *No Product type* /     Plan  Outpatient Plan  OT Plan: ; FM coodination, paper, R shoulder ROM  Frequency: 2x/wk  Duration: 4 weeks  Onset Date: 24  Certification Period Start Date: 06/10/24  Certification Period End Date: 24  Number of Treatments Authorized: 8; 30 total  Rehab Potential: Good  Plan of Care Agreement: Patient  Planned Interventions: Therapeutic Exercise (89386), Therapeutic Activity (13897), Self-Care/Home Management (84457), Manual Therapy (14389), Neuromuscular Re-education (05016), Electrical Stimulation (10489), Kinesiotaping, Hot Packs, and Cold Packs     General  OT Last Visit  OT Received On: 24  Reason for Referral: hand weakness s/p neck surgery   "C2-C7  Referred By: Tila  Family/Caregiver Present: No  Primary Language: English  Preferred Learning Style: visual, kinesthetic    Subjective  General Comment: \"I am still numb\"  Current condition since injury: Better   Performing HEP? Yes    Precautions  Precautions  Post-Surgical Precautions: Spinal precautions  Precautions Comment: 10# lifting restriction    Pain  Pain Assessment  Pain Assessment: 0-10  0-10 (Numeric) Pain Score: 6  Pain Type: Chronic pain  Pain Location: Neck  Pain Orientation: Right  As a result of session pt. Reported pain remained the same  End of session pain: 6/10  Aggravating Factors: Lifting/Carrying , Pulling/Pushing , and Repetitive Motion  Relieving Factors: Rest, Ice, and Heat    Treatment  This therapist instructed and demonstrated interventions to patient, patient completed the following under direct supervision of this therapist:     Therapeutic Activity  Therapeutic Activity Performed: Yes Object finding in rice bin to increase object identification and desensitization. Patient able to identify most objects with L hand without visual input. Patient reported most difficulty with small objects including paperclip and marbles. Patient completed with RUE as well, but demo'd increased episodes of believing she had an object in hand which she did not. Patient educated on desensitization techniques and rice bin activities to increase sensation and object identification through touch.      Education  Education  Individual(s) Educated: Patient  Education Provided: Anatomy & Physiology, Diagnosis & Precautions, Risk and benefits of OT discussed with patient or other, POC discussed and agreed upon  Risk and Benefits Discussed with Patient/Caregiver/Other: yes  Patient/Caregiver Demonstrated Understanding: yes  Plan of Care Discussed and Agreed Upon: yes  Patient Response to Education: Patient/Caregiver Verbalized Understanding of Information, Patient/Caregiver Performed Return " Demonstration of Exercises/Activities, Patient/Caregiver Asked Appropriate Questions    HEP Provided Today: Yes - desensitization, object identification    Goals  Active       OT Goals       Pt. will demonstrate MI with stating and demonstrating home exercise program in order to improve patient's ability to perform self-care, household, and/or leisure tasks.        Start:  06/10/24    Expected End:  06/28/24            Pt. will increase BUE hand strength to increase participation in self-care, household, and leisure tasks. : 5/5; 35lbs        Start:  06/10/24    Expected End:  07/12/24            Pt. will demo increased BUE fine motor ability as evidenced by performance on box and blocks test and 9 hole peg test RUE: 1min; LUE: 35s       Start:  06/10/24    Expected End:  07/12/24            Pt will report decreased numbness in bilateral hands in 75 % of all treatment sessions to improve sensation while completing daily tasks.        Start:  06/10/24    Expected End:  07/12/24            Pt. will identify and apply compensation techniques with independence to reduce injury secondary to sensation deficits in BUE.        Start:  06/10/24    Expected End:  07/12/24                JOSE Smith/TEA

## 2024-07-08 NOTE — PROGRESS NOTES
Physical Therapy Treatment    Patient Name: Eva Roca  MRN: [id number]  Today's Date: 7/8/2024  Time Calculation  Start Time: 1350  Stop Time: 1430  Time Calculation (min): 40 min        PT Therapeutic Procedures Time Entry  Manual Therapy Time Entry: 15  Therapeutic Exercise Time Entry: 25                Current Problem  1. Other spondylosis with myelopathy, cervical region  Follow Up In Physical Therapy        Problem List Items Addressed This Visit             ICD-10-CM    Other spondylosis with myelopathy, cervical region - Primary M47.12       General  Reason for Referral: neck pain  Referred By: Kings LAYNE    Subjective   Current Condition:   Better  Patient reports that her strength is getting better, but her pain is still there. She states that she thinks that running the air conditioner is contributing to her stiffness.    Performing HEP?: Yes    Precautions  Precautions  Post-Surgical Precautions: Spinal precautions  Precautions Comment: 10# lifting restriction  Pain  Pain Assessment: 0-10  0-10 (Numeric) Pain Score: 6  Pain Location: Neck  Pain Orientation: Right    Objective     Treatments:  Therapeutic Exercise  Therapeutic Exercise Performed: Yes  Therapeutic Exercise Activity 1: cervical rotation and lateral flexion x10  Therapeutic Exercise Activity 2: shoulder rolls x10 fwd and bwd  Therapeutic Exercise Activity 3: shoulder flexion with wand @ 35* angle x15  Therapeutic Exercise Activity 4: s/l shoulder abd R 1# x10, L 1# x15  Therapeutic Exercise Activity 5: scapular protraction with wand x15  Therapeutic Exercise Activity 6: Alphabet 3/4# 1x R/L  Therapeutic Exercise Activity 7: scapular retraction red x20  Therapeutic Exercise Activity 8: B Shoulder Extension red x20    Manual Therapy  Manual Therapy Performed: Yes  Manual Therapy Activity 1: B Levator scap TPR to decrease mm tenison  Manual Therapy Activity 3: IASTM upper traps ad levator scap    EDUCATION:   Individual(s) Educated:  Patient  Education Provided: yes  Handout(s) Provided: Scanned into chart  Home Program: reviewed home exercise program   Risk and Benefits Discussed with Patient/Caregiver/Other: Yes   Patient/Caregiver Demonstrated Understanding: Yes   Patient Response to Education: Patient/Caregiver verbalized understanding of information and Patient/Caregiver performed return demonstration of exercises/activities    Assessment: Patient demonstrated good tolerance to exercises without complaints of increased pain. Patient demonstrated good tolerance to manual techniques to decrease muscle tension and neck pain.  PT Assessment  PT Assessment Results: Decreased strength, Decreased range of motion, Impaired balance, Decreased mobility, Impaired sensation, Orthopedic restrictions, Pain  Rehab Prognosis: Good  Evaluation/Treatment Tolerance: Patient tolerated treatment well    Plan: Continue with POC. Progress exercises as tolerated.  OP PT Plan  Treatment/Interventions: Education/ Instruction, Manual therapy, Neuromuscular re-education, Therapeutic activities, Therapeutic exercises  PT Plan: Skilled PT  PT Frequency: 2 times per week  Duration: 5 weeks  Onset Date: 06/03/24  Certification Period Start Date: 06/10/24  Certification Period End Date: 07/15/24  Rehab Potential: Good  Plan of Care Agreement: Patient    Goals:  Active       PT Problem       Patient will achieve bilateral cervical side bending ROM at least 30 degrees       Start:  06/10/24    Expected End:  07/15/24            Patient will achieve bilateral cervical rotation ROM at least 60 degrees       Start:  06/10/24    Expected End:  07/15/24            Patient will achieve cervical flexion ROM at least 50 degrees       Start:  06/10/24    Expected End:  07/15/24            Patient will achieve cervical extension ROM at least 40 degrees       Start:  06/10/24    Expected End:  07/15/24            Patient will achieve bilateral shoulder flexion strength of at least 4+/5        Start:  06/10/24    Expected End:  07/15/24            Patient will achieve bilateral shoulder abduction strength of at least 4+/5       Start:  06/10/24    Expected End:  07/15/24            Patient will achieve bilateral shoulder external rotation strength of at least 4+/5 in neutral       Start:  06/10/24    Expected End:  07/15/24            Patient will achieve bilateral shoulder internal rotation strength of at least 4+/5 in neutral       Start:  06/10/24    Expected End:  07/15/24            Patient will demonstrate independence in home program for support of progression       Start:  06/10/24    Expected End:  06/24/24            Patient will report pain of no more than 2/10 demonstrating a reduction of overall pain       Start:  06/10/24    Expected End:  07/15/24            Patient will show a significant change in NDI (62% to 52%) patient reported outcome tool to demonstrate subjective imporovement       Start:  06/10/24    Expected End:  07/15/24                 Eduard Rausch, PT

## 2024-07-11 ENCOUNTER — APPOINTMENT (OUTPATIENT)
Dept: OCCUPATIONAL THERAPY | Facility: HOSPITAL | Age: 54
End: 2024-07-11
Payer: MEDICAID

## 2024-07-11 ENCOUNTER — APPOINTMENT (OUTPATIENT)
Dept: PHYSICAL THERAPY | Facility: HOSPITAL | Age: 54
End: 2024-07-11
Payer: MEDICAID

## 2024-07-17 ENCOUNTER — TREATMENT (OUTPATIENT)
Dept: OCCUPATIONAL THERAPY | Facility: HOSPITAL | Age: 54
End: 2024-07-17
Payer: MEDICAID

## 2024-07-17 ENCOUNTER — TREATMENT (OUTPATIENT)
Dept: PHYSICAL THERAPY | Facility: HOSPITAL | Age: 54
End: 2024-07-17
Payer: MEDICAID

## 2024-07-17 DIAGNOSIS — M47.12 OTHER SPONDYLOSIS WITH MYELOPATHY, CERVICAL REGION: Primary | ICD-10-CM

## 2024-07-17 DIAGNOSIS — R29.898 WEAKNESS OF BOTH HANDS: Primary | ICD-10-CM

## 2024-07-17 PROCEDURE — 97110 THERAPEUTIC EXERCISES: CPT | Mod: GP | Performed by: PHYSICAL THERAPIST

## 2024-07-17 PROCEDURE — 97530 THERAPEUTIC ACTIVITIES: CPT | Mod: GO | Performed by: OCCUPATIONAL THERAPIST

## 2024-07-17 ASSESSMENT — PAIN - FUNCTIONAL ASSESSMENT
PAIN_FUNCTIONAL_ASSESSMENT: 0-10
PAIN_FUNCTIONAL_ASSESSMENT: 0-10

## 2024-07-17 ASSESSMENT — PAIN SCALES - GENERAL
PAINLEVEL_OUTOF10: 5 - MODERATE PAIN
PAINLEVEL_OUTOF10: 5 - MODERATE PAIN

## 2024-07-17 NOTE — LETTER
July 17, 2024    Edwardo Adorno MD  [address] [facility]  [facility] [address]    Patient: Eva Roca   YOB: 1970   Date of Visit: 7/17/2024       Dear ROVERTO Cantu  [address] [place] [address]    The attached plan of care is being sent to you because your patient’s medical reimbursement requires that you certify the plan of care. Your signature is required to allow uninterrupted insurance coverage.      You may indicate your approval by signing below and faxing this form back to us at Dept Fax: [phone].    Please call Dept: [phone] with any questions or concerns.    Thank you for this referral,        Ami Felix OT  [place] [address] [facility] [address] [phone]    Payer: Payor: [employer] / Plan: [employer] / Product Type: *No Product type* /                                                                         Date:     Dear Ami Felix OT,     Re: Ms. Eva Roca, MRN:[id number]    I certify that I have reviewed the attached plan of care and it is medically necessary for Ms. Eva Roca (1970) who is under my care.          ______________________________________                    _________________  Provider name and credentials                                           Date and time                                                                                           Plan of Care 7/17/24   Effective from: 7/17/2024  Effective to: 8/30/2024    Plan ID: [id number]            Participants as of Finalize on 7/17/2024    Name Type Comments Contact Info    ROVERTO Cantu PCP - General  [phone]    Ami Felix OT Occupational Therapist  [phone]    Edwardo Adorno MD Consulting Physician  [phone]    ROVERTO Recio Physician Assistant         Last Plan Note     Author: Ami Felix OT  Status: Sign when Signing Visit Last edited: 2024 10:00 AM         Occupational Therapy  Occupational Therapy Treatment, Re-Check/Progress    Patient Name: Eva Roca  MRN: [id number]  : 1970  Today's Date: 2024  Time Calculation  Start Time: 1000  Stop Time: 1045  Time Calculation (min): 45 min  Today's Charges:  OT Therapeutic Procedures Time Entry  Therapeutic Activity Time Entry: 45    Current Problem  Problem List Items Addressed This Visit             ICD-10-CM    Weakness of both hands - Primary R29.898     Assessment  OT Assessment  OT Assessment Results: Decreased ADL status, Decreased upper extremity strength, Decreased endurance, Decreased sensation, Decreased fine motor control, Decreased gross motor control, Decreased IADLs  Strengths: Ability to acquire knowledge, Attitude of self, Rehab experience  Clinical Presentation: Stable and/or uncomplicated characteristics  Progress towards goals:  improved FM ability Improved ability to complete household activities. Improved performance in ADL tasks. Improved performance in IADL tasks.  Improved performance in leisure activities.  Improved ROM. Improved strength. Reduced pain level. Improved grasp.   Patient tolerated treatment satisfactorily.  Good overall progress towards goals at this time.  Still reports increased numbness that has not changed. Will continue to work on desensitization. Patient displays increased grasp strength, FM coordination and use of compensatory strategies, though she is still having difficulty with her hands.     Insurance  Payor: [employer] / Plan: [employer] / Product Type: *No Product type* /     Plan  Outpatient Plan  OT Plan: ; FM coodination, paper, R shoulder ROM  Frequency: 2x/wk  Duration: 4 weeks  Onset Date: 24  Certification Period Start Date: 06/10/24  Certification Period End Date: 24  Number of Treatments Authorized: 8; 30 total  Rehab  "Potential: Good  Plan of Care Agreement: Patient  Plan of Care Agreement: Patient  Planned Interventions: Therapeutic Exercise (98724), Therapeutic Activity (28686), Self-Care/Home Management (29189), Manual Therapy (67682), Neuromuscular Re-education (22242), Electrical Stimulation (58000), Kinesiotaping, Hot Packs, and Cold Packs     General  OT Last Visit  OT Received On: 07/17/24  Reason for Referral: hand weakness s/p neck surgery  C2-C7  Referred By: Tila  Family/Caregiver Present: No  Primary Language: English  Preferred Learning Style: visual, kinesthetic    Subjective  General Comment: \"numbness is the same.\"  Current condition since injury: Better   Performing HEP? Yes    Precautions  Precautions  Post-Surgical Precautions: Spinal precautions  Precautions Comment: 10# lifting restriction    Pain  Pain Assessment  Pain Assessment: 0-10  0-10 (Numeric) Pain Score: 5 - Moderate pain  Pain Type: Chronic pain  Pain Location: Neck  Pain Orientation: Right  As a result of session pt. Reported pain remained the same  End of session pain: 5/10  Aggravating Factors: Lifting/Carrying , Pulling/Pushing , and Repetitive Motion  Relieving Factors: Rest, Ice, and Heat    ROM/Strength  Right Hand Strength -  (lbs)  Handle Setting 2 (lbs): 32.67 lbs (33, 35,30)    Left Hand Strength -  (lbs)  Handle Setting 2 (lbs): 46.67 lbs (55, 45, 40)    Treatment  This therapist instructed and demonstrated interventions to patient, patient completed the following under direct supervision of this therapist:     Therapeutic Activity  Therapeutic Activity Performed: Yes Re-eval completed this date. QuickDASH completed. Goals discussed. Strength/ROM and fine motor measurements taken. Patient displays increased FM ability meeting the goal with RUE; patient met  goal on LUE very close on RUE, displays good overall use of comp strategies. Patient reports she is still having numbness and difficulty with cooking, and yard work.D/t " continued deficits patient would benefit from continued OT 1x/wk for 6wks. Patient requested 1x/wk d/t transportation difficulties. Paper crumpling completed x5 reps bilaterally. Patient engaged in paper tearing as well to encourage grasp and bilateral hand tasks. Patient was able to complete without difficulty today.     Education  Education  Individual(s) Educated: Patient  Education Provided: Anatomy & Physiology, Diagnosis & Precautions, Risk and benefits of OT discussed with patient or other, POC discussed and agreed upon  Risk and Benefits Discussed with Patient/Caregiver/Other: yes  Patient/Caregiver Demonstrated Understanding: yes  Plan of Care Discussed and Agreed Upon: yes  Patient Response to Education: Patient/Caregiver Verbalized Understanding of Information, Patient/Caregiver Performed Return Demonstration of Exercises/Activities, Patient/Caregiver Asked Appropriate Questions    HEP Provided Today: No  Edu on desensitization techniques.     Outcome Measures  OT Adult Other Outcome Measures  9 Hole Peg Test: RUE: 51.1s; LUE: 50.9s  Other Outcome Measures: QuickDASH: 41 raw = 68.18      Goals  Active       OT Goals       Pt. will demonstrate MI with stating and demonstrating home exercise program in order to improve patient's ability to perform self-care, household, and/or leisure tasks.  (Met)       Start:  06/10/24    Expected End:  06/28/24    Resolved:  07/17/24      Goal Note       7/17: completing              Pt. will increase BUE hand strength to increase participation in self-care, household, and leisure tasks. : 5/5; 35lbs  (Met)       Start:  06/10/24    Expected End:  07/12/24    Resolved:  07/17/24    Updated to: Pt. will increase RUE hand strength to increase participation in self-care, household, and leisure tasks. : 5/5; 35lbs    Update reason: partially met      Goal Note       7/17: 4+/5; RUE: 32.67; LUE: 46.67              Pt. will demo increased BUE fine motor ability as  evidenced by performance on box and blocks test and 9 hole peg test RUE: 1min; LUE: 35s (Met)       Start:  06/10/24    Expected End:  07/12/24    Resolved:  07/17/24    Updated to: Pt. will demo increased BUE fine motor ability as evidenced by performance on box and blocks test and 9 hole peg test RUE: 40s; LUE: 35s    Update reason: partially met      Goal Note       7/17: RUE 51.1s; LUE: 50.9s              Pt will report decreased numbness in bilateral hands in 75 % of all treatment sessions to improve sensation while completing daily tasks.  (Not Progressing)       Start:  06/10/24    Expected End:  07/12/24         Goal Note       7/17: reports numbness has been the same from hips up               Pt. will identify and apply compensation techniques with independence to reduce injury secondary to sensation deficits in BUE.  (Progressing)       Start:  06/10/24    Expected End:  07/12/24         Goal Note       7/17: reports she is not filling laundry baskets all the way, using vision more to adapt, holding tighter;               Pt. will increase RUE hand strength to increase participation in self-care, household, and leisure tasks. : 5/5; 35lbs       Start:  07/17/24    Expected End:  07/12/24                Pt. will demo increased BUE fine motor ability as evidenced by performance on box and blocks test and 9 hole peg test RUE: 40s; LUE: 35s       Start:  07/17/24    Expected End:  07/12/24                    Ami Felix OTR/L           Current Participants as of 7/17/2024    Name Type Comments Contact Info    ROVERTO Cantu PCP - General  [phone]    Signature pending    Edwardo Adorno MD Consulting Physician  [phone]    Signature pending    ROVERTO Recio Physician Assistant      Signature pending    Ami Felix OT Occupational Therapist  [phone]    Electronically signed by Ami Felix OT at 7/17/2024 1048 EDT

## 2024-07-17 NOTE — PROGRESS NOTES
Physical Therapy Re-Evaluation and Treatment    Patient Name: Eva Roca  MRN: [id number]  Today's Date: 7/17/2024  Time Calculation  Start Time: 1049  Stop Time: 1129  Time Calculation (min): 40 min        PT Therapeutic Procedures Time Entry  Therapeutic Exercise Time Entry: 40                Current Problem  1. Other spondylosis with myelopathy, cervical region  Follow Up In Physical Therapy        Problem List Items Addressed This Visit             ICD-10-CM    Other spondylosis with myelopathy, cervical region - Primary M47.12       General  Reason for Referral: neck pain  Referred By: Kings LAYNE  Preferred Learning Style: visual, kinesthetic    Subjective   Current Condition:   Better  Patient reports that her walking is getting better. She states that she still has numbness in her hands and trunk. Patient states that she feels like her skin is burning in her arms at the end of the day.    Performing HEP?: Yes    Precautions  Precautions  Post-Surgical Precautions: Spinal precautions  Precautions Comment: 10# lifting restriction  Pain  Pain Assessment: 0-10  0-10 (Numeric) Pain Score: 5 - Moderate pain  Pain Location: Neck  Pain Orientation: Right    Objective   Cervical Spine    Cervical AROM  Cervical flexion: (80°): 33  Cervical extension: (50°): 15  Cervical rotation right: (80°): 34  Cervical rotation left: (80°): 43  Cervical sidebend right: (45°): 18  Cervical sidebend left: (45°): 8    Shoulder AROM  Shoulder AROM WFL: yes    Shoulder Strength  R shoulder flexion: (5/5): 4-/5  L shoulder flexion: (5/5): 4/5  R shoulder abduction: (5/5): 4-/5  L shoulder abduction: (5/5): 4/5  R shoulder ER: (5/5): 4/5  L shoulder ER: (5/5): 5/5  R shoulder IR: (5/5) : 4+/5  L shoulder IR: (5/5): 5/5    DTR   R Biceps C5: (2+): 3+  L Biceps C5: (2+): 3+  R Brachioradialis C6: (2+): 3+  L Brachioradialis C6: (2+): 3+    Outcome Measures:  Other Measures  Neck Disability Index: 46%    Treatments:  Therapeutic  Exercise  Therapeutic Exercise Performed: Yes  Therapeutic Exercise Activity 1: cervical rotation and lateral flexion x10  Therapeutic Exercise Activity 2: shoulder rolls x10 fwd and bwd  Therapeutic Exercise Activity 3: shoulder flexion with wand @ 35* angle x15  Therapeutic Exercise Activity 5: scapular protraction with wand x15  Therapeutic Exercise Activity 6: Alphabet 3/4# 1x R/L  Therapeutic Exercise Activity 7: scapular retraction grn x20  Therapeutic Exercise Activity 8: B Shoulder Extension grn x20         EDUCATION:   Individual(s) Educated: Patient  Education Provided: yes  Handout(s) Provided: Scanned into chart  Home Program: reviewed home exercise program   Risk and Benefits Discussed with Patient/Caregiver/Other: Yes   Patient/Caregiver Demonstrated Understanding: Yes   Patient Response to Education: Patient/Caregiver verbalized understanding of information and Patient/Caregiver performed return demonstration of exercises/activities    Assessment: Patient is progressing slowly towards her physical therapy goals. Patient's shoulder strength is improving nicely, but her range of motion is improving more slowly. Patient's biceps and brachioradialis DTR are brisk (3+) in bilateral upper extremities. Skilled physical therapy is warranted to address the above stated impairments, so the patient can perform functional activities and work duties without increased pain or difficulty.   PT Assessment  PT Assessment Results: Decreased strength, Decreased range of motion, Impaired balance, Decreased mobility, Impaired sensation, Orthopedic restrictions, Pain  Rehab Prognosis: Good  Evaluation/Treatment Tolerance: Patient tolerated treatment well    Plan: Continue with POC. Progress exercises as tolerated.  OP PT Plan  Treatment/Interventions: Education/ Instruction, Manual therapy, Neuromuscular re-education, Therapeutic activities, Therapeutic exercises  PT Plan: Skilled PT  PT Frequency: 1 time per  week  Duration: 5 weeks  Onset Date: 06/03/24  Certification Period Start Date: 06/10/24  Certification Period End Date: 07/15/24  Number of Treatments Authorized: 7 of 12  Rehab Potential: Good  Plan of Care Agreement: Patient    Goals:  Active       PT Problem       Patient will achieve bilateral cervical side bending ROM at least 30 degrees (Progressing)       Start:  06/10/24    Expected End:  08/21/24            Patient will achieve bilateral cervical rotation ROM at least 60 degrees (Progressing)       Start:  06/10/24    Expected End:  08/21/24            Patient will achieve cervical flexion ROM at least 50 degrees (Progressing)       Start:  06/10/24    Expected End:  08/21/24            Patient will achieve cervical extension ROM at least 40 degrees (Not Progressing)       Start:  06/10/24    Expected End:  08/21/24            Patient will achieve bilateral shoulder flexion strength of at least 4+/5 (Progressing)       Start:  06/10/24    Expected End:  08/21/24            Patient will achieve bilateral shoulder abduction strength of at least 4+/5 (Progressing)       Start:  06/10/24    Expected End:  08/21/24            Patient will achieve bilateral shoulder external rotation strength of at least 4+/5 in neutral (Progressing)       Start:  06/10/24    Expected End:  08/21/24            Patient will achieve bilateral shoulder internal rotation strength of at least 4+/5 in neutral (Met)       Start:  06/10/24    Expected End:  07/15/24    Resolved:  07/17/24         Patient will demonstrate independence in home program for support of progression (Met)       Start:  06/10/24    Expected End:  06/24/24    Resolved:  07/17/24         Patient will report pain of no more than 2/10 demonstrating a reduction of overall pain (Progressing)       Start:  06/10/24    Expected End:  08/21/24            Patient will show a significant change in NDI (62% to 52%) patient reported outcome tool to demonstrate subjective  imporovement (Met)       Start:  06/10/24    Expected End:  07/15/24    Resolved:  07/17/24         PATIENT WILL SHOW A SIGNIFICANT CHANGE IN NDI (46% to 36%) PATIENT REPORTED OUTCOME TOOL TO DEMONSTRATE SUBJECTIVE IMPROVEMENT       Start:  07/17/24    Expected End:  08/21/24                     Eduard Rausch, PT

## 2024-07-17 NOTE — PROGRESS NOTES
Occupational Therapy  Occupational Therapy Treatment, Re-Check/Progress    Patient Name: Eva Roca  MRN: [id number]  : 1970  Today's Date: 2024  Time Calculation  Start Time: 1000  Stop Time: 1045  Time Calculation (min): 45 min  Today's Charges:  OT Therapeutic Procedures Time Entry  Therapeutic Activity Time Entry: 45    Current Problem  Problem List Items Addressed This Visit             ICD-10-CM    Weakness of both hands - Primary R29.898     Assessment  OT Assessment  OT Assessment Results: Decreased ADL status, Decreased upper extremity strength, Decreased endurance, Decreased sensation, Decreased fine motor control, Decreased gross motor control, Decreased IADLs  Strengths: Ability to acquire knowledge, Attitude of self, Rehab experience  Clinical Presentation: Stable and/or uncomplicated characteristics  Progress towards goals:  improved FM ability Improved ability to complete household activities. Improved performance in ADL tasks. Improved performance in IADL tasks.  Improved performance in leisure activities.  Improved ROM. Improved strength. Reduced pain level. Improved grasp.   Patient tolerated treatment satisfactorily.  Good overall progress towards goals at this time.  Still reports increased numbness that has not changed. Will continue to work on desensitization. Patient displays increased grasp strength, FM coordination and use of compensatory strategies, though she is still having difficulty with her hands.     Insurance  Payor: [employer] / Plan: [employer] / Product Type: *No Product type* /     Plan  Outpatient Plan  OT Plan: ; FM coodination, paper, R shoulder ROM  Frequency: 2x/wk  Duration: 4 weeks  Onset Date: 24  Certification Period Start Date: 06/10/24  Certification Period End Date: 24  Number of Treatments Authorized: 8; 30 total  Rehab Potential: Good  Plan of Care Agreement: Patient  Plan of Care  "Agreement: Patient  Planned Interventions: Therapeutic Exercise (82148), Therapeutic Activity (36925), Self-Care/Home Management (03620), Manual Therapy (14853), Neuromuscular Re-education (61662), Electrical Stimulation (17861), Kinesiotaping, Hot Packs, and Cold Packs     General  OT Last Visit  OT Received On: 07/17/24  Reason for Referral: hand weakness s/p neck surgery  C2-C7  Referred By: Tila  Family/Caregiver Present: No  Primary Language: English  Preferred Learning Style: visual, kinesthetic    Subjective  General Comment: \"numbness is the same.\"  Current condition since injury: Better   Performing HEP? Yes    Precautions  Precautions  Post-Surgical Precautions: Spinal precautions  Precautions Comment: 10# lifting restriction    Pain  Pain Assessment  Pain Assessment: 0-10  0-10 (Numeric) Pain Score: 5 - Moderate pain  Pain Type: Chronic pain  Pain Location: Neck  Pain Orientation: Right  As a result of session pt. Reported pain remained the same  End of session pain: 5/10  Aggravating Factors: Lifting/Carrying , Pulling/Pushing , and Repetitive Motion  Relieving Factors: Rest, Ice, and Heat    ROM/Strength  Right Hand Strength -  (lbs)  Handle Setting 2 (lbs): 32.67 lbs (33, 35,30)    Left Hand Strength -  (lbs)  Handle Setting 2 (lbs): 46.67 lbs (55, 45, 40)    Treatment  This therapist instructed and demonstrated interventions to patient, patient completed the following under direct supervision of this therapist:     Therapeutic Activity  Therapeutic Activity Performed: Yes Re-eval completed this date. QuickDASH completed. Goals discussed. Strength/ROM and fine motor measurements taken. Patient displays increased FM ability meeting the goal with RUE; patient met  goal on LUE very close on RUE, displays good overall use of comp strategies. Patient reports she is still having numbness and difficulty with cooking, and yard work.D/t continued deficits patient would benefit from continued OT " 1x/wk for 6wks. Patient requested 1x/wk d/t transportation difficulties. Paper crumpling completed x5 reps bilaterally. Patient engaged in paper tearing as well to encourage grasp and bilateral hand tasks. Patient was able to complete without difficulty today.     Education  Education  Individual(s) Educated: Patient  Education Provided: Anatomy & Physiology, Diagnosis & Precautions, Risk and benefits of OT discussed with patient or other, POC discussed and agreed upon  Risk and Benefits Discussed with Patient/Caregiver/Other: yes  Patient/Caregiver Demonstrated Understanding: yes  Plan of Care Discussed and Agreed Upon: yes  Patient Response to Education: Patient/Caregiver Verbalized Understanding of Information, Patient/Caregiver Performed Return Demonstration of Exercises/Activities, Patient/Caregiver Asked Appropriate Questions    HEP Provided Today: No  Edu on desensitization techniques.     Outcome Measures  OT Adult Other Outcome Measures  9 Hole Peg Test: RUE: 51.1s; LUE: 50.9s  Other Outcome Measures: QuickDASH: 41 raw = 68.18      Goals  Active       OT Goals       Pt. will demonstrate MI with stating and demonstrating home exercise program in order to improve patient's ability to perform self-care, household, and/or leisure tasks.  (Met)       Start:  06/10/24    Expected End:  06/28/24    Resolved:  07/17/24      Goal Note       7/17: completing              Pt. will increase BUE hand strength to increase participation in self-care, household, and leisure tasks. : 5/5; 35lbs  (Met)       Start:  06/10/24    Expected End:  07/12/24    Resolved:  07/17/24    Updated to: Pt. will increase RUE hand strength to increase participation in self-care, household, and leisure tasks. : 5/5; 35lbs    Update reason: partially met      Goal Note       7/17: 4+/5; RUE: 32.67; LUE: 46.67              Pt. will demo increased BUE fine motor ability as evidenced by performance on box and blocks test and 9 hole peg  test RUE: 1min; LUE: 35s (Met)       Start:  06/10/24    Expected End:  07/12/24    Resolved:  07/17/24    Updated to: Pt. will demo increased BUE fine motor ability as evidenced by performance on box and blocks test and 9 hole peg test RUE: 40s; LUE: 35s    Update reason: partially met      Goal Note       7/17: RUE 51.1s; LUE: 50.9s              Pt will report decreased numbness in bilateral hands in 75 % of all treatment sessions to improve sensation while completing daily tasks.  (Not Progressing)       Start:  06/10/24    Expected End:  07/12/24         Goal Note       7/17: reports numbness has been the same from hips up               Pt. will identify and apply compensation techniques with independence to reduce injury secondary to sensation deficits in BUE.  (Progressing)       Start:  06/10/24    Expected End:  07/12/24         Goal Note       7/17: reports she is not filling laundry baskets all the way, using vision more to adapt, holding tighter;               Pt. will increase RUE hand strength to increase participation in self-care, household, and leisure tasks. : 5/5; 35lbs       Start:  07/17/24    Expected End:  07/12/24                Pt. will demo increased BUE fine motor ability as evidenced by performance on box and blocks test and 9 hole peg test RUE: 40s; LUE: 35s       Start:  07/17/24    Expected End:  07/12/24                    JOSE Smith/TEA

## 2024-07-17 NOTE — LETTER
July 17, 2024    ROVERTO Cantu  [address] [facility] [address]    Patient: Eva Roca   YOB: 1970   Date of Visit: 7/17/2024       Dear ROVERTO Cantu  [address] [place] [address]    The attached plan of care is being sent to you because your patient’s medical reimbursement requires that you certify the plan of care. Your signature is required to allow uninterrupted insurance coverage.      You may indicate your approval by signing below and faxing this form back to us at Dept Fax: [phone].    Please call Dept: [phone] with any questions or concerns.    Thank you for this referral,        Ami Felix OT  [place] [address] [facility] [address]  [phone]    Payer: Payor: [employer] / Plan: [employer] / Product Type: *No Product type* /                                                                         Date:     Dear Ami Felix OT,     Re: Ms. Eva Roca, MRN:[id number]    I certify that I have reviewed the attached plan of care and it is medically necessary for Ms. Eva Roca (1970) who is under my care.          ______________________________________                    _________________  Provider name and credentials                                           Date and time                                                                                           Plan of Care 7/17/24   Effective from: 7/17/2024  Effective to: 8/30/2024    Plan ID: [id number]            Participants as of Finalize on 7/17/2024    Name Type Comments Contact Info    ROVERTO Cantu PCP - General  [phone]    Ami Felix OT Occupational Therapist  [phone]    Edwardo Adorno MD Consulting Physician  [phone]    ROVERTO Recio Physician Assistant         Last Plan Note     Author: Ami Felix OT Status: Sign when Signing Visit Last  edited: 2024 10:00 AM         Occupational Therapy  Occupational Therapy Treatment, Re-Check/Progress    Patient Name: Eva Roca  MRN: [id number]  : 1970  Today's Date: 2024  Time Calculation  Start Time: 1000  Stop Time: 1045  Time Calculation (min): 45 min  Today's Charges:  OT Therapeutic Procedures Time Entry  Therapeutic Activity Time Entry: 45    Current Problem  Problem List Items Addressed This Visit             ICD-10-CM    Weakness of both hands - Primary R29.898     Assessment  OT Assessment  OT Assessment Results: Decreased ADL status, Decreased upper extremity strength, Decreased endurance, Decreased sensation, Decreased fine motor control, Decreased gross motor control, Decreased IADLs  Strengths: Ability to acquire knowledge, Attitude of self, Rehab experience  Clinical Presentation: Stable and/or uncomplicated characteristics  Progress towards goals:  improved FM ability Improved ability to complete household activities. Improved performance in ADL tasks. Improved performance in IADL tasks.  Improved performance in leisure activities.  Improved ROM. Improved strength. Reduced pain level. Improved grasp.   Patient tolerated treatment satisfactorily.  Good overall progress towards goals at this time.  Still reports increased numbness that has not changed. Will continue to work on desensitization. Patient displays increased grasp strength, FM coordination and use of compensatory strategies, though she is still having difficulty with her hands.     Insurance  Payor: [employer] / Plan: [email or web address] / Product Type: *No Product type* /     Plan  Outpatient Plan  OT Plan: ; FM coodination, paper, R shoulder ROM  Frequency: 2x/wk  Duration: 4 weeks  Onset Date: 24  Certification Period Start Date: 06/10/24  Certification Period End Date: 24  Number of Treatments Authorized: 8; 30 total  Rehab Potential: Good  Plan of Care  "Agreement: Patient  Plan of Care Agreement: Patient  Planned Interventions: Therapeutic Exercise (42254), Therapeutic Activity (20256), Self-Care/Home Management (86023), Manual Therapy (76453), Neuromuscular Re-education (81775), Electrical Stimulation (03396), Kinesiotaping, Hot Packs, and Cold Packs     General  OT Last Visit  OT Received On: 07/17/24  Reason for Referral: hand weakness s/p neck surgery  C2-C7  Referred By: Tila  Family/Caregiver Present: No  Primary Language: English  Preferred Learning Style: visual, kinesthetic    Subjective  General Comment: \"numbness is the same.\"  Current condition since injury: Better   Performing HEP? Yes    Precautions  Precautions  Post-Surgical Precautions: Spinal precautions  Precautions Comment: 10# lifting restriction    Pain  Pain Assessment  Pain Assessment: 0-10  0-10 (Numeric) Pain Score: 5 - Moderate pain  Pain Type: Chronic pain  Pain Location: Neck  Pain Orientation: Right  As a result of session pt. Reported pain remained the same  End of session pain: 5/10  Aggravating Factors: Lifting/Carrying , Pulling/Pushing , and Repetitive Motion  Relieving Factors: Rest, Ice, and Heat    ROM/Strength  Right Hand Strength -  (lbs)  Handle Setting 2 (lbs): 32.67 lbs (33, 35,30)    Left Hand Strength -  (lbs)  Handle Setting 2 (lbs): 46.67 lbs (55, 45, 40)    Treatment  This therapist instructed and demonstrated interventions to patient, patient completed the following under direct supervision of this therapist:     Therapeutic Activity  Therapeutic Activity Performed: Yes Re-eval completed this date. QuickDASH completed. Goals discussed. Strength/ROM and fine motor measurements taken. Patient displays increased FM ability meeting the goal with RUE; patient met  goal on LUE very close on RUE, displays good overall use of comp strategies. Patient reports she is still having numbness and difficulty with cooking, and yard work.D/t continued deficits patient " would benefit from continued OT 1x/wk for 6wks. Patient requested 1x/wk d/t transportation difficulties. Paper crumpling completed x5 reps bilaterally. Patient engaged in paper tearing as well to encourage grasp and bilateral hand tasks. Patient was able to complete without difficulty today.     Education  Education  Individual(s) Educated: Patient  Education Provided: Anatomy & Physiology, Diagnosis & Precautions, Risk and benefits of OT discussed with patient or other, POC discussed and agreed upon  Risk and Benefits Discussed with Patient/Caregiver/Other: yes  Patient/Caregiver Demonstrated Understanding: yes  Plan of Care Discussed and Agreed Upon: yes  Patient Response to Education: Patient/Caregiver Verbalized Understanding of Information, Patient/Caregiver Performed Return Demonstration of Exercises/Activities, Patient/Caregiver Asked Appropriate Questions    HEP Provided Today: No  Edu on desensitization techniques.     Outcome Measures  OT Adult Other Outcome Measures  9 Hole Peg Test: RUE: 51.1s; LUE: 50.9s  Other Outcome Measures: QuickDASH: 41 raw = 68.18      Goals  Active       OT Goals       Pt. will demonstrate MI with stating and demonstrating home exercise program in order to improve patient's ability to perform self-care, household, and/or leisure tasks.  (Met)       Start:  06/10/24    Expected End:  06/28/24    Resolved:  07/17/24      Goal Note       7/17: completing              Pt. will increase BUE hand strength to increase participation in self-care, household, and leisure tasks. : 5/5; 35lbs  (Met)       Start:  06/10/24    Expected End:  07/12/24    Resolved:  07/17/24    Updated to: Pt. will increase RUE hand strength to increase participation in self-care, household, and leisure tasks. : 5/5; 35lbs    Update reason: partially met      Goal Note       7/17: 4+/5; RUE: 32.67; LUE: 46.67              Pt. will demo increased BUE fine motor ability as evidenced by performance on box  and blocks test and 9 hole peg test RUE: 1min; LUE: 35s (Met)       Start:  06/10/24    Expected End:  07/12/24    Resolved:  07/17/24    Updated to: Pt. will demo increased BUE fine motor ability as evidenced by performance on box and blocks test and 9 hole peg test RUE: 40s; LUE: 35s    Update reason: partially met      Goal Note       7/17: RUE 51.1s; LUE: 50.9s              Pt will report decreased numbness in bilateral hands in 75 % of all treatment sessions to improve sensation while completing daily tasks.  (Not Progressing)       Start:  06/10/24    Expected End:  07/12/24         Goal Note       7/17: reports numbness has been the same from hips up               Pt. will identify and apply compensation techniques with independence to reduce injury secondary to sensation deficits in BUE.  (Progressing)       Start:  06/10/24    Expected End:  07/12/24         Goal Note       7/17: reports she is not filling laundry baskets all the way, using vision more to adapt, holding tighter;               Pt. will increase RUE hand strength to increase participation in self-care, household, and leisure tasks. : 5/5; 35lbs       Start:  07/17/24    Expected End:  07/12/24                Pt. will demo increased BUE fine motor ability as evidenced by performance on box and blocks test and 9 hole peg test RUE: 40s; LUE: 35s       Start:  07/17/24    Expected End:  07/12/24                    Ami Felix OTR/L           Current Participants as of 7/17/2024    Name Type Comments Contact Info    ROVERTO Cantu PCP - General  [phone]    Signature pending    Edwardo Adorno MD Consulting Physician  [phone]    Signature pending    ROVERTO Recio Physician Assistant      Signature pending    Ami Felix OT Occupational Therapist  [phone]    Electronically signed by Ami Felix OT at 7/17/2024 1048 EDT

## 2024-07-17 NOTE — LETTER
July 17, 2024    ROVERTO Recio    Patient: Eva Roca   YOB: 1970   Date of Visit: 7/17/2024       Dear ROVERTO Recio  No address on file    The attached plan of care is being sent to you because your patient’s medical reimbursement requires that you certify the plan of care. Your signature is required to allow uninterrupted insurance coverage.      You may indicate your approval by signing below and faxing this form back to us at Dept Fax: [phone].    Please call Dept: [phone] with any questions or concerns.    Thank you for this referral,        Eduard Rausch PT  [place] [address] [facility] [id number] [phone]    Payer: Payor: [employer]ICA[employer] / Plan: [employer]ICAID / Product Type: *No Product type* /                                                                         Date:     Dear Eduard Rausch PT,     Re: Ms. Eva Roca, MRN:[id number]    I certify that I have reviewed the attached plan of care and it is medically necessary for Ms. Eva Roca (1970) who is under my care.          ______________________________________                    _________________  Provider name and credentials                                           Date and time                                                                                           Plan of Care 6/10/24   Effective from: 6/10/2024  Effective to: 8/21/2024    Plan ID: [id number]            Participants as of Finalize on 7/17/2024    Name Type Comments Contact Info    ROVERTO Recio Referring Provider      Eduard Rausch PT Physical Therapist  [phone]       Last Plan Note     Author: Eduard Rausch PT Status: Sign when Signing Visit Last edited: 7/17/2024 10:45 AM         Physical Therapy Re-Evaluation and Treatment    Patient Name: Eva Roca  MRN: [id number]  Today's Date: 7/17/2024  Time Calculation  Start Time:  1049  Stop Time: 1129  Time Calculation (min): 40 min        PT Therapeutic Procedures Time Entry  Therapeutic Exercise Time Entry: 40                Current Problem  1. Other spondylosis with myelopathy, cervical region  Follow Up In Physical Therapy        Problem List Items Addressed This Visit             ICD-10-CM    Other spondylosis with myelopathy, cervical region - Primary M47.12       General  Reason for Referral: neck pain  Referred By: Kings LAYNE  Preferred Learning Style: visual, kinesthetic    Subjective   Current Condition:   Better  Patient reports that her walking is getting better. She states that she still has numbness in her hands and trunk. Patient states that she feels like her skin is burning in her arms at the end of the day.    Performing HEP?: Yes    Precautions  Precautions  Post-Surgical Precautions: Spinal precautions  Precautions Comment: 10# lifting restriction  Pain  Pain Assessment: 0-10  0-10 (Numeric) Pain Score: 5 - Moderate pain  Pain Location: Neck  Pain Orientation: Right    Objective   Cervical Spine    Cervical AROM  Cervical flexion: (80°): 33  Cervical extension: (50°): 15  Cervical rotation right: (80°): 34  Cervical rotation left: (80°): 43  Cervical sidebend right: (45°): 18  Cervical sidebend left: (45°): 8    Shoulder AROM  Shoulder AROM WFL: yes    Shoulder Strength  R shoulder flexion: (5/5): 4-/5  L shoulder flexion: (5/5): 4/5  R shoulder abduction: (5/5): 4-/5  L shoulder abduction: (5/5): 4/5  R shoulder ER: (5/5): 4/5  L shoulder ER: (5/5): 5/5  R shoulder IR: (5/5) : 4+/5  L shoulder IR: (5/5): 5/5    DTR   R Biceps C5: (2+): 3+  L Biceps C5: (2+): 3+  R Brachioradialis C6: (2+): 3+  L Brachioradialis C6: (2+): 3+    Outcome Measures:  Other Measures  Neck Disability Index: 46%    Treatments:  Therapeutic Exercise  Therapeutic Exercise Performed: Yes  Therapeutic Exercise Activity 1: cervical rotation and lateral flexion x10  Therapeutic Exercise Activity 2:  shoulder rolls x10 fwd and bwd  Therapeutic Exercise Activity 3: shoulder flexion with wand @ 35* angle x15  Therapeutic Exercise Activity 5: scapular protraction with wand x15  Therapeutic Exercise Activity 6: Alphabet 3/4# 1x R/L  Therapeutic Exercise Activity 7: scapular retraction grn x20  Therapeutic Exercise Activity 8: B Shoulder Extension grn x20         EDUCATION:   Individual(s) Educated: Patient  Education Provided: yes  Handout(s) Provided: Scanned into chart  Home Program: reviewed home exercise program   Risk and Benefits Discussed with Patient/Caregiver/Other: Yes   Patient/Caregiver Demonstrated Understanding: Yes   Patient Response to Education: Patient/Caregiver verbalized understanding of information and Patient/Caregiver performed return demonstration of exercises/activities    Assessment: Patient is progressing slowly towards her physical therapy goals. Patient's shoulder strength is improving nicely, but her range of motion is improving more slowly. Patient's biceps and brachioradialis DTR are brisk (3+) in bilateral upper extremities. Skilled physical therapy is warranted to address the above stated impairments, so the patient can perform functional activities and work duties without increased pain or difficulty.   PT Assessment  PT Assessment Results: Decreased strength, Decreased range of motion, Impaired balance, Decreased mobility, Impaired sensation, Orthopedic restrictions, Pain  Rehab Prognosis: Good  Evaluation/Treatment Tolerance: Patient tolerated treatment well    Plan: Continue with POC. Progress exercises as tolerated.  OP PT Plan  Treatment/Interventions: Education/ Instruction, Manual therapy, Neuromuscular re-education, Therapeutic activities, Therapeutic exercises  PT Plan: Skilled PT  PT Frequency: 1 time per week  Duration: 5 weeks  Onset Date: 06/03/24  Certification Period Start Date: 06/10/24  Certification Period End Date: 07/15/24  Number of Treatments Authorized: 7 of  12  Rehab Potential: Good  Plan of Care Agreement: Patient    Goals:  Active       PT Problem       Patient will achieve bilateral cervical side bending ROM at least 30 degrees (Progressing)       Start:  06/10/24    Expected End:  08/21/24            Patient will achieve bilateral cervical rotation ROM at least 60 degrees (Progressing)       Start:  06/10/24    Expected End:  08/21/24            Patient will achieve cervical flexion ROM at least 50 degrees (Progressing)       Start:  06/10/24    Expected End:  08/21/24            Patient will achieve cervical extension ROM at least 40 degrees (Not Progressing)       Start:  06/10/24    Expected End:  08/21/24            Patient will achieve bilateral shoulder flexion strength of at least 4+/5 (Progressing)       Start:  06/10/24    Expected End:  08/21/24            Patient will achieve bilateral shoulder abduction strength of at least 4+/5 (Progressing)       Start:  06/10/24    Expected End:  08/21/24            Patient will achieve bilateral shoulder external rotation strength of at least 4+/5 in neutral (Progressing)       Start:  06/10/24    Expected End:  08/21/24            Patient will achieve bilateral shoulder internal rotation strength of at least 4+/5 in neutral (Met)       Start:  06/10/24    Expected End:  07/15/24    Resolved:  07/17/24         Patient will demonstrate independence in home program for support of progression (Met)       Start:  06/10/24    Expected End:  06/24/24    Resolved:  07/17/24         Patient will report pain of no more than 2/10 demonstrating a reduction of overall pain (Progressing)       Start:  06/10/24    Expected End:  08/21/24            Patient will show a significant change in NDI (62% to 52%) patient reported outcome tool to demonstrate subjective imporovement (Met)       Start:  06/10/24    Expected End:  07/15/24    Resolved:  07/17/24         PATIENT WILL SHOW A SIGNIFICANT CHANGE IN NDI (46% to 36%) PATIENT  REPORTED OUTCOME TOOL TO DEMONSTRATE SUBJECTIVE IMPROVEMENT       Start:  07/17/24    Expected End:  08/21/24                     Eduard Rausch PT           Current Participants as of 7/17/2024    Name Type Comments Contact Info    ROVERTO Recio Referring Provider      Signature pending    Eduard Rausch, PT Physical Therapist  [phone]

## 2024-07-17 NOTE — LETTER
July 17, 2024    ROVERTO Recio    Patient: Eva Roca   YOB: 1970   Date of Visit: 7/17/2024       Dear ROVERTO Cantu  [address] [place] [address]    The attached plan of care is being sent to you because your patient’s medical reimbursement requires that you certify the plan of care. Your signature is required to allow uninterrupted insurance coverage.      You may indicate your approval by signing below and faxing this form back to us at Dept Fax: [phone].    Please call Dept: [phone] with any questions or concerns.    Thank you for this referral,        Ami Felix OT  [place] [address] [facility] [address] [phone]    Payer: Payor: [employer] / Plan: [employer] / Product Type: *No Product type* /                                                                         Date:     Dear Ami Felix OT,     Re: Ms. Eva Roca, MRN:[id number]    I certify that I have reviewed the attached plan of care and it is medically necessary for Ms. Eva Roca (1970) who is under my care.          ______________________________________                    _________________  Provider name and credentials                                           Date and time                                                                                           Plan of Care 7/17/24   Effective from: 7/17/2024  Effective to: 8/30/2024    Plan ID: [id number]            Participants as of Finalize on 7/17/2024    Name Type Comments Contact Info    ROVERTO Cantu PCP - General  [phone]    Ami Felix OT Occupational Therapist  [phone]    Edwardo Adorno MD Consulting Physician  [phone]    ROVERTO Recio Physician Assistant         Last Plan Note     Author: Ami Felix OT Status: Sign when Signing Visit Last edited: 7/17/2024 10:00 AM         Occupational  Therapy  Occupational Therapy Treatment, Re-Check/Progress    Patient Name: Eva Roca  MRN: [id number]  : 1970  Today's Date: 2024  Time Calculation  Start Time: 1000  Stop Time: 1045  Time Calculation (min): 45 min  Today's Charges:  OT Therapeutic Procedures Time Entry  Therapeutic Activity Time Entry: 45    Current Problem  Problem List Items Addressed This Visit             ICD-10-CM    Weakness of both hands - Primary R29.898     Assessment  OT Assessment  OT Assessment Results: Decreased ADL status, Decreased upper extremity strength, Decreased endurance, Decreased sensation, Decreased fine motor control, Decreased gross motor control, Decreased IADLs  Strengths: Ability to acquire knowledge, Attitude of self, Rehab experience  Clinical Presentation: Stable and/or uncomplicated characteristics  Progress towards goals:  improved FM ability Improved ability to complete household activities. Improved performance in ADL tasks. Improved performance in IADL tasks.  Improved performance in leisure activities.  Improved ROM. Improved strength. Reduced pain level. Improved grasp.   Patient tolerated treatment satisfactorily.  Good overall progress towards goals at this time.  Still reports increased numbness that has not changed. Will continue to work on desensitization. Patient displays increased grasp strength, FM coordination and use of compensatory strategies, though she is still having difficulty with her hands.     Insurance  Payor: [employer] / Plan: [employer] / Product Type: *No Product type* /     Plan  Outpatient Plan  OT Plan: ; FM coodination, paper, R shoulder ROM  Frequency: 2x/wk  Duration: 4 weeks  Onset Date: 24  Certification Period Start Date: 06/10/24  Certification Period End Date: 24  Number of Treatments Authorized: 8; 30 total  Rehab Potential: Good  Plan of Care Agreement: Patient  Plan of Care Agreement:  "Patient  Planned Interventions: Therapeutic Exercise (95494), Therapeutic Activity (82174), Self-Care/Home Management (02161), Manual Therapy (66344), Neuromuscular Re-education (41887), Electrical Stimulation (91653), Kinesiotaping, Hot Packs, and Cold Packs     General  OT Last Visit  OT Received On: 07/17/24  Reason for Referral: hand weakness s/p neck surgery  C2-C7  Referred By: Tila  Family/Caregiver Present: No  Primary Language: English  Preferred Learning Style: visual, kinesthetic    Subjective  General Comment: \"numbness is the same.\"  Current condition since injury: Better   Performing HEP? Yes    Precautions  Precautions  Post-Surgical Precautions: Spinal precautions  Precautions Comment: 10# lifting restriction    Pain  Pain Assessment  Pain Assessment: 0-10  0-10 (Numeric) Pain Score: 5 - Moderate pain  Pain Type: Chronic pain  Pain Location: Neck  Pain Orientation: Right  As a result of session pt. Reported pain remained the same  End of session pain: 5/10  Aggravating Factors: Lifting/Carrying , Pulling/Pushing , and Repetitive Motion  Relieving Factors: Rest, Ice, and Heat    ROM/Strength  Right Hand Strength -  (lbs)  Handle Setting 2 (lbs): 32.67 lbs (33, 35,30)    Left Hand Strength -  (lbs)  Handle Setting 2 (lbs): 46.67 lbs (55, 45, 40)    Treatment  This therapist instructed and demonstrated interventions to patient, patient completed the following under direct supervision of this therapist:     Therapeutic Activity  Therapeutic Activity Performed: Yes Re-eval completed this date. QuickDASH completed. Goals discussed. Strength/ROM and fine motor measurements taken. Patient displays increased FM ability meeting the goal with RUE; patient met  goal on LUE very close on RUE, displays good overall use of comp strategies. Patient reports she is still having numbness and difficulty with cooking, and yard work.D/t continued deficits patient would benefit from continued OT 1x/wk for " 6wks. Patient requested 1x/wk d/t transportation difficulties. Paper crumpling completed x5 reps bilaterally. Patient engaged in paper tearing as well to encourage grasp and bilateral hand tasks. Patient was able to complete without difficulty today.     Education  Education  Individual(s) Educated: Patient  Education Provided: Anatomy & Physiology, Diagnosis & Precautions, Risk and benefits of OT discussed with patient or other, POC discussed and agreed upon  Risk and Benefits Discussed with Patient/Caregiver/Other: yes  Patient/Caregiver Demonstrated Understanding: yes  Plan of Care Discussed and Agreed Upon: yes  Patient Response to Education: Patient/Caregiver Verbalized Understanding of Information, Patient/Caregiver Performed Return Demonstration of Exercises/Activities, Patient/Caregiver Asked Appropriate Questions    HEP Provided Today: No  Edu on desensitization techniques.     Outcome Measures  OT Adult Other Outcome Measures  9 Hole Peg Test: RUE: 51.1s; LUE: 50.9s  Other Outcome Measures: QuickDASH: 41 raw = 68.18      Goals  Active       OT Goals       Pt. will demonstrate MI with stating and demonstrating home exercise program in order to improve patient's ability to perform self-care, household, and/or leisure tasks.  (Met)       Start:  06/10/24    Expected End:  06/28/24    Resolved:  07/17/24      Goal Note       7/17: completing              Pt. will increase BUE hand strength to increase participation in self-care, household, and leisure tasks. : 5/5; 35lbs  (Met)       Start:  06/10/24    Expected End:  07/12/24    Resolved:  07/17/24    Updated to: Pt. will increase RUE hand strength to increase participation in self-care, household, and leisure tasks. : 5/5; 35lbs    Update reason: partially met      Goal Note       7/17: 4+/5; RUE: 32.67; LUE: 46.67              Pt. will demo increased BUE fine motor ability as evidenced by performance on box and blocks test and 9 hole peg test RUE:  1min; LUE: 35s (Met)       Start:  06/10/24    Expected End:  07/12/24    Resolved:  07/17/24    Updated to: Pt. will demo increased BUE fine motor ability as evidenced by performance on box and blocks test and 9 hole peg test RUE: 40s; LUE: 35s    Update reason: partially met      Goal Note       7/17: RUE 51.1s; LUE: 50.9s              Pt will report decreased numbness in bilateral hands in 75 % of all treatment sessions to improve sensation while completing daily tasks.  (Not Progressing)       Start:  06/10/24    Expected End:  07/12/24         Goal Note       7/17: reports numbness has been the same from hips up               Pt. will identify and apply compensation techniques with independence to reduce injury secondary to sensation deficits in BUE.  (Progressing)       Start:  06/10/24    Expected End:  07/12/24         Goal Note       7/17: reports she is not filling laundry baskets all the way, using vision more to adapt, holding tighter;               Pt. will increase RUE hand strength to increase participation in self-care, household, and leisure tasks. : 5/5; 35lbs       Start:  07/17/24    Expected End:  07/12/24                Pt. will demo increased BUE fine motor ability as evidenced by performance on box and blocks test and 9 hole peg test RUE: 40s; LUE: 35s       Start:  07/17/24    Expected End:  07/12/24                    Ami Felix OTR/L           Current Participants as of 7/17/2024    Name Type Comments Contact Info    ROVERTO Cantu PCP - General  [phone]    Signature pending    Edwardo Adorno MD Consulting Physician  [phone]    Signature pending    ROVERTO Recio Physician Assistant      Signature pending    Ami Felix OT Occupational Therapist  [phone]    Electronically signed by Ami Felix OT at 7/17/2024 1048 EDT

## 2024-07-22 ENCOUNTER — APPOINTMENT (OUTPATIENT)
Dept: OCCUPATIONAL THERAPY | Facility: HOSPITAL | Age: 54
End: 2024-07-22
Payer: MEDICAID

## 2024-07-22 ENCOUNTER — APPOINTMENT (OUTPATIENT)
Dept: PHYSICAL THERAPY | Facility: HOSPITAL | Age: 54
End: 2024-07-22
Payer: MEDICAID

## 2024-07-22 ENCOUNTER — DOCUMENTATION (OUTPATIENT)
Dept: OCCUPATIONAL THERAPY | Facility: HOSPITAL | Age: 54
End: 2024-07-22
Payer: MEDICAID

## 2024-07-22 NOTE — PROGRESS NOTES
Occupational Therapy  Therapy Communication Note    Patient Name: Eva Roca  MRN: [id number]  Today's Date: 7/22/2024     Discipline: Occupational Therapy    Missed Visit Reason:      Missed Time: Cancel    Comment: Patient canceled via automated system. No reason provided.

## 2024-07-31 ENCOUNTER — DOCUMENTATION (OUTPATIENT)
Dept: OCCUPATIONAL THERAPY | Facility: HOSPITAL | Age: 54
End: 2024-07-31
Payer: MEDICAID

## 2024-07-31 ENCOUNTER — DOCUMENTATION (OUTPATIENT)
Dept: PHYSICAL THERAPY | Facility: HOSPITAL | Age: 54
End: 2024-07-31
Payer: MEDICAID

## 2024-07-31 NOTE — PROGRESS NOTES
Occupational Therapy  Therapy Communication Note    Patient Name: Eva Roca  MRN: [id number]  Today's Date: 7/31/2024     Discipline: Occupational Therapy    Missed Time: No Show    Comment: Patient phoned to alert her of missed appt. Patient reported she called an left a message to reschedule appt. Patient not sure if she called the wrong number. Patient stated she will call back to reschedule appts.

## 2024-07-31 NOTE — PROGRESS NOTES
Physical Therapy                 Therapy Communication Note    Patient Name: Eva Roca  MRN: [id number]  Today's Date: 7/31/2024     Discipline: Physical Therapy    Missed Visit Reason:      Missed Time: No Show    Comment:

## 2024-08-19 ENCOUNTER — DOCUMENTATION (OUTPATIENT)
Dept: OCCUPATIONAL THERAPY | Facility: HOSPITAL | Age: 54
End: 2024-08-19
Payer: MEDICAID

## 2024-08-19 NOTE — PROGRESS NOTES
"Occupational Therapy    Discharge Summary    Name: Eva Roca  MRN: [id number]  : 1970  Date: 24    Discharge Summary: OT    Discharge Information: Date of discharge 24, Date of last visit 24, Date of evaluation 6/10/24, Number of attended visits 7, Referred by Dr. Adorno, and Referred for hand weakness s/p neck surgery    Therapy Summary: Re-check completed at pt's last visit. Copied from last visit: \"Patient displays increased FM ability meeting the goal with RUE; patient met  goal on LUE very close on RUE, displays good overall use of comp strategies. Patient reports she is still having numbness and difficulty with cooking, and yard work.D/t continued deficits patient would benefit from continued OT 1x/wk for 6wks.\" Goals set to address hand strength, FM ability, sensation, compensation techniques.     Discharge Status: Goals not met as patient did not return to therapy.      Rehab Discharge Reason: Failed to schedule and/or keep follow-up appointment(s)  "

## 2024-08-27 ENCOUNTER — TREATMENT (OUTPATIENT)
Dept: PHYSICAL THERAPY | Facility: HOSPITAL | Age: 54
End: 2024-08-27
Payer: MEDICAID

## 2024-08-27 DIAGNOSIS — M47.12 OTHER SPONDYLOSIS WITH MYELOPATHY, CERVICAL REGION: Primary | ICD-10-CM

## 2024-08-27 PROCEDURE — 97140 MANUAL THERAPY 1/> REGIONS: CPT | Mod: GP | Performed by: PHYSICAL THERAPIST

## 2024-08-27 PROCEDURE — 97110 THERAPEUTIC EXERCISES: CPT | Mod: GP | Performed by: PHYSICAL THERAPIST

## 2024-08-27 ASSESSMENT — PAIN SCALES - GENERAL: PAINLEVEL_OUTOF10: 5 - MODERATE PAIN

## 2024-08-27 ASSESSMENT — PAIN - FUNCTIONAL ASSESSMENT: PAIN_FUNCTIONAL_ASSESSMENT: 0-10

## 2024-08-27 NOTE — LETTER
August 27, 2024    ROVERTO Recio    Patient: Eva Roca   YOB: 1970   Date of Visit: 8/27/2024       Dear ROVERTO Recio  No address on file    The attached plan of care is being sent to you because your patient’s medical reimbursement requires that you certify the plan of care. Your signature is required to allow uninterrupted insurance coverage.      You may indicate your approval by signing below and faxing this form back to us at Dept Fax: [phone].    Please call Dept: [phone] with any questions or concerns.    Thank you for this referral,        Eduard Rausch PT  [place] [address] [facility] [id number] [phone]    Payer: Payor: [employer] / Plan: [employer]ICAID / Product Type: *No Product type* /                                                                         Date:     Dear Eduard Rausch PT,     Re: Ms. Eva Roca, MRN:[id number]    I certify that I have reviewed the attached plan of care and it is medically necessary for Ms. Eva Roca (1970) who is under my care.          ______________________________________                    _________________  Provider name and credentials                                           Date and time                                                                                           Plan of Care 6/10/24   Effective from: 6/10/2024  Effective to: 9/24/2024    Plan ID: [id number]            Participants as of Finalize on 8/27/2024    Name Type Comments Contact Info    ROVERTO Recio Referring Provider      Eduard Rausch PT Physical Therapist  [phone]       Last Plan Note     Author: Eduard Rausch PT Status: Sign when Signing Visit Last edited: 8/27/2024  2:30 PM         Physical Therapy Re-Evaluation and Treatment    Patient Name: Eva Roca  MRN: [id number]  Today's Date: 8/27/2024  Time Calculation  Start Time:  1433  Stop Time: 1510  Time Calculation (min): 37 min        PT Therapeutic Procedures Time Entry  Manual Therapy Time Entry: 23  Therapeutic Exercise Time Entry: 14                Current Problem  1. Other spondylosis with myelopathy, cervical region  Follow Up In Physical Therapy        Problem List Items Addressed This Visit             ICD-10-CM    Other spondylosis with myelopathy, cervical region - Primary M47.12       General  Reason for Referral: neck pain  Referred By: Kings LAYNE  Preferred Learning Style: visual, kinesthetic    Subjective   Current Condition:   Same  Patient reports continued stiffness in her neck. Patient states that she occasionally get burning sensation in both anterior upper arms between her shoulders and elbows. Patient states that is feels like she has a very tight corset on.    Performing HEP?: Yes    Precautions  Precautions  Post-Surgical Precautions: Spinal precautions  Precautions Comment: 10# lifting restriction    Pain  Pain Assessment: 0-10  0-10 (Numeric) Pain Score: 5 - Moderate pain  Pain Location: Neck  Pain Orientation: Right    Objective     Cervical AROM  Cervical flexion: (80°): 40  Cervical extension: (50°): 38  Cervical rotation right: (80°): 40  Cervical rotation left: (80°): 45  Cervical sidebend right: (45°): 22  Cervical sidebend left: (45°): 25    DTR  R Biceps C5: (2+): 3+  L Biceps C5: (2+): 3+  R Brachioradialis C6: (2+): 3+  L Brachioradialis C6: (2+): 3+    Shoulder Strength  R shoulder flexion: (5/5): 4/5  L shoulder flexion: (5/5): 4-/5  R shoulder abduction: (5/5): 4-/5  L shoulder abduction: (5/5): 4-/5  R shoulder ER: (5/5): 4/5  L shoulder ER: (5/5): 4+/5  R shoulder IR: (5/5) : 4+/5  L shoulder IR: (5/5): 4+/5    DTR   R Biceps C5: (2+): 3+  L Biceps C5: (2+): 3+  R Brachioradialis C6: (2+): 3+  L Brachioradialis C6: (2+): 3+     Strength   R  strength: 35.383 lbs at position 3  L  strength: 37.5 lbs at position 3    Outcome  Measures:  Other Measures  Neck Disability Index: 48%    Treatments:  Therapeutic Exercise  Therapeutic Exercise Performed: Yes  Therapeutic Exercise Activity 1: cervical rotation and lateral flexion x10  Therapeutic Exercise Activity 2: shoulder rolls x10 fwd and bwd    Manual Therapy  Manual Therapy Performed: Yes  Manual Therapy Activity 3: IASTM upper traps and levator scap  Manual Therapy Activity 4: Trigger point release to Bilat traps    EDUCATION:   Individual(s) Educated: Patient  Education Provided: yes  Handout(s) Provided: Scanned into chart  Home Program: reviewed home exercise program   Risk and Benefits Discussed with Patient/Caregiver/Other: Yes   Patient/Caregiver Demonstrated Understanding: Yes   Patient Response to Education: Patient/Caregiver verbalized understanding of information and Patient/Caregiver performed return demonstration of exercises/activities    Assessment: Patient demonstrated improved cervical spine range of motion compared to her last visit. Patient continues to have impaired bilateral strength and hyperreflexia. Patient demonstrated increased muscle tension to her bilateral upper traps which appeared to improve with IASTM and trigger point release. Skilled physical therapy is warranted to address the above stated impairments, so the patient can perform functional activities and work duties without increased pain or difficulty.   PT Assessment  PT Assessment Results: Decreased strength, Decreased range of motion, Impaired balance, Decreased mobility, Impaired sensation, Orthopedic restrictions, Pain  Rehab Prognosis: Good  Evaluation/Treatment Tolerance: Patient tolerated treatment well    Plan: Continue with POC. Progress exercises as tolerated to improve patient's functional mobility and ability to perform ADLs.  OP PT Plan  Treatment/Interventions: Education/ Instruction, Manual therapy, Neuromuscular re-education, Therapeutic activities, Therapeutic exercises  PT Plan:  Skilled PT  PT Frequency: 1 time per week  Duration: 4 weeks  Onset Date: 06/03/24  Certification Period Start Date: 06/10/24  Certification Period End Date: 09/24/24  Number of Treatments Authorized: 8 of 12  Rehab Potential: Good  Plan of Care Agreement: Patient    Goals:  Active       PT Problem       Patient will achieve bilateral cervical side bending ROM at least 30 degrees (Progressing)       Start:  06/10/24    Expected End:  09/24/24            Patient will achieve bilateral cervical rotation ROM at least 60 degrees (Progressing)       Start:  06/10/24    Expected End:  09/24/24            Patient will achieve cervical flexion ROM at least 50 degrees (Progressing)       Start:  06/10/24    Expected End:  09/24/24            Patient will achieve cervical extension ROM at least 40 degrees (Progressing)       Start:  06/10/24    Expected End:  09/24/24            Patient will achieve bilateral shoulder flexion strength of at least 4+/5 (Not Progressing)       Start:  06/10/24    Expected End:  09/24/24            Patient will achieve bilateral shoulder abduction strength of at least 4+/5 (Not Progressing)       Start:  06/10/24    Expected End:  09/24/24            Patient will achieve bilateral shoulder external rotation strength of at least 4+/5 in neutral (Progressing)       Start:  06/10/24    Expected End:  09/24/24            Patient will achieve bilateral shoulder internal rotation strength of at least 4+/5 in neutral (Met)       Start:  06/10/24    Expected End:  07/15/24    Resolved:  07/17/24         Patient will demonstrate independence in home program for support of progression (Met)       Start:  06/10/24    Expected End:  06/24/24    Resolved:  07/17/24         Patient will report pain of no more than 2/10 demonstrating a reduction of overall pain (Not Progressing)       Start:  06/10/24    Expected End:  09/24/24            Patient will show a significant change in NDI (62% to 52%) patient  reported outcome tool to demonstrate subjective imporovement (Met)       Start:  06/10/24    Expected End:  07/15/24    Resolved:  07/17/24         PATIENT WILL SHOW A SIGNIFICANT CHANGE IN NDI (46% to 36%) PATIENT REPORTED OUTCOME TOOL TO DEMONSTRATE SUBJECTIVE IMPROVEMENT (Not Progressing)       Start:  07/17/24    Expected End:  09/24/24                PATIENT WILL ACHIEVE bilateral  STRENGTH OF at least 45 lbs IN THE three position       Start:  08/27/24    Expected End:  09/24/24                     Eduard Rausch PT           Current Participants as of 8/27/2024    Name Type Comments Contact Info    ROVERTO Recio Referring Provider      Signature pending    Eduard Rausch, PT Physical Therapist  [phone]

## 2024-08-27 NOTE — PROGRESS NOTES
Physical Therapy Re-Evaluation and Treatment    Patient Name: Eva Roca  MRN: [id number]  Today's Date: 8/27/2024  Time Calculation  Start Time: 1433  Stop Time: 1510  Time Calculation (min): 37 min        PT Therapeutic Procedures Time Entry  Manual Therapy Time Entry: 23  Therapeutic Exercise Time Entry: 14                Current Problem  1. Other spondylosis with myelopathy, cervical region  Follow Up In Physical Therapy        Problem List Items Addressed This Visit             ICD-10-CM    Other spondylosis with myelopathy, cervical region - Primary M47.12       General  Reason for Referral: neck pain  Referred By: Kings LAYNE  Preferred Learning Style: visual, kinesthetic    Subjective   Current Condition:   Same  Patient reports continued stiffness in her neck. Patient states that she occasionally get burning sensation in both anterior upper arms between her shoulders and elbows. Patient states that is feels like she has a very tight corset on.    Performing HEP?: Yes    Precautions  Precautions  Post-Surgical Precautions: Spinal precautions  Precautions Comment: 10# lifting restriction    Pain  Pain Assessment: 0-10  0-10 (Numeric) Pain Score: 5 - Moderate pain  Pain Location: Neck  Pain Orientation: Right    Objective     Cervical AROM  Cervical flexion: (80°): 40  Cervical extension: (50°): 38  Cervical rotation right: (80°): 40  Cervical rotation left: (80°): 45  Cervical sidebend right: (45°): 22  Cervical sidebend left: (45°): 25    DTR  R Biceps C5: (2+): 3+  L Biceps C5: (2+): 3+  R Brachioradialis C6: (2+): 3+  L Brachioradialis C6: (2+): 3+    Shoulder Strength  R shoulder flexion: (5/5): 4/5  L shoulder flexion: (5/5): 4-/5  R shoulder abduction: (5/5): 4-/5  L shoulder abduction: (5/5): 4-/5  R shoulder ER: (5/5): 4/5  L shoulder ER: (5/5): 4+/5  R shoulder IR: (5/5) : 4+/5  L shoulder IR: (5/5): 4+/5    DTR   R Biceps C5: (2+): 3+  L Biceps C5: (2+): 3+  R Brachioradialis C6: (2+): 3+  L  Brachioradialis C6: (2+): 3+     Strength   R  strength: 35.383 lbs at position 3  L  strength: 37.5 lbs at position 3    Outcome Measures:  Other Measures  Neck Disability Index: 48%    Treatments:  Therapeutic Exercise  Therapeutic Exercise Performed: Yes  Therapeutic Exercise Activity 1: cervical rotation and lateral flexion x10  Therapeutic Exercise Activity 2: shoulder rolls x10 fwd and bwd    Manual Therapy  Manual Therapy Performed: Yes  Manual Therapy Activity 3: IASTM upper traps and levator scap  Manual Therapy Activity 4: Trigger point release to Bilat traps    EDUCATION:   Individual(s) Educated: Patient  Education Provided: yes  Handout(s) Provided: Scanned into chart  Home Program: reviewed home exercise program   Risk and Benefits Discussed with Patient/Caregiver/Other: Yes   Patient/Caregiver Demonstrated Understanding: Yes   Patient Response to Education: Patient/Caregiver verbalized understanding of information and Patient/Caregiver performed return demonstration of exercises/activities    Assessment: Patient demonstrated improved cervical spine range of motion compared to her last visit. Patient continues to have impaired bilateral strength and hyperreflexia. Patient demonstrated increased muscle tension to her bilateral upper traps which appeared to improve with IASTM and trigger point release. Skilled physical therapy is warranted to address the above stated impairments, so the patient can perform functional activities and work duties without increased pain or difficulty.   PT Assessment  PT Assessment Results: Decreased strength, Decreased range of motion, Impaired balance, Decreased mobility, Impaired sensation, Orthopedic restrictions, Pain  Rehab Prognosis: Good  Evaluation/Treatment Tolerance: Patient tolerated treatment well    Plan: Continue with POC. Progress exercises as tolerated to improve patient's functional mobility and ability to perform ADLs.  OP PT  Plan  Treatment/Interventions: Education/ Instruction, Manual therapy, Neuromuscular re-education, Therapeutic activities, Therapeutic exercises  PT Plan: Skilled PT  PT Frequency: 1 time per week  Duration: 4 weeks  Onset Date: 06/03/24  Certification Period Start Date: 06/10/24  Certification Period End Date: 09/24/24  Number of Treatments Authorized: 8 of 12  Rehab Potential: Good  Plan of Care Agreement: Patient    Goals:  Active       PT Problem       Patient will achieve bilateral cervical side bending ROM at least 30 degrees (Progressing)       Start:  06/10/24    Expected End:  09/24/24            Patient will achieve bilateral cervical rotation ROM at least 60 degrees (Progressing)       Start:  06/10/24    Expected End:  09/24/24            Patient will achieve cervical flexion ROM at least 50 degrees (Progressing)       Start:  06/10/24    Expected End:  09/24/24            Patient will achieve cervical extension ROM at least 40 degrees (Progressing)       Start:  06/10/24    Expected End:  09/24/24            Patient will achieve bilateral shoulder flexion strength of at least 4+/5 (Not Progressing)       Start:  06/10/24    Expected End:  09/24/24            Patient will achieve bilateral shoulder abduction strength of at least 4+/5 (Not Progressing)       Start:  06/10/24    Expected End:  09/24/24            Patient will achieve bilateral shoulder external rotation strength of at least 4+/5 in neutral (Progressing)       Start:  06/10/24    Expected End:  09/24/24            Patient will achieve bilateral shoulder internal rotation strength of at least 4+/5 in neutral (Met)       Start:  06/10/24    Expected End:  07/15/24    Resolved:  07/17/24         Patient will demonstrate independence in home program for support of progression (Met)       Start:  06/10/24    Expected End:  06/24/24    Resolved:  07/17/24         Patient will report pain of no more than 2/10 demonstrating a reduction of overall  pain (Not Progressing)       Start:  06/10/24    Expected End:  09/24/24            Patient will show a significant change in NDI (62% to 52%) patient reported outcome tool to demonstrate subjective imporovement (Met)       Start:  06/10/24    Expected End:  07/15/24    Resolved:  07/17/24         PATIENT WILL SHOW A SIGNIFICANT CHANGE IN NDI (46% to 36%) PATIENT REPORTED OUTCOME TOOL TO DEMONSTRATE SUBJECTIVE IMPROVEMENT (Not Progressing)       Start:  07/17/24    Expected End:  09/24/24                PATIENT WILL ACHIEVE bilateral  STRENGTH OF at least 45 lbs IN THE three position       Start:  08/27/24    Expected End:  09/24/24                     Eduard Rausch, PT

## 2024-09-03 ENCOUNTER — TREATMENT (OUTPATIENT)
Dept: PHYSICAL THERAPY | Facility: HOSPITAL | Age: 54
End: 2024-09-03
Payer: MEDICAID

## 2024-09-03 DIAGNOSIS — M47.12 OTHER SPONDYLOSIS WITH MYELOPATHY, CERVICAL REGION: Primary | ICD-10-CM

## 2024-09-03 PROCEDURE — 97110 THERAPEUTIC EXERCISES: CPT | Mod: GP | Performed by: PHYSICAL THERAPIST

## 2024-09-03 ASSESSMENT — PAIN SCALES - GENERAL: PAINLEVEL_OUTOF10: 5 - MODERATE PAIN

## 2024-09-03 ASSESSMENT — PAIN - FUNCTIONAL ASSESSMENT: PAIN_FUNCTIONAL_ASSESSMENT: 0-10

## 2024-09-03 NOTE — PROGRESS NOTES
Physical Therapy Treatment    Patient Name: Eva Roca  MRN: [id number]  Today's Date: 9/3/2024  Time Calculation  Start Time: 1348  Stop Time: 1429  Time Calculation (min): 41 min        PT Therapeutic Procedures Time Entry  Manual Therapy Time Entry: 3  Therapeutic Exercise Time Entry: 38                Current Problem  1. Other spondylosis with myelopathy, cervical region  Follow Up In Physical Therapy        Problem List Items Addressed This Visit             ICD-10-CM    Other spondylosis with myelopathy, cervical region - Primary M47.12       General  Reason for Referral: neck pain  Referred By: Kings LAYNE  Preferred Learning Style: visual, kinesthetic    Subjective   Current Condition:   Better  Patient reports feeling like she is getting a little bit stronger.    Performing HEP?: Yes    Precautions  Precautions  Post-Surgical Precautions: Spinal precautions  Precautions Comment: 10# lifting restriction  Pain  Pain Assessment: 0-10  0-10 (Numeric) Pain Score: 5 - Moderate pain  Pain Location: Neck  Pain Orientation: Right    Objective     Treatments:  Therapeutic Exercise  Therapeutic Exercise Performed: Yes  Therapeutic Exercise Activity 1: cervical rotation and lateral flexion x20  Therapeutic Exercise Activity 2: shoulder rolls x30 fwd and bwd  Therapeutic Exercise Activity 3: shoulder flexion with wand @ 40 degree angle x20; x10 standing  Therapeutic Exercise Activity 7: scapular retraction grn x20  Therapeutic Exercise Activity 8: B Shoulder Extension grn x20  Therapeutic Exercise Activity 10: UBE lvl 1 1.5' fwd/bwd seat #1  Therapeutic Exercise Activity 11: bicep curls 3# x30  Therapeutic Exercise Activity 12: shoulder abd yellow 2x5  Therapeutic Exercise Activity 13: prone shoulder horizontal abd x12 BUE  Therapeutic Exercise Activity 14: standing lower trap setting x10  Therapeutic Exercise Activity 15: shoulder ER with cane x10 R only    Manual Therapy  Manual Therapy Performed: Yes  Manual  Therapy Activity 4: Trigger point release to Bilat ronen    EDUCATION:   Individual(s) Educated: Patient  Education Provided: yes  Handout(s) Provided: Scanned into chart  Home Program: reviewed home exercise program  Risk and Benefits Discussed with Patient/Caregiver/Other: Yes   Patient/Caregiver Demonstrated Understanding: Yes   Patient Response to Education: Patient/Caregiver verbalized understanding of information and Patient/Caregiver performed return demonstration of exercises/activities    Assessment: Patient demonstrated good tolerance to progression of exercises without complaints of pain. Patient demonstrated good ability to  weights and exercise bands to perform resisted exercises. Patient appeared to have decreased right shoulder external rotation compared to her left shoulder.  PT Assessment  PT Assessment Results: Decreased strength, Decreased range of motion, Impaired balance, Decreased mobility, Impaired sensation, Orthopedic restrictions, Pain  Rehab Prognosis: Good  Evaluation/Treatment Tolerance: Patient tolerated treatment well    Plan: Continue with POC. Progress exercises as tolerated.  OP PT Plan  Treatment/Interventions: Education/ Instruction, Manual therapy, Neuromuscular re-education, Therapeutic activities, Therapeutic exercises  PT Plan: Skilled PT  PT Frequency: 1 time per week  Duration: 4 weeks  Onset Date: 06/03/24  Certification Period Start Date: 06/10/24  Certification Period End Date: 09/24/24  Number of Treatments Authorized: 9 of 12  Rehab Potential: Good  Plan of Care Agreement: Patient    Goals:  Active       PT Problem       Patient will achieve bilateral cervical side bending ROM at least 30 degrees (Progressing)       Start:  06/10/24    Expected End:  09/24/24            Patient will achieve bilateral cervical rotation ROM at least 60 degrees (Progressing)       Start:  06/10/24    Expected End:  09/24/24            Patient will achieve cervical flexion ROM at least  50 degrees (Progressing)       Start:  06/10/24    Expected End:  09/24/24            Patient will achieve cervical extension ROM at least 40 degrees (Progressing)       Start:  06/10/24    Expected End:  09/24/24            Patient will achieve bilateral shoulder flexion strength of at least 4+/5 (Not Progressing)       Start:  06/10/24    Expected End:  09/24/24            Patient will achieve bilateral shoulder abduction strength of at least 4+/5 (Not Progressing)       Start:  06/10/24    Expected End:  09/24/24            Patient will achieve bilateral shoulder external rotation strength of at least 4+/5 in neutral (Progressing)       Start:  06/10/24    Expected End:  09/24/24            Patient will achieve bilateral shoulder internal rotation strength of at least 4+/5 in neutral (Met)       Start:  06/10/24    Expected End:  07/15/24    Resolved:  07/17/24         Patient will demonstrate independence in home program for support of progression (Met)       Start:  06/10/24    Expected End:  06/24/24    Resolved:  07/17/24         Patient will report pain of no more than 2/10 demonstrating a reduction of overall pain (Not Progressing)       Start:  06/10/24    Expected End:  09/24/24            Patient will show a significant change in NDI (62% to 52%) patient reported outcome tool to demonstrate subjective imporovement (Met)       Start:  06/10/24    Expected End:  07/15/24    Resolved:  07/17/24         PATIENT WILL SHOW A SIGNIFICANT CHANGE IN NDI (46% to 36%) PATIENT REPORTED OUTCOME TOOL TO DEMONSTRATE SUBJECTIVE IMPROVEMENT (Not Progressing)       Start:  07/17/24    Expected End:  09/24/24                PATIENT WILL ACHIEVE bilateral  STRENGTH OF at least 45 lbs IN THE three position       Start:  08/27/24    Expected End:  09/24/24                     Eduard Rausch, PT

## 2024-09-10 ENCOUNTER — TREATMENT (OUTPATIENT)
Dept: PHYSICAL THERAPY | Facility: HOSPITAL | Age: 54
End: 2024-09-10
Payer: MEDICAID

## 2024-09-10 DIAGNOSIS — M47.12 OTHER SPONDYLOSIS WITH MYELOPATHY, CERVICAL REGION: Primary | ICD-10-CM

## 2024-09-10 PROCEDURE — 97140 MANUAL THERAPY 1/> REGIONS: CPT | Mod: GP | Performed by: PHYSICAL THERAPIST

## 2024-09-10 PROCEDURE — 97110 THERAPEUTIC EXERCISES: CPT | Mod: GP | Performed by: PHYSICAL THERAPIST

## 2024-09-10 ASSESSMENT — PAIN SCALES - GENERAL: PAINLEVEL_OUTOF10: 6

## 2024-09-10 ASSESSMENT — PAIN - FUNCTIONAL ASSESSMENT: PAIN_FUNCTIONAL_ASSESSMENT: 0-10

## 2024-09-10 NOTE — PROGRESS NOTES
Physical Therapy Treatment    Patient Name: Eva Roca  MRN: [id number]  Today's Date: 9/10/2024  Time Calculation  Start Time: 1347  Stop Time: 1428  Time Calculation (min): 41 min        PT Therapeutic Procedures Time Entry  Manual Therapy Time Entry: 10  Therapeutic Exercise Time Entry: 31     Current Problem  1. Other spondylosis with myelopathy, cervical region  Follow Up In Physical Therapy        Problem List Items Addressed This Visit             ICD-10-CM    Other spondylosis with myelopathy, cervical region - Primary M47.12       General  Reason for Referral: neck pain  Referred By: Kings LAYNE  Preferred Learning Style: visual, kinesthetic    Subjective   Current Condition:   Same  Patient reports some neck stiffness today.     Performing HEP?: Yes    Precautions  Precautions  Post-Surgical Precautions: Spinal precautions  Precautions Comment: 10# lifting restriction  Pain  Pain Assessment: 0-10  0-10 (Numeric) Pain Score: 6  Pain Location: Neck  Pain Orientation: Right    Objective     Treatments:  Therapeutic Exercise  Therapeutic Exercise Performed: Yes  Therapeutic Exercise Activity 3: shoulder flexion x20 standing  Therapeutic Exercise Activity 6: shoulder ER with scap retraction yellow x10  Therapeutic Exercise Activity 7: scapular retraction grn x30  Therapeutic Exercise Activity 8: B Shoulder Extension grn x20  Therapeutic Exercise Activity 10: UBE lvl 1 2' fwd/bwd seat #2  Therapeutic Exercise Activity 11: bicep curls 4# x30  Therapeutic Exercise Activity 13: prone shoulder horizontal abd x20 BUE    Manual Therapy  Manual Therapy Performed: Yes  Manual Therapy Activity 3: IASTM upper traps and levator scap  Manual Therapy Activity 4: Trigger point release to Bilat traps    EDUCATION:   Individual(s) Educated: Patient  Education Provided: yes  Handout(s) Provided: Scanned into chart  Home Program: reviewed home exercise program   Risk and Benefits Discussed with Patient/Caregiver/Other: Yes    Patient/Caregiver Demonstrated Understanding: Yes   Patient Response to Education: Patient/Caregiver verbalized understanding of information and Patient/Caregiver performed return demonstration of exercises/activities    Assessment: Patient demonstrated good tolerance to progression of resistive exercises without complaints of pain. Patient appeared to have decreased muscle tension post manual techniques.  PT Assessment  PT Assessment Results: Decreased strength, Decreased range of motion, Impaired balance, Decreased mobility, Impaired sensation, Orthopedic restrictions, Pain  Rehab Prognosis: Good  Evaluation/Treatment Tolerance: Patient tolerated treatment well    Plan: Continue with POC. Progress exercises as tolerated.  OP PT Plan  Treatment/Interventions: Education/ Instruction, Manual therapy, Neuromuscular re-education, Therapeutic activities, Therapeutic exercises  PT Plan: Skilled PT  PT Frequency: 1 time per week  Duration: 4 weeks  Onset Date: 06/03/24  Certification Period Start Date: 06/10/24  Certification Period End Date: 09/24/24  Number of Treatments Authorized: 10 of 12  Rehab Potential: Good  Plan of Care Agreement: Patient    Goals:  Active       PT Problem       Patient will achieve bilateral cervical side bending ROM at least 30 degrees (Progressing)       Start:  06/10/24    Expected End:  09/24/24            Patient will achieve bilateral cervical rotation ROM at least 60 degrees (Progressing)       Start:  06/10/24    Expected End:  09/24/24            Patient will achieve cervical flexion ROM at least 50 degrees (Progressing)       Start:  06/10/24    Expected End:  09/24/24            Patient will achieve cervical extension ROM at least 40 degrees (Progressing)       Start:  06/10/24    Expected End:  09/24/24            Patient will achieve bilateral shoulder flexion strength of at least 4+/5 (Not Progressing)       Start:  06/10/24    Expected End:  09/24/24            Patient will  achieve bilateral shoulder abduction strength of at least 4+/5 (Not Progressing)       Start:  06/10/24    Expected End:  09/24/24            Patient will achieve bilateral shoulder external rotation strength of at least 4+/5 in neutral (Progressing)       Start:  06/10/24    Expected End:  09/24/24            Patient will achieve bilateral shoulder internal rotation strength of at least 4+/5 in neutral (Met)       Start:  06/10/24    Expected End:  07/15/24    Resolved:  07/17/24         Patient will demonstrate independence in home program for support of progression (Met)       Start:  06/10/24    Expected End:  06/24/24    Resolved:  07/17/24         Patient will report pain of no more than 2/10 demonstrating a reduction of overall pain (Not Progressing)       Start:  06/10/24    Expected End:  09/24/24            Patient will show a significant change in NDI (62% to 52%) patient reported outcome tool to demonstrate subjective imporovement (Met)       Start:  06/10/24    Expected End:  07/15/24    Resolved:  07/17/24         PATIENT WILL SHOW A SIGNIFICANT CHANGE IN NDI (46% to 36%) PATIENT REPORTED OUTCOME TOOL TO DEMONSTRATE SUBJECTIVE IMPROVEMENT (Not Progressing)       Start:  07/17/24    Expected End:  09/24/24                PATIENT WILL ACHIEVE bilateral  STRENGTH OF at least 45 lbs IN THE three position       Start:  08/27/24    Expected End:  09/24/24                     Eduard Rausch, PT

## 2024-09-17 ENCOUNTER — TREATMENT (OUTPATIENT)
Dept: PHYSICAL THERAPY | Facility: HOSPITAL | Age: 54
End: 2024-09-17
Payer: MEDICAID

## 2024-09-17 DIAGNOSIS — M47.12 OTHER SPONDYLOSIS WITH MYELOPATHY, CERVICAL REGION: Primary | ICD-10-CM

## 2024-09-17 PROCEDURE — 97110 THERAPEUTIC EXERCISES: CPT | Mod: GP | Performed by: PHYSICAL THERAPIST

## 2024-09-17 ASSESSMENT — PAIN SCALES - GENERAL: PAINLEVEL_OUTOF10: 5 - MODERATE PAIN

## 2024-09-17 ASSESSMENT — PAIN - FUNCTIONAL ASSESSMENT: PAIN_FUNCTIONAL_ASSESSMENT: 0-10

## 2024-09-17 NOTE — PROGRESS NOTES
Physical Therapy Treatment    Patient Name: Eva Roca  MRN: [id number]  Today's Date: 9/17/2024  Time Calculation  Start Time: 1357 (arrived late)  Stop Time: 1428  Time Calculation (min): 31 min        PT Therapeutic Procedures Time Entry  Therapeutic Exercise Time Entry: 31        Current Problem  1. Other spondylosis with myelopathy, cervical region  Follow Up In Physical Therapy        Problem List Items Addressed This Visit             ICD-10-CM    Other spondylosis with myelopathy, cervical region - Primary M47.12       General  Reason for Referral: neck pain  Referred By: Kings LAYNE  Preferred Learning Style: visual, kinesthetic    Subjective   Current Condition:   Better  Patient reports continued neck pain and numbness.    Performing HEP?: Yes    Precautions  Precautions  Post-Surgical Precautions: Spinal precautions  Precautions Comment: 10# lifting restriction  Pain  Pain Assessment: 0-10  0-10 (Numeric) Pain Score: 5 - Moderate pain  Pain Location: Neck  Pain Orientation: Right    Objective     Treatments:  Therapeutic Exercise  Therapeutic Exercise Performed: Yes  Therapeutic Exercise Activity 3: shoulder flexion x20 standing  Therapeutic Exercise Activity 6: shoulder ER with scap retraction yellow x10  Therapeutic Exercise Activity 7: scapular retraction blue x30  Therapeutic Exercise Activity 8: B Shoulder Extension grn x20  Therapeutic Exercise Activity 10: UBE lvl 1 2' fwd/bwd seat #2  Therapeutic Exercise Activity 15: median nerve flossing  Therapeutic Exercise Activity 16: cat/cow x10  Therapeutic Exercise Activity 17: thread the needle in quadraped x5  Therapeutic Exercise Activity 18: wall slides x10         EDUCATION:   Individual(s) Educated: Patient  Education Provided: yes  Handout(s) Provided: Scanned into chart  Home Program: reviewed home exercise program, added median nerve glides and quadraped thread the needle  Risk and Benefits Discussed with Patient/Caregiver/Other: Yes    Patient/Caregiver Demonstrated Understanding: Yes   Patient Response to Education: Patient/Caregiver verbalized understanding of information and Patient/Caregiver performed return demonstration of exercises/activities    Assessment: Patient demonstrated good tolerance to exercises without increased pain. Patient demonstrated a tendency for lumbar extension with shoulder flexion. Patient demonstrated good ability to perform cat/cow with more difficulty performing lumbar flexion (cat). Patient is progressing towards her physical therapy goals.  PT Assessment  PT Assessment Results: Decreased strength, Decreased range of motion, Impaired balance, Decreased mobility, Impaired sensation, Orthopedic restrictions, Pain  Rehab Prognosis: Good  Evaluation/Treatment Tolerance: Patient tolerated treatment well    Plan: Continue with POC. Progress exercises as tolerated.  OP PT Plan  Treatment/Interventions: Education/ Instruction, Manual therapy, Neuromuscular re-education, Therapeutic activities, Therapeutic exercises  PT Plan: Skilled PT  PT Frequency: 1 time per week  Duration: 4 weeks  Onset Date: 06/03/24  Certification Period Start Date: 06/10/24  Certification Period End Date: 09/24/24  Number of Treatments Authorized: 11 of 12  Rehab Potential: Good  Plan of Care Agreement: Patient    Goals:  Active       PT Problem       Patient will achieve bilateral cervical side bending ROM at least 30 degrees (Progressing)       Start:  06/10/24    Expected End:  09/24/24            Patient will achieve bilateral cervical rotation ROM at least 60 degrees (Progressing)       Start:  06/10/24    Expected End:  09/24/24            Patient will achieve cervical flexion ROM at least 50 degrees (Progressing)       Start:  06/10/24    Expected End:  09/24/24            Patient will achieve cervical extension ROM at least 40 degrees (Progressing)       Start:  06/10/24    Expected End:  09/24/24            Patient will achieve bilateral  shoulder flexion strength of at least 4+/5 (Not Progressing)       Start:  06/10/24    Expected End:  09/24/24            Patient will achieve bilateral shoulder abduction strength of at least 4+/5 (Not Progressing)       Start:  06/10/24    Expected End:  09/24/24            Patient will achieve bilateral shoulder external rotation strength of at least 4+/5 in neutral (Progressing)       Start:  06/10/24    Expected End:  09/24/24            Patient will achieve bilateral shoulder internal rotation strength of at least 4+/5 in neutral (Met)       Start:  06/10/24    Expected End:  07/15/24    Resolved:  07/17/24         Patient will demonstrate independence in home program for support of progression (Met)       Start:  06/10/24    Expected End:  06/24/24    Resolved:  07/17/24         Patient will report pain of no more than 2/10 demonstrating a reduction of overall pain (Not Progressing)       Start:  06/10/24    Expected End:  09/24/24            Patient will show a significant change in NDI (62% to 52%) patient reported outcome tool to demonstrate subjective imporovement (Met)       Start:  06/10/24    Expected End:  07/15/24    Resolved:  07/17/24         PATIENT WILL SHOW A SIGNIFICANT CHANGE IN NDI (46% to 36%) PATIENT REPORTED OUTCOME TOOL TO DEMONSTRATE SUBJECTIVE IMPROVEMENT (Not Progressing)       Start:  07/17/24    Expected End:  09/24/24                PATIENT WILL ACHIEVE bilateral  STRENGTH OF at least 45 lbs IN THE three position       Start:  08/27/24    Expected End:  09/24/24                     Eduard Rausch, PT

## 2024-09-24 ENCOUNTER — TREATMENT (OUTPATIENT)
Dept: PHYSICAL THERAPY | Facility: HOSPITAL | Age: 54
End: 2024-09-24
Payer: MEDICAID

## 2024-09-24 DIAGNOSIS — M47.12 OTHER SPONDYLOSIS WITH MYELOPATHY, CERVICAL REGION: Primary | ICD-10-CM

## 2024-09-24 PROCEDURE — 97110 THERAPEUTIC EXERCISES: CPT | Mod: GP | Performed by: PHYSICAL THERAPIST

## 2024-09-24 ASSESSMENT — PAIN SCALES - GENERAL: PAINLEVEL_OUTOF10: 5 - MODERATE PAIN

## 2024-09-24 ASSESSMENT — PAIN - FUNCTIONAL ASSESSMENT: PAIN_FUNCTIONAL_ASSESSMENT: 0-10

## 2024-09-24 NOTE — PROGRESS NOTES
Physical Therapy Re-Evaluation and Treatment    Patient Name: Eva Roca  MRN: [id number]  Today's Date: 9/24/2024  Time Calculation  Start Time: 1347  Stop Time: 1426  Time Calculation (min): 39 min        PT Therapeutic Procedures Time Entry  Therapeutic Exercise Time Entry: 39        Current Problem  1. Other spondylosis with myelopathy, cervical region  Follow Up In Physical Therapy        Problem List Items Addressed This Visit             ICD-10-CM    Other spondylosis with myelopathy, cervical region - Primary M47.12       General  Reason for Referral: neck pain  Referred By: Kings LAYNE  Preferred Learning Style: visual, kinesthetic    Subjective   Current Condition:   Better  Patient reports feeling stronger.    Performing HEP?: Yes    Precautions  Precautions  Post-Surgical Precautions: Spinal precautions  Precautions Comment: 10# lifting restriction  Pain  Pain Assessment: 0-10  0-10 (Numeric) Pain Score: 5 - Moderate pain  Pain Location: Neck  Pain Orientation: Right    Objective     Cervical AROM  Cervical flexion: (80°): 43  Cervical extension: (50°): 38  Cervical rotation right: (80°): 45  Cervical rotation left: (80°): 48  Cervical sidebend right: (45°): 21  Cervical sidebend left: (45°): 17    Shoulder Strength  R shoulder flexion: (5/5): 4+/5  L shoulder flexion: (5/5): 4+/5  R shoulder abduction: (5/5): 4/5  L shoulder abduction: (5/5): 4/5  R shoulder ER: (5/5): 4/5  L shoulder ER: (5/5): 4/5  R shoulder IR: (5/5) : 4+/5  L shoulder IR: (5/5): 4+/5    Outcome Measures:  Other Measures  Neck Disability Index: 54%    Treatments:  Therapeutic Exercise  Therapeutic Exercise Performed: Yes  Therapeutic Exercise Activity 10: UBE lvl 1 2' fwd/bwd seat #2  Therapeutic Exercise Activity 14: cervical spine iso side flexion, flex, and rotation x5 each  Therapeutic Exercise Activity 16: cat/cow x10  Therapeutic Exercise Activity 17: thread the needle in quadraped x10         EDUCATION:   Individual(s)  Educated: Patient  Education Provided: yes  Handout(s) Provided: Scanned into chart  Home Program: Issued cervical spine isometrics for flexion, side-flexion, and rotation  Risk and Benefits Discussed with Patient/Caregiver/Other: Yes   Patient/Caregiver Demonstrated Understanding: Yes   Patient Response to Education: Patient/Caregiver verbalized understanding of information and Patient/Caregiver performed return demonstration of exercises/activities    Assessment: Patient demonstrated good tolerance to exercises without complaints of increased pain. Patient is progressing slowly towards her physical therapy goals. Patient continues to have impaired sensation in her bilateral upper extremities especially her right upper extremity. Skilled physical therapy is warranted to address the above stated impairments, so the patient can perform functional activities and work duties without increased pain or difficulty.   PT Assessment  PT Assessment Results: Decreased strength, Decreased range of motion, Impaired balance, Decreased mobility, Impaired sensation, Orthopedic restrictions, Pain  Rehab Prognosis: Good  Evaluation/Treatment Tolerance: Patient tolerated treatment well    Plan: Continue with POC. Progress exercises as tolerated.  OP PT Plan  Treatment/Interventions: Education/ Instruction, Manual therapy, Neuromuscular re-education, Therapeutic activities, Therapeutic exercises  PT Plan: Skilled PT  PT Frequency: 1 time per week  Duration: 4 weeks  Onset Date: 06/03/24  Certification Period Start Date: 06/10/24  Certification Period End Date: 10/22/24  Number of Treatments Authorized: 12 of 16  Rehab Potential: Good  Plan of Care Agreement: Patient    Goals:  Active       PT Problem       Patient will achieve bilateral cervical side bending ROM at least 30 degrees (Progressing)       Start:  06/10/24    Expected End:  10/22/24            Patient will achieve bilateral cervical rotation ROM at least 60 degrees  (Progressing)       Start:  06/10/24    Expected End:  10/22/24            Patient will achieve cervical flexion ROM at least 50 degrees (Progressing)       Start:  06/10/24    Expected End:  10/22/24            Patient will achieve cervical extension ROM at least 40 degrees (Not Progressing)       Start:  06/10/24    Expected End:  10/22/24            Patient will achieve bilateral shoulder flexion strength of at least 4+/5 (Met)       Start:  06/10/24    Expected End:  09/24/24    Resolved:  09/24/24         Patient will achieve bilateral shoulder abduction strength of at least 4+/5 (Progressing)       Start:  06/10/24    Expected End:  10/22/24            Patient will achieve bilateral shoulder external rotation strength of at least 4+/5 in neutral (Progressing)       Start:  06/10/24    Expected End:  10/22/24            Patient will achieve bilateral shoulder internal rotation strength of at least 4+/5 in neutral (Met)       Start:  06/10/24    Expected End:  07/15/24    Resolved:  07/17/24         Patient will demonstrate independence in home program for support of progression (Met)       Start:  06/10/24    Expected End:  06/24/24    Resolved:  07/17/24         Patient will report pain of no more than 2/10 demonstrating a reduction of overall pain (Not Progressing)       Start:  06/10/24    Expected End:  10/22/24            Patient will show a significant change in NDI (62% to 52%) patient reported outcome tool to demonstrate subjective imporovement (Met)       Start:  06/10/24    Expected End:  07/15/24    Resolved:  07/17/24         PATIENT WILL SHOW A SIGNIFICANT CHANGE IN NDI (46% to 36%) PATIENT REPORTED OUTCOME TOOL TO DEMONSTRATE SUBJECTIVE IMPROVEMENT (Not Progressing)       Start:  07/17/24    Expected End:  10/22/24                PATIENT WILL ACHIEVE bilateral  STRENGTH OF at least 45 lbs IN THE three position (Progressing)       Start:  08/27/24    Expected End:  10/22/24                      Eduard Rausch, PT

## 2024-09-24 NOTE — LETTER
September 24, 2024    ROVERTO Recio    Patient: Eva Roca   YOB: 1970   Date of Visit: 9/24/2024       Dear ROVERTO Recio  No address on file    The attached plan of care is being sent to you because your patient’s medical reimbursement requires that you certify the plan of care. Your signature is required to allow uninterrupted insurance coverage.      You may indicate your approval by signing below and faxing this form back to us at Dept Fax: [phone].    Please call Dept: [phone] with any questions or concerns.    Thank you for this referral,        Eduard Rausch PT  [place] [id number] [facility] [id number] [phone]    Payer: Payor: [employer] / Plan: [employer] / Product Type: *No Product type* /                                                                         Date:     Dear Eduard Rausch PT,     Re: Ms. Eva Roca, MRN:[id number]    I certify that I have reviewed the attached plan of care and it is medically necessary for Ms. Eva Roca (1970) who is under my care.          ______________________________________                    _________________  Provider name and credentials                                           Date and time                                                                                           Plan of Care 6/10/24   Effective from: 6/10/2024  Effective to: 10/22/2024    Plan ID: [id number]            Participants as of Finalize on 9/24/2024    Name Type Comments Contact Info    ROVERTO Recio Referring Provider      Eduard Rausch PT Physical Therapist  [phone]       Last Plan Note     Author: Eduard Rausch PT Status: Sign when Signing Visit Last edited: 9/24/2024  1:45 PM         Physical Therapy Re-Evaluation and Treatment    Patient Name: Eva Roca  MRN: [id number]  Today's Date: 9/24/2024  Time Calculation  Start Time:  1347  Stop Time: 1426  Time Calculation (min): 39 min        PT Therapeutic Procedures Time Entry  Therapeutic Exercise Time Entry: 39        Current Problem  1. Other spondylosis with myelopathy, cervical region  Follow Up In Physical Therapy        Problem List Items Addressed This Visit             ICD-10-CM    Other spondylosis with myelopathy, cervical region - Primary M47.12       General  Reason for Referral: neck pain  Referred By: Kings LAYNE  Preferred Learning Style: visual, kinesthetic    Subjective   Current Condition:   Better  Patient reports feeling stronger.    Performing HEP?: Yes    Precautions  Precautions  Post-Surgical Precautions: Spinal precautions  Precautions Comment: 10# lifting restriction  Pain  Pain Assessment: 0-10  0-10 (Numeric) Pain Score: 5 - Moderate pain  Pain Location: Neck  Pain Orientation: Right    Objective     Cervical AROM  Cervical flexion: (80°): 43  Cervical extension: (50°): 38  Cervical rotation right: (80°): 45  Cervical rotation left: (80°): 48  Cervical sidebend right: (45°): 21  Cervical sidebend left: (45°): 17    Shoulder Strength  R shoulder flexion: (5/5): 4+/5  L shoulder flexion: (5/5): 4+/5  R shoulder abduction: (5/5): 4/5  L shoulder abduction: (5/5): 4/5  R shoulder ER: (5/5): 4/5  L shoulder ER: (5/5): 4/5  R shoulder IR: (5/5) : 4+/5  L shoulder IR: (5/5): 4+/5    Outcome Measures:  Other Measures  Neck Disability Index: 54%    Treatments:  Therapeutic Exercise  Therapeutic Exercise Performed: Yes  Therapeutic Exercise Activity 10: UBE lvl 1 2' fwd/bwd seat #2  Therapeutic Exercise Activity 14: cervical spine iso side flexion, flex, and rotation x5 each  Therapeutic Exercise Activity 16: cat/cow x10  Therapeutic Exercise Activity 17: thread the needle in quadraped x10         EDUCATION:   Individual(s) Educated: Patient  Education Provided: yes  Handout(s) Provided: Scanned into chart  Home Program: Issued cervical spine isometrics for flexion,  side-flexion, and rotation  Risk and Benefits Discussed with Patient/Caregiver/Other: Yes   Patient/Caregiver Demonstrated Understanding: Yes   Patient Response to Education: Patient/Caregiver verbalized understanding of information and Patient/Caregiver performed return demonstration of exercises/activities    Assessment: Patient demonstrated good tolerance to exercises without complaints of increased pain. Patient is progressing slowly towards her physical therapy goals. Patient continues to have impaired sensation in her bilateral upper extremities especially her right upper extremity. Skilled physical therapy is warranted to address the above stated impairments, so the patient can perform functional activities and work duties without increased pain or difficulty.   PT Assessment  PT Assessment Results: Decreased strength, Decreased range of motion, Impaired balance, Decreased mobility, Impaired sensation, Orthopedic restrictions, Pain  Rehab Prognosis: Good  Evaluation/Treatment Tolerance: Patient tolerated treatment well    Plan: Continue with POC. Progress exercises as tolerated.  OP PT Plan  Treatment/Interventions: Education/ Instruction, Manual therapy, Neuromuscular re-education, Therapeutic activities, Therapeutic exercises  PT Plan: Skilled PT  PT Frequency: 1 time per week  Duration: 4 weeks  Onset Date: 06/03/24  Certification Period Start Date: 06/10/24  Certification Period End Date: 10/22/24  Number of Treatments Authorized: 12 of 16  Rehab Potential: Good  Plan of Care Agreement: Patient    Goals:  Active       PT Problem       Patient will achieve bilateral cervical side bending ROM at least 30 degrees (Progressing)       Start:  06/10/24    Expected End:  10/22/24            Patient will achieve bilateral cervical rotation ROM at least 60 degrees (Progressing)       Start:  06/10/24    Expected End:  10/22/24            Patient will achieve cervical flexion ROM at least 50 degrees (Progressing)        Start:  06/10/24    Expected End:  10/22/24            Patient will achieve cervical extension ROM at least 40 degrees (Not Progressing)       Start:  06/10/24    Expected End:  10/22/24            Patient will achieve bilateral shoulder flexion strength of at least 4+/5 (Met)       Start:  06/10/24    Expected End:  09/24/24    Resolved:  09/24/24         Patient will achieve bilateral shoulder abduction strength of at least 4+/5 (Progressing)       Start:  06/10/24    Expected End:  10/22/24            Patient will achieve bilateral shoulder external rotation strength of at least 4+/5 in neutral (Progressing)       Start:  06/10/24    Expected End:  10/22/24            Patient will achieve bilateral shoulder internal rotation strength of at least 4+/5 in neutral (Met)       Start:  06/10/24    Expected End:  07/15/24    Resolved:  07/17/24         Patient will demonstrate independence in home program for support of progression (Met)       Start:  06/10/24    Expected End:  06/24/24    Resolved:  07/17/24         Patient will report pain of no more than 2/10 demonstrating a reduction of overall pain (Not Progressing)       Start:  06/10/24    Expected End:  10/22/24            Patient will show a significant change in NDI (62% to 52%) patient reported outcome tool to demonstrate subjective imporovement (Met)       Start:  06/10/24    Expected End:  07/15/24    Resolved:  07/17/24         PATIENT WILL SHOW A SIGNIFICANT CHANGE IN NDI (46% to 36%) PATIENT REPORTED OUTCOME TOOL TO DEMONSTRATE SUBJECTIVE IMPROVEMENT (Not Progressing)       Start:  07/17/24    Expected End:  10/22/24                PATIENT WILL ACHIEVE bilateral  STRENGTH OF at least 45 lbs IN THE three position (Progressing)       Start:  08/27/24    Expected End:  10/22/24                     Eduard Rausch PT           Current Participants as of 9/24/2024    Name Type Comments Contact Info    ROVERTO Recio Referring Provider       Signature pending    Eduard Rausch, PT Physical Therapist  [phone]

## 2024-10-01 ENCOUNTER — TREATMENT (OUTPATIENT)
Dept: PHYSICAL THERAPY | Facility: HOSPITAL | Age: 54
End: 2024-10-01
Payer: MEDICAID

## 2024-10-01 DIAGNOSIS — M47.12 CERVICAL SPONDYLOSIS WITH MYELOPATHY: Primary | ICD-10-CM

## 2024-10-01 DIAGNOSIS — M47.12 OTHER SPONDYLOSIS WITH MYELOPATHY, CERVICAL REGION: ICD-10-CM

## 2024-10-01 PROCEDURE — 97110 THERAPEUTIC EXERCISES: CPT | Mod: GP | Performed by: PHYSICAL THERAPIST

## 2024-10-01 PROCEDURE — 97140 MANUAL THERAPY 1/> REGIONS: CPT | Mod: GP | Performed by: PHYSICAL THERAPIST

## 2024-10-01 ASSESSMENT — PAIN SCALES - GENERAL: PAINLEVEL_OUTOF10: 5 - MODERATE PAIN

## 2024-10-01 ASSESSMENT — PAIN DESCRIPTION - DESCRIPTORS: DESCRIPTORS: TIGHTNESS

## 2024-10-01 ASSESSMENT — PAIN - FUNCTIONAL ASSESSMENT: PAIN_FUNCTIONAL_ASSESSMENT: 0-10

## 2024-10-01 NOTE — PROGRESS NOTES
Physical Therapy Treatment    Patient Name: Eva Roca  MRN: [id number]  Today's Date: 10/1/2024  Time Calculation  Start Time: 1349  Stop Time: 1427  Time Calculation (min): 38 min        PT Therapeutic Procedures Time Entry  Manual Therapy Time Entry: 5  Therapeutic Exercise Time Entry: 33                Current Problem  1. Cervical spondylosis with myelopathy        2. Other spondylosis with myelopathy, cervical region  Follow Up In Physical Therapy        Problem List Items Addressed This Visit             ICD-10-CM    Other spondylosis with myelopathy, cervical region M47.12    Cervical spondylosis with myelopathy - Primary M47.12       General  Reason for Referral: neck pain  Referred By: Kings LAYNE  Preferred Learning Style: visual, kinesthetic    Subjective   Current Condition:   Same  Patient reports tightness in her right arm down to her hand. Patient states that her right upper extremity feels heavier than her left.    Performing HEP?: Yes    Precautions  Precautions  Post-Surgical Precautions: Spinal precautions  Precautions Comment: 10# lifting restriction  Pain  Pain Assessment: 0-10  0-10 (Numeric) Pain Score: 5 - Moderate pain  Pain Location: Neck  Pain Orientation: Right  Pain Radiating Towards: to right hand  Pain Descriptors: Tightness    Objective     Treatments:  Therapeutic Exercise  Therapeutic Exercise Performed: Yes  Therapeutic Exercise Activity 1: wall angels x10  Therapeutic Exercise Activity 6: shoulder ER with scap retraction yellow x10  Therapeutic Exercise Activity 7: scapular retraction blue x30  Therapeutic Exercise Activity 8: B Shoulder Extension grn x20  Therapeutic Exercise Activity 9: scap protraction supine x10  Therapeutic Exercise Activity 10: UBE lvl 1 2' fwd/bwd seat #2  Therapeutic Exercise Activity 14: cervical spine iso side flexion, flex, and rotation x10 each  Therapeutic Exercise Activity 16: cat/cow x15 (no cow, only cat)  Therapeutic Exercise Activity 17:  thread the needle in quadraped x10  Therapeutic Exercise Activity 18: wall slides x10    EDUCATION:   Individual(s) Educated: Patient  Education Provided: yes  Handout(s) Provided: Scanned into chart  Home Program: reviewed home exercise program   Risk and Benefits Discussed with Patient/Caregiver/Other: Yes   Patient/Caregiver Demonstrated Understanding: Yes   Patient Response to Education: Patient/Caregiver verbalized understanding of information and Patient/Caregiver performed return demonstration of exercises/activities    Assessment: Patient demonstrated good tolerance to exercises without complaints of increased pain. Patient continues to have impaired right upper extremity sensation and tightness in her trunk. Patient was encouraged to reach out to her surgeon. Patient verbalized understanding.  PT Assessment  PT Assessment Results: Decreased strength, Decreased range of motion, Impaired balance, Decreased mobility, Impaired sensation, Orthopedic restrictions, Pain  Rehab Prognosis: Good  Evaluation/Treatment Tolerance: Patient tolerated treatment well    Plan: Continue with POC. Progress exercises as tolerated.  OP PT Plan  Treatment/Interventions: Education/ Instruction, Manual therapy, Neuromuscular re-education, Therapeutic activities, Therapeutic exercises  PT Plan: Skilled PT  PT Frequency: 1 time per week  Duration: 4 weeks  Onset Date: 06/03/24  Certification Period Start Date: 06/10/24  Certification Period End Date: 10/22/24  Number of Treatments Authorized: 13 of 16  Rehab Potential: Good  Plan of Care Agreement: Patient    Goals:  Active       PT Problem       Patient will achieve bilateral cervical side bending ROM at least 30 degrees (Progressing)       Start:  06/10/24    Expected End:  10/22/24            Patient will achieve bilateral cervical rotation ROM at least 60 degrees (Progressing)       Start:  06/10/24    Expected End:  10/22/24            Patient will achieve cervical flexion ROM  at least 50 degrees (Progressing)       Start:  06/10/24    Expected End:  10/22/24            Patient will achieve cervical extension ROM at least 40 degrees (Not Progressing)       Start:  06/10/24    Expected End:  10/22/24            Patient will achieve bilateral shoulder flexion strength of at least 4+/5 (Met)       Start:  06/10/24    Expected End:  09/24/24    Resolved:  09/24/24         Patient will achieve bilateral shoulder abduction strength of at least 4+/5 (Progressing)       Start:  06/10/24    Expected End:  10/22/24            Patient will achieve bilateral shoulder external rotation strength of at least 4+/5 in neutral (Progressing)       Start:  06/10/24    Expected End:  10/22/24            Patient will achieve bilateral shoulder internal rotation strength of at least 4+/5 in neutral (Met)       Start:  06/10/24    Expected End:  07/15/24    Resolved:  07/17/24         Patient will demonstrate independence in home program for support of progression (Met)       Start:  06/10/24    Expected End:  06/24/24    Resolved:  07/17/24         Patient will report pain of no more than 2/10 demonstrating a reduction of overall pain (Not Progressing)       Start:  06/10/24    Expected End:  10/22/24            Patient will show a significant change in NDI (62% to 52%) patient reported outcome tool to demonstrate subjective imporovement (Met)       Start:  06/10/24    Expected End:  07/15/24    Resolved:  07/17/24         PATIENT WILL SHOW A SIGNIFICANT CHANGE IN NDI (46% to 36%) PATIENT REPORTED OUTCOME TOOL TO DEMONSTRATE SUBJECTIVE IMPROVEMENT (Not Progressing)       Start:  07/17/24    Expected End:  10/22/24                PATIENT WILL ACHIEVE bilateral  STRENGTH OF at least 45 lbs IN THE three position (Progressing)       Start:  08/27/24    Expected End:  10/22/24                     Eduard Rausch, PT

## 2024-10-08 ENCOUNTER — TREATMENT (OUTPATIENT)
Dept: PHYSICAL THERAPY | Facility: HOSPITAL | Age: 54
End: 2024-10-08
Payer: MEDICAID

## 2024-10-08 DIAGNOSIS — M47.12 OTHER SPONDYLOSIS WITH MYELOPATHY, CERVICAL REGION: Primary | ICD-10-CM

## 2024-10-08 PROCEDURE — 97110 THERAPEUTIC EXERCISES: CPT | Mod: GP | Performed by: PHYSICAL THERAPIST

## 2024-10-08 ASSESSMENT — PAIN - FUNCTIONAL ASSESSMENT: PAIN_FUNCTIONAL_ASSESSMENT: 0-10

## 2024-10-08 ASSESSMENT — PAIN SCALES - GENERAL: PAINLEVEL_OUTOF10: 5 - MODERATE PAIN

## 2024-10-08 NOTE — PROGRESS NOTES
Physical Therapy Treatment    Patient Name: Eva Roca  MRN: [id number]  Today's Date: 10/8/2024  Time Calculation  Start Time: 1514  Stop Time: 1554  Time Calculation (min): 40 min        PT Therapeutic Procedures Time Entry  Manual Therapy Time Entry: 5  Therapeutic Exercise Time Entry: 35                Current Problem  1. Other spondylosis with myelopathy, cervical region  Follow Up In Physical Therapy        Problem List Items Addressed This Visit             ICD-10-CM    Other spondylosis with myelopathy, cervical region - Primary M47.12       General  Reason for Referral: neck pain  Referred By: Kings LAYNE  Preferred Learning Style: visual, kinesthetic    Subjective   Current Condition:   Same  Patient reports still having numbness in her hands.    Performing HEP?: Yes    Precautions  Precautions  Post-Surgical Precautions: Spinal precautions  Precautions Comment: 10# lifting restriction  Pain  Pain Assessment: 0-10  0-10 (Numeric) Pain Score: 5 - Moderate pain  Pain Location: Neck  Pain Orientation: Right    Objective     Treatments:  Therapeutic Exercise  Therapeutic Exercise Performed: Yes  Therapeutic Exercise Activity 1: wall angels x10  Therapeutic Exercise Activity 3: shoulder flexion x10 standing back against wall  Therapeutic Exercise Activity 6: shoulder ER with scap retraction yellow x10  Therapeutic Exercise Activity 7: scapular retraction blue x30  Therapeutic Exercise Activity 8: B Shoulder Extension blue x30  Therapeutic Exercise Activity 9: scap protraction supine 1# x10  Therapeutic Exercise Activity 10: UBE lvl 1.5 2' fwd/bwd seat #2  Therapeutic Exercise Activity 11: s/l open book thoracic rotation x10  Therapeutic Exercise Activity 12: h/l TA 2x10  Therapeutic Exercise Activity 16: quadraped alternating UE x10 ea  Therapeutic Exercise Activity 17: thread the needle in quadraped x15  Therapeutic Exercise Activity 18: wall slides x30    Manual Therapy  Manual Therapy Performed:  Yes  Manual Therapy Activity 4: Trigger point release to right paraspinals and traps    EDUCATION:   Individual(s) Educated: Patient  Education Provided: yes  Handout(s) Provided: Scanned into chart  Home Program: reviewed home exercise program   Risk and Benefits Discussed with Patient/Caregiver/Other: Yes   Patient/Caregiver Demonstrated Understanding: Yes   Patient Response to Education: Patient/Caregiver verbalized understanding of information and Patient/Caregiver performed return demonstration of exercises/activities    Assessment: Patient demonstrated good tolerance to exercises without complaints of increased pain. Patient demonstrated difficulty performing wall angels and shoulder flexion with her back against the wall due to upper extremity weakness.   PT Assessment  PT Assessment Results: Decreased strength, Decreased range of motion, Impaired balance, Decreased mobility, Impaired sensation, Orthopedic restrictions, Pain  Rehab Prognosis: Good  Evaluation/Treatment Tolerance: Patient tolerated treatment well    Plan: Continue with POC. Progress exercises as tolerated.  OP PT Plan  Treatment/Interventions: Education/ Instruction, Manual therapy, Neuromuscular re-education, Therapeutic activities, Therapeutic exercises  PT Plan: Skilled PT  PT Frequency: 1 time per week  Duration: 4 weeks  Onset Date: 06/03/24  Certification Period Start Date: 06/10/24  Certification Period End Date: 10/22/24  Number of Treatments Authorized: 14 of 16  Rehab Potential: Good  Plan of Care Agreement: Patient    Goals:  Active       PT Problem       Patient will achieve bilateral cervical side bending ROM at least 30 degrees (Progressing)       Start:  06/10/24    Expected End:  10/22/24            Patient will achieve bilateral cervical rotation ROM at least 60 degrees (Progressing)       Start:  06/10/24    Expected End:  10/22/24            Patient will achieve cervical flexion ROM at least 50 degrees (Progressing)        Start:  06/10/24    Expected End:  10/22/24            Patient will achieve cervical extension ROM at least 40 degrees (Not Progressing)       Start:  06/10/24    Expected End:  10/22/24            Patient will achieve bilateral shoulder flexion strength of at least 4+/5 (Met)       Start:  06/10/24    Expected End:  09/24/24    Resolved:  09/24/24         Patient will achieve bilateral shoulder abduction strength of at least 4+/5 (Progressing)       Start:  06/10/24    Expected End:  10/22/24            Patient will achieve bilateral shoulder external rotation strength of at least 4+/5 in neutral (Progressing)       Start:  06/10/24    Expected End:  10/22/24            Patient will achieve bilateral shoulder internal rotation strength of at least 4+/5 in neutral (Met)       Start:  06/10/24    Expected End:  07/15/24    Resolved:  07/17/24         Patient will demonstrate independence in home program for support of progression (Met)       Start:  06/10/24    Expected End:  06/24/24    Resolved:  07/17/24         Patient will report pain of no more than 2/10 demonstrating a reduction of overall pain (Not Progressing)       Start:  06/10/24    Expected End:  10/22/24            Patient will show a significant change in NDI (62% to 52%) patient reported outcome tool to demonstrate subjective imporovement (Met)       Start:  06/10/24    Expected End:  07/15/24    Resolved:  07/17/24         PATIENT WILL SHOW A SIGNIFICANT CHANGE IN NDI (46% to 36%) PATIENT REPORTED OUTCOME TOOL TO DEMONSTRATE SUBJECTIVE IMPROVEMENT (Not Progressing)       Start:  07/17/24    Expected End:  10/22/24                PATIENT WILL ACHIEVE bilateral  STRENGTH OF at least 45 lbs IN THE three position (Progressing)       Start:  08/27/24    Expected End:  10/22/24                     Eduard Rausch, PT

## 2024-10-15 ENCOUNTER — TREATMENT (OUTPATIENT)
Dept: PHYSICAL THERAPY | Facility: HOSPITAL | Age: 54
End: 2024-10-15
Payer: MEDICAID

## 2024-10-15 DIAGNOSIS — M47.12 OTHER SPONDYLOSIS WITH MYELOPATHY, CERVICAL REGION: Primary | ICD-10-CM

## 2024-10-15 PROCEDURE — 97140 MANUAL THERAPY 1/> REGIONS: CPT | Mod: GP | Performed by: PHYSICAL THERAPIST

## 2024-10-15 PROCEDURE — 97110 THERAPEUTIC EXERCISES: CPT | Mod: GP | Performed by: PHYSICAL THERAPIST

## 2024-10-15 ASSESSMENT — PAIN SCALES - GENERAL: PAINLEVEL_OUTOF10: 6

## 2024-10-15 ASSESSMENT — PAIN - FUNCTIONAL ASSESSMENT: PAIN_FUNCTIONAL_ASSESSMENT: 0-10

## 2024-10-15 NOTE — PROGRESS NOTES
"  Physical Therapy Treatment    Patient Name: Eva Roca  MRN: [id number]  Today's Date: 10/15/2024  Time Calculation  Start Time: 1432  Stop Time: 1515  Time Calculation (min): 43 min        PT Therapeutic Procedures Time Entry  Manual Therapy Time Entry: 8  Therapeutic Exercise Time Entry: 35                Current Problem  1. Other spondylosis with myelopathy, cervical region  Follow Up In Physical Therapy        Problem List Items Addressed This Visit             ICD-10-CM    Other spondylosis with myelopathy, cervical region - Primary M47.12       General  Reason for Referral: neck pain  Referred By: Kings LAYNE  Preferred Learning Style: visual, kinesthetic    Subjective   Current Condition:   Same  Patient reports that she saw her surgeon last week. Patient states that the doctor is pleased with her progress and she has no restrictions.    Performing HEP?: Yes    Precautions  Precautions  Post-Surgical Precautions: Spinal precautions  Pain  Pain Assessment: 0-10  0-10 (Numeric) Pain Score: 6  Pain Location: Neck  Pain Orientation: Right    Objective   Treatments:  Therapeutic Exercise  Therapeutic Exercise Performed: Yes  Therapeutic Exercise Activity 1: wall angels x10  Therapeutic Exercise Activity 3: shoulder flexion x10 standing back against wall  Therapeutic Exercise Activity 4: SCM stretch 3x15\"  Therapeutic Exercise Activity 5: levator scap stretch 3x15\"  Therapeutic Exercise Activity 6: shoulder ER with scap retraction yellow x10  Therapeutic Exercise Activity 7: scapular retraction blue x30  Therapeutic Exercise Activity 8: B Shoulder Extension blue x30  Therapeutic Exercise Activity 9: scap protraction supine 3# 2x10  Therapeutic Exercise Activity 10: UBE lvl 1.5 2' fwd/bwd seat #2  Therapeutic Exercise Activity 11: s/l open book thoracic rotation x10  Therapeutic Exercise Activity 12: h/l TA 2x10  Therapeutic Exercise Activity 16: quadraped alternating UE/LE x10 ea  Therapeutic Exercise Activity " 17: thread the needle in quadraped x10    Manual Therapy  Manual Therapy Performed: Yes  Manual Therapy Activity 3: STM upper traps and levator scap  Manual Therapy Activity 4: Trigger point release to right paraspinals and traps    EDUCATION:   Individual(s) Educated: Patient  Education Provided: yes  Handout(s) Provided: Scanned into chart  Home Program: reviewed home exercise program   Risk and Benefits Discussed with Patient/Caregiver/Other: Yes   Patient/Caregiver Demonstrated Understanding: Yes   Patient Response to Education: Patient/Caregiver verbalized understanding of information, Patient/Caregiver performed return demonstration of exercises/activities, and Patient/Caregiver asked appropriate questions    Assessment: Patient demonstrated good tolerance to PROGRESSIVE RESISTED EXERCISE without complaints of pain. Patient demonstrated increased levator scap muscle tension with STM.   PT Assessment  PT Assessment Results: Decreased strength, Decreased range of motion, Impaired balance, Decreased mobility, Impaired sensation, Orthopedic restrictions, Pain  Rehab Prognosis: Good  Evaluation/Treatment Tolerance: Patient tolerated treatment well    Plan: Continue with POC. Progress exercises as tolerated.  OP PT Plan  Treatment/Interventions: Education/ Instruction, Manual therapy, Neuromuscular re-education, Therapeutic activities, Therapeutic exercises  PT Plan: Skilled PT  PT Frequency: 1 time per week  Duration: 4 weeks  Onset Date: 06/03/24  Certification Period Start Date: 06/10/24  Certification Period End Date: 10/22/24  Number of Treatments Authorized: 15 of 16  Rehab Potential: Good  Plan of Care Agreement: Patient    Goals:  Active       PT Problem       Patient will achieve bilateral cervical side bending ROM at least 30 degrees (Progressing)       Start:  06/10/24    Expected End:  10/22/24            Patient will achieve bilateral cervical rotation ROM at least 60 degrees (Progressing)       Start:   06/10/24    Expected End:  10/22/24            Patient will achieve cervical flexion ROM at least 50 degrees (Progressing)       Start:  06/10/24    Expected End:  10/22/24            Patient will achieve cervical extension ROM at least 40 degrees (Not Progressing)       Start:  06/10/24    Expected End:  10/22/24            Patient will achieve bilateral shoulder flexion strength of at least 4+/5 (Met)       Start:  06/10/24    Expected End:  09/24/24    Resolved:  09/24/24         Patient will achieve bilateral shoulder abduction strength of at least 4+/5 (Progressing)       Start:  06/10/24    Expected End:  10/22/24            Patient will achieve bilateral shoulder external rotation strength of at least 4+/5 in neutral (Progressing)       Start:  06/10/24    Expected End:  10/22/24            Patient will achieve bilateral shoulder internal rotation strength of at least 4+/5 in neutral (Met)       Start:  06/10/24    Expected End:  07/15/24    Resolved:  07/17/24         Patient will demonstrate independence in home program for support of progression (Met)       Start:  06/10/24    Expected End:  06/24/24    Resolved:  07/17/24         Patient will report pain of no more than 2/10 demonstrating a reduction of overall pain (Not Progressing)       Start:  06/10/24    Expected End:  10/22/24            Patient will show a significant change in NDI (62% to 52%) patient reported outcome tool to demonstrate subjective imporovement (Met)       Start:  06/10/24    Expected End:  07/15/24    Resolved:  07/17/24         PATIENT WILL SHOW A SIGNIFICANT CHANGE IN NDI (46% to 36%) PATIENT REPORTED OUTCOME TOOL TO DEMONSTRATE SUBJECTIVE IMPROVEMENT (Not Progressing)       Start:  07/17/24    Expected End:  10/22/24                PATIENT WILL ACHIEVE bilateral  STRENGTH OF at least 45 lbs IN THE three position (Progressing)       Start:  08/27/24    Expected End:  10/22/24                     Eduard Rausch, PT

## 2024-10-22 ENCOUNTER — TREATMENT (OUTPATIENT)
Dept: PHYSICAL THERAPY | Facility: HOSPITAL | Age: 54
End: 2024-10-22
Payer: MEDICAID

## 2024-10-22 DIAGNOSIS — M47.12 OTHER SPONDYLOSIS WITH MYELOPATHY, CERVICAL REGION: Primary | ICD-10-CM

## 2024-10-22 PROCEDURE — 97110 THERAPEUTIC EXERCISES: CPT | Mod: GP | Performed by: PHYSICAL THERAPIST

## 2024-10-22 ASSESSMENT — PAIN SCALES - GENERAL: PAINLEVEL_OUTOF10: 5 - MODERATE PAIN

## 2024-10-22 ASSESSMENT — PAIN - FUNCTIONAL ASSESSMENT: PAIN_FUNCTIONAL_ASSESSMENT: 0-10

## 2024-10-22 NOTE — PROGRESS NOTES
Physical Therapy Treatment and Discharge     Patient Name: Eva Roca  MRN: [id number]  Today's Date: 10/22/2024  Time Calculation  Start Time: 1259  Stop Time: 1339  Time Calculation (min): 40 min        PT Therapeutic Procedures Time Entry  Therapeutic Exercise Time Entry: 40                Current Problem  1. Other spondylosis with myelopathy, cervical region  Follow Up In Physical Therapy        Problem List Items Addressed This Visit             ICD-10-CM    Other spondylosis with myelopathy, cervical region - Primary M47.12       General  Reason for Referral: neck pain  Referred By: Kings LAYNE  Preferred Learning Style: visual, kinesthetic    Subjective   Current Condition:   Same  Patient reports trying the wall angels at home and having difficulty with that exercise.    Performing HEP?: Yes    Precautions  Precautions  Post-Surgical Precautions: Spinal precautions  Pain  Pain Assessment: 0-10  0-10 (Numeric) Pain Score: 5 - Moderate pain  Pain Location: Neck  Pain Orientation: Right    Objective     Cervical AROM  Cervical flexion: (80°): 49  Cervical extension: (50°): 38  Cervical rotation right: (80°): 36  Cervical rotation left: (80°): 31  Cervical sidebend right: (45°): 21  Cervical sidebend left: (45°): 13    Shoulder Strength  R shoulder flexion: (5/5): 4+/5  L shoulder flexion: (5/5): 4+/5  R shoulder abduction: (5/5): 4-/5  L shoulder abduction: (5/5): 4-/5  R shoulder ER: (5/5): 4/5  L shoulder ER: (5/5): 4/5  R shoulder IR: (5/5) : 4+/5  L shoulder IR: (5/5): 4+/5     Strength   R  strength: 37.66 lbs at position 3  L  strength: 40.0 lbs at position 3    Outcome Measures:  Other Measures  Neck Disability Index: 52%    Treatments:  Therapeutic Exercise  Therapeutic Exercise Performed: Yes  Therapeutic Exercise Activity 1: wall angels x10  Therapeutic Exercise Activity 3: shoulder flexion x10 standing back against wall  Therapeutic Exercise Activity 6: shoulder ER with scap retraction  yellow x10  Therapeutic Exercise Activity 7: scapular retraction blue x30  Therapeutic Exercise Activity 8: B Shoulder Extension blue x30  Therapeutic Exercise Activity 10: UBE lvl 1 2' fwd/bwd seat #2         EDUCATION:   Individual(s) Educated: Patient  Education Provided: yes  Handout(s) Provided: Scanned into chart  Home Program: reviewed home exercise program   Risk and Benefits Discussed with Patient/Caregiver/Other: Yes   Patient/Caregiver Demonstrated Understanding: Yes   Patient Response to Education: Patient/Caregiver verbalized understanding of information, Patient/Caregiver performed return demonstration of exercises/activities, and Patient/Caregiver asked appropriate questions    Assessment: Patient's progress towards her physical therapy goals appears to have reached a plateau. Patient continues to have limited cervical spine range of motion and impaired upper extremity/ strength. Patient is looking at resuming OT. Physical therapy will discharge patient at this time, patient agrees with plan.  PT Assessment  PT Assessment Results: Decreased strength, Decreased range of motion, Impaired balance, Decreased mobility, Impaired sensation, Orthopedic restrictions, Pain  Rehab Prognosis: Good  Evaluation/Treatment Tolerance: Patient tolerated treatment well    Plan: Continue with POC. Discharge physical therapy   OP PT Plan  Treatment/Interventions: Education/ Instruction, Manual therapy, Neuromuscular re-education, Therapeutic activities, Therapeutic exercises  PT Plan: No Additional PT interventions required at this time  Onset Date: 06/03/24  Certification Period Start Date: 06/10/24  Certification Period End Date: 10/22/24  Number of Treatments Authorized: 16 of 16  Rehab Potential: Good  Plan of Care Agreement: Patient    Goals:  Active       PT Problem       Patient will achieve bilateral cervical side bending ROM at least 30 degrees (Not Progressing)       Start:  06/10/24    Expected End:   10/22/24            Patient will achieve bilateral cervical rotation ROM at least 60 degrees (Not Progressing)       Start:  06/10/24    Expected End:  10/22/24            Patient will achieve cervical flexion ROM at least 50 degrees (Progressing)       Start:  06/10/24    Expected End:  10/22/24            Patient will achieve cervical extension ROM at least 40 degrees (Progressing)       Start:  06/10/24    Expected End:  10/22/24            Patient will achieve bilateral shoulder flexion strength of at least 4+/5 (Met)       Start:  06/10/24    Expected End:  09/24/24    Resolved:  09/24/24         Patient will achieve bilateral shoulder abduction strength of at least 4+/5 (Not Progressing)       Start:  06/10/24    Expected End:  10/22/24            Patient will achieve bilateral shoulder external rotation strength of at least 4+/5 in neutral (Progressing)       Start:  06/10/24    Expected End:  10/22/24            Patient will achieve bilateral shoulder internal rotation strength of at least 4+/5 in neutral (Met)       Start:  06/10/24    Expected End:  07/15/24    Resolved:  07/17/24         Patient will demonstrate independence in home program for support of progression (Met)       Start:  06/10/24    Expected End:  06/24/24    Resolved:  07/17/24         Patient will report pain of no more than 2/10 demonstrating a reduction of overall pain (Not Progressing)       Start:  06/10/24    Expected End:  10/22/24            Patient will show a significant change in NDI (62% to 52%) patient reported outcome tool to demonstrate subjective imporovement (Met)       Start:  06/10/24    Expected End:  07/15/24    Resolved:  07/17/24         PATIENT WILL SHOW A SIGNIFICANT CHANGE IN NDI (46% to 36%) PATIENT REPORTED OUTCOME TOOL TO DEMONSTRATE SUBJECTIVE IMPROVEMENT (Not Progressing)       Start:  07/17/24    Expected End:  10/22/24                PATIENT WILL ACHIEVE bilateral  STRENGTH OF at least 45 lbs IN THE  three position (Progressing)       Start:  08/27/24    Expected End:  10/22/24                     Eduard Rausch, PT

## 2024-11-04 ENCOUNTER — APPOINTMENT (OUTPATIENT)
Dept: OCCUPATIONAL THERAPY | Facility: HOSPITAL | Age: 54
End: 2024-11-04
Payer: MEDICAID

## 2024-11-04 ENCOUNTER — DOCUMENTATION (OUTPATIENT)
Dept: OCCUPATIONAL THERAPY | Facility: HOSPITAL | Age: 54
End: 2024-11-04
Payer: MEDICAID

## 2024-11-04 NOTE — PROGRESS NOTES
Occupational Therapy  Therapy Communication Note    Patient Name: Eva Roca  MRN: [id number]  Department:   Room: Room/bed info not found  Today's Date: 11/4/2024     Discipline: Occupational Therapy    Missed Time: No Show    Comment: Patient no showed for OT evaluation today. Phoned patient to inform her of missed appt, patient answered and requested to reschedule. Patient transferred to  to schedule appt.

## 2024-11-07 ENCOUNTER — EVALUATION (OUTPATIENT)
Dept: OCCUPATIONAL THERAPY | Facility: HOSPITAL | Age: 54
End: 2024-11-07
Payer: MEDICAID

## 2024-11-07 DIAGNOSIS — R27.8 COORDINATION IMPAIRMENT: Primary | ICD-10-CM

## 2024-11-07 DIAGNOSIS — M47.12 OTHER SPONDYLOSIS WITH MYELOPATHY, CERVICAL REGION: ICD-10-CM

## 2024-11-07 PROCEDURE — 97110 THERAPEUTIC EXERCISES: CPT | Mod: GO | Performed by: OCCUPATIONAL THERAPIST

## 2024-11-07 PROCEDURE — 97165 OT EVAL LOW COMPLEX 30 MIN: CPT | Mod: GO | Performed by: OCCUPATIONAL THERAPIST

## 2024-11-07 SDOH — ECONOMIC STABILITY: FOOD INSECURITY: WITHIN THE PAST 12 MONTHS, THE FOOD YOU BOUGHT JUST DIDN'T LAST AND YOU DIDN'T HAVE MONEY TO GET MORE.: NEVER TRUE

## 2024-11-07 SDOH — ECONOMIC STABILITY: FOOD INSECURITY: WITHIN THE PAST 12 MONTHS, YOU WORRIED THAT YOUR FOOD WOULD RUN OUT BEFORE YOU GOT MONEY TO BUY MORE.: NEVER TRUE

## 2024-11-07 ASSESSMENT — ACTIVITIES OF DAILY LIVING (ADL)
ADL_ASSISTANCE: INDEPENDENT
BATHING_ASSISTANCE: INDEPENDENT

## 2024-11-07 ASSESSMENT — PAIN - FUNCTIONAL ASSESSMENT: PAIN_FUNCTIONAL_ASSESSMENT: 0-10

## 2024-11-07 ASSESSMENT — ENCOUNTER SYMPTOMS
DEPRESSION: 1
OCCASIONAL FEELINGS OF UNSTEADINESS: 1
LOSS OF SENSATION IN FEET: 0

## 2024-11-07 ASSESSMENT — PAIN SCALES - GENERAL: PAINLEVEL_OUTOF10: 5 - MODERATE PAIN

## 2024-11-07 NOTE — PROGRESS NOTES
Occupational Therapy  Occupational Therapy Evaluation    Patient Name: Eva Roca  MRN: [id number]  : 1970  Today's Date: 2024  Time Calculation  Start Time: 1515  Stop Time: 1601  Time Calculation (min): 46 min    Today's Charges:  OT Evaluation Time Entry  OT Evaluation (Low) Time Entry: 28  OT Therapeutic Procedures Time Entry  Therapeutic Exercise Time Entry: 18      Current Problem  Problem List Items Addressed This Visit             ICD-10-CM    Other spondylosis with myelopathy, cervical region M47.12     Other Visit Diagnoses         Codes    Coordination impairment    -  Primary R27.8    Relevant Orders    Follow Up In Occupational Therapy          Insurance  Payor: [employer] / Plan: [employer] / Product Type: *No Product type* /     Ambulatory Screenings Summary   Screening  Frequency  Date Last Completed   Spiritual and Cultural Beliefs   Screening each visit or episode of care 6/10/2024   Falls Risk Screening every ambulatory visit 2024   Pain Screening annually at primary care visit  2024   Domestic Violence screening annually at primary care visit 6/10/2024   Depression Screening annually in the primary care setting 10/17/2023   Suicide Risk Screening annually in the primary care setting 6/10/2024   Nutrition and Food Insecurity   Screening at least annually at primary care visit  2024   Key Learner annually in the primary care setting 6/10/2024   Drug Screen  10/7/2023 11:47 AM   Alcohol Screen  10/7/2023 11:47 AM   Advance Directive  Does not have.        Assessment  OT Assessment  OT Assessment Results: Decreased upper extremity range of motion, Decreased upper extremity strength, Decreased fine motor control, Decreased gross motor control, Decreased IADLs  Strengths: Ability to acquire knowledge, Attitude of self, Rehab experience  Level of Complexity: Low  Clinical Presentation: Stable and/or uncomplicated  characteristics  Pt. Tolerated session satisfactorily.  Patient is a 53 yo female  s/p cerviocthoracic decompression fixation fusion, posterior resulting in limited participation in pain-free ADLs and inability to perform at their prior level of function. Pt. Reports shoulder pain, decreased hand coordination and inability to complete work tasks as a . Pt. Displays decreased FM coordination, and strength. Pt would benefit from occupational therapy to address the above impairments in order to return to safe and pain-free ADLs and prior level of function.    Plan  Outpatient Plan  OT Plan: 1/6; tendon glides, FM coordination tasks  Frequency: 1x/wk  Duration: 6 weeks  Onset Date: 05/08/24  Certification Period Start Date: 11/07/24  Certification Period End Date: 12/20/24  Number of Treatments Authorized: 6  Rehab Potential: Good  Plan of Care Agreement: Patient  Planned Interventions: Therapeutic Exercise (38168), Therapeutic Activity (75042), Self-Care/Home Management (75065), Manual Therapy (54025), Neuromuscular Re-education (30627), Kinesiotaping, Hot Packs, and Cold Packs    General  OT Last Visit  OT Received On: 11/07/24  Reason for Referral: hand weakness  Referred By: Dr. Adorno  Previous Tests/Imaging): x rays  Medical History Form: Reviewed and scanned into chart   Previous Interventions/Treatments: Physical Therapy/Occupational Therapy  Primary Language: English  Preferred Learning Style: auditory, kinesthetic, verbal, visual, written    Red Flags:   Do you have any of the following? Yes - morning stiffness  Osteoporosis    Balance Difficulties/Falls  H/o CA Fever/chills   AM Stiffness Pacemaker Unexplained Weight Changes  Dizziness/Fainting     Unexplained Change in Bowel or Bladder Functions   Unexplained Malaise or Muscle Weakness  Night Pain/Sweats       Social Determinants of Health issues: No  Money  Unemployed/ work conditions  Education/Literacy Childhood experiences   Physical home  "environment Social supports/coping skills Healthy behaviors Access to healthcare     Subjective  General Comment: \"I've been doing exercises with 3 lbs.\" (\"I still can't feel what's in my hand.\")  Current condition since injury: Better    Pt.'s goals for therapy: \"be more comfortable/confident in using my hands\"    Precautions  Precautions  STEADI Fall Risk Score (The score of 4 or more indicates an increased risk of falling): 3  Medical Precautions: No known precautions/limitation    Pain  Pain Assessment  Pain Assessment: 0-10  0-10 (Numeric) Pain Score: 5 - Moderate pain  Pain Type: Acute pain  Pain Location: Shoulder  Pain Orientation: Right  As a result of this session, pt. reported pain increased .  End of session pain: 6/10 \"burning more.\"  Aggravating Factors: Lifting/Carrying , Pulling/Pushing , and Repetitive Motion  Relieving Factors: Rest and ibuprofen    Cognition  Cognition  Overall Cognitive Status: Within Functional Limits       Prior Level of Function/Home Living   Prior Function Per Pt/Caregiver Report  Level of [place]: Independent with ADLs and functional transfers, Independent with homemaking with ambulation  ADL Assistance: Independent  Homemaking Assistance: Independent  Ambulatory Assistance: Independent  IADL History  Homemaking Responsibilities: Yes  Meal Prep Responsibility: Primary  Laundry Responsibility: Primary  Cleaning Responsibility: Primary  Bill Paying/Finance Responsibility: Primary  Shopping Responsibility: Primary  Current License: Yes  Home Living  Type of Home: Apartment  Lives With: Alone  Home Living Comments: oldest son is staying with me.  Concerns with home environment? No    Current ADL/IADL Function  ADL Function  ADL  Eating Assistance: Independent  Eating Deficit:  (difficulty cutting meat)  Grooming Assistance: Independent  Bathing Assistance: Independent  UE Dressing Assistance: Independent  LE Dressing Assistance: Independent  Toileting Assistance with " Device: Independent  Functional Assistance: Independent    Coordination  Coordination  Movements are Fluid and Coordinated: Yes    Hand Function  Hand Function  Gross Grasp: Impaired  Coordination: Impaired    ROM/Strength  Right Hand Strength -  (lbs)  Handle Setting 2 (lbs): 41 lbs (42, 41, 40)  Right Hand Strength - Pinch (lbs)  Lateral (lbs): 11 lbs (10, 11, 12)  Tip 2 Point (lbs): 9.67 lbs (9, 9, 11)  Three Jaw Antonino (lbs): 10.33 lbs (10, 10, 11)     Left Hand Strength -  (lbs)  Handle Setting 2 (lbs): 48 lbs (48, 46, 50)  Left Hand Strength - Pinch (lbs)  Lateral (lbs): 9.67 lbs (10, 10, 9)  Tip 2 Point (lbs): 6.67 lbs (7, 7, 6)  Three Jaw Antonino (lbs): 11.67 lbs (12, 12, 11)    Treatment  This therapist instructed and demonstrated interventions to patient, patient completed the following under direct supervision of this therapist:  Therapeutic Exercise  Therapeutic Exercise Performed: Yes ROM and strengthening exercises completed this date. Pt. engaged in tendon glides x 10 reps. Handout provided.  Finger taps x10 reps. Crinkling paper x10 reps. Paper tearing into small pieces as well to increase finger coordination and digit recruitment. Patient reported fatigue in shoulders and neck following exercises.     Education  Education  Individual(s) Educated: Patient  Education Provided: Anatomy & Physiology, Diagnosis & Precautions, POC discussed and agreed upon, Risk and benefits of OT discussed with patient or other  Home Program: Handout issued  Risk and Benefits Discussed with Patient/Caregiver/Other: yes  Patient/Caregiver Demonstrated Understanding: yes  Plan of Care Discussed and Agreed Upon: yes  Patient Response to Education: Patient/Caregiver Verbalized Understanding of Information, Patient/Caregiver Performed Return Demonstration of Exercises/Activities, Patient/Caregiver Asked Appropriate Questions    HEP Provided Today: Yes - tendon glides    Outcome Measures  OT Adult Other Outcome  Measures  9 Hole Peg Test: RUE:52.3 LUE:42.6  Other Outcome Measures: QuickDASH:41 raw = 68.18    Goals  Active       OT Goals       Pt. will decrease QuickDASH score by 8 points to show improved ability to participate in daily tasks with minimal difficulty/pain.        Start:  11/07/24    Expected End:  12/20/24       QuickDASH:41 raw = 68.18         Pt. will demo increased BUE fine motor ability as evidenced by performance on 9 hole peg 30s.        Start:  11/07/24    Expected End:  12/20/24            Pt. will be able to perform self-care, household, work, and or leisure tasks with 0-2/10 pain rating, 100% of the time        Start:  11/07/24    Expected End:  11/29/24            Pt. will demonstrate MI with stating and demonstrating home exercise program in order to improve patient's ability to perform self-care, household, and/or leisure tasks.        Start:  11/07/24    Expected End:  12/20/24            Patient will endure throughout therapy sessions x45 minutes without complaint of shoulder/neck discomfort/fatigue to perform work tasks.        Start:  11/07/24    Expected End:  12/20/24                JOSE Smith/TEA

## 2024-11-07 NOTE — LETTER
November 7, 2024    Edwardo Adorno MD  [address]  [place] [address]    Patient: Eva Roca   YOB: 1970   Date of Visit: 11/7/2024       Dear Edwardo Adorno MD  [address] [facility] [address]  [place] [address]    The attached plan of care is being sent to you because your patient’s medical reimbursement requires that you certify the plan of care. Your signature is required to allow uninterrupted insurance coverage.      You may indicate your approval by signing below and faxing this form back to us at Dept Fax: [phone].    Please call Dept: [phone] with any questions or concerns.    Thank you for this referral,        Ami Felix OT  [place] [id number] [facility] [address]  [phone]    Payer: Payor: [employer] / Plan: [employer] / Product Type: *No Product type* /                                                                         Date:     Dear Ami Felix OT,     Re: Ms. Eva Roca, MRN:[id number]    I certify that I have reviewed the attached plan of care and it is medically necessary for Ms. Eva Roca (1970) who is under my care.          ______________________________________                    _________________  Provider name and credentials                                           Date and time                                                                                           Plan of Care 11/7/24   Effective from: 11/7/2024  Effective to: 12/20/2024    Plan ID: [id number]            Participants as of Finalize on 11/7/2024    Name Type Comments Contact Info    Ami Felix OT Occupational Therapist  [phone]    Edwardo Adorno MD Consulting Physician  [phone]       Last Plan Note     Author: Ami Felix OT Status: Sign when Signing Visit Last edited: 11/7/2024  3:15 PM          Occupational Therapy  Occupational Therapy Evaluation    Patient Name: Eva Roca  MRN: [id number]  : 1970  Today's Date: 2024  Time Calculation  Start Time: 1515  Stop Time: 1601  Time Calculation (min): 46 min    Today's Charges:  OT Evaluation Time Entry  OT Evaluation (Low) Time Entry: 28  OT Therapeutic Procedures Time Entry  Therapeutic Exercise Time Entry: 18      Current Problem  Problem List Items Addressed This Visit             ICD-10-CM    Other spondylosis with myelopathy, cervical region M47.12     Other Visit Diagnoses         Codes    Coordination impairment    -  Primary R27.8    Relevant Orders    Follow Up In Occupational Therapy          Insurance  Payor: [employer] / Plan: [employer] / Product Type: *No Product type* /     Ambulatory Screenings Summary   Screening  Frequency  Date Last Completed   Spiritual and Cultural Beliefs   Screening each visit or episode of care 6/10/2024   Falls Risk Screening every ambulatory visit 2024   Pain Screening annually at primary care visit  2024   Domestic Violence screening annually at primary care visit 6/10/2024   Depression Screening annually in the primary care setting 10/17/2023   Suicide Risk Screening annually in the primary care setting 6/10/2024   Nutrition and Food Insecurity   Screening at least annually at primary care visit  2024   Key Learner annually in the primary care setting 6/10/2024   Drug Screen  10/7/2023 11:47 AM   Alcohol Screen  10/7/2023 11:47 AM   Advance Directive  Does not have.        Assessment  OT Assessment  OT Assessment Results: Decreased upper extremity range of motion, Decreased upper extremity strength, Decreased fine motor control, Decreased gross motor control, Decreased IADLs  Strengths: Ability to acquire knowledge, Attitude of self, Rehab experience  Level of Complexity: Low  Clinical Presentation: Stable and/or uncomplicated  characteristics  Pt. Tolerated session satisfactorily.  Patient is a 53 yo female  s/p cerviocthoracic decompression fixation fusion, posterior resulting in limited participation in pain-free ADLs and inability to perform at their prior level of function. Pt. Reports shoulder pain, decreased hand coordination and inability to complete work tasks as a . Pt. Displays decreased FM coordination, and strength. Pt would benefit from occupational therapy to address the above impairments in order to return to safe and pain-free ADLs and prior level of function.    Plan  Outpatient Plan  OT Plan: 1/6; tendon glides, FM coordination tasks  Frequency: 1x/wk  Duration: 6 weeks  Onset Date: 05/08/24  Certification Period Start Date: 11/07/24  Certification Period End Date: 12/20/24  Number of Treatments Authorized: 6  Rehab Potential: Good  Plan of Care Agreement: Patient  Planned Interventions: Therapeutic Exercise (46735), Therapeutic Activity (06876), Self-Care/Home Management (14102), Manual Therapy (03882), Neuromuscular Re-education (52516), Kinesiotaping, Hot Packs, and Cold Packs    General  OT Last Visit  OT Received On: 11/07/24  Reason for Referral: hand weakness  Referred By: Dr. Adorno  Previous Tests/Imaging): x rays  Medical History Form: Reviewed and scanned into chart   Previous Interventions/Treatments: Physical Therapy/Occupational Therapy  Primary Language: English  Preferred Learning Style: auditory, kinesthetic, verbal, visual, written    Red Flags:   Do you have any of the following? Yes - morning stiffness  Osteoporosis    Balance Difficulties/Falls  H/o CA Fever/chills   AM Stiffness Pacemaker Unexplained Weight Changes  Dizziness/Fainting     Unexplained Change in Bowel or Bladder Functions   Unexplained Malaise or Muscle Weakness  Night Pain/Sweats       Social Determinants of Health issues: No  Money  Unemployed/ work conditions  Education/Literacy Childhood experiences   Physical home  "environment Social supports/coping skills Healthy behaviors Access to healthcare     Subjective  General Comment: \"I've been doing exercises with 3 lbs.\" (\"I still can't feel what's in my hand.\")  Current condition since injury: Better    Pt.'s goals for therapy: \"be more comfortable/confident in using my hands\"    Precautions  Precautions  STEADI Fall Risk Score (The score of 4 or more indicates an increased risk of falling): 3  Medical Precautions: No known precautions/limitation    Pain  Pain Assessment  Pain Assessment: 0-10  0-10 (Numeric) Pain Score: 5 - Moderate pain  Pain Type: Acute pain  Pain Location: Shoulder  Pain Orientation: Right  As a result of this session, pt. reported pain increased .  End of session pain: 6/10 \"burning more.\"  Aggravating Factors: Lifting/Carrying , Pulling/Pushing , and Repetitive Motion  Relieving Factors: Rest and ibuprofen    Cognition  Cognition  Overall Cognitive Status: Within Functional Limits       Prior Level of Function/Home Living   Prior Function Per Pt/Caregiver Report  Level of [place]: Independent with ADLs and functional transfers, Independent with homemaking with ambulation  ADL Assistance: Independent  Homemaking Assistance: Independent  Ambulatory Assistance: Independent  IADL History  Homemaking Responsibilities: Yes  Meal Prep Responsibility: Primary  Laundry Responsibility: Primary  Cleaning Responsibility: Primary  Bill Paying/Finance Responsibility: Primary  Shopping Responsibility: Primary  Current License: Yes  Home Living  Type of Home: Apartment  Lives With: Alone  Home Living Comments: oldest son is staying with me.  Concerns with home environment? No    Current ADL/IADL Function  ADL Function  ADL  Eating Assistance: Independent  Eating Deficit:  (difficulty cutting meat)  Grooming Assistance: Independent  Bathing Assistance: Independent  UE Dressing Assistance: Independent  LE Dressing Assistance: Independent  Toileting Assistance with " Device: Independent  Functional Assistance: Independent    Coordination  Coordination  Movements are Fluid and Coordinated: Yes    Hand Function  Hand Function  Gross Grasp: Impaired  Coordination: Impaired    ROM/Strength  Right Hand Strength -  (lbs)  Handle Setting 2 (lbs): 41 lbs (42, 41, 40)  Right Hand Strength - Pinch (lbs)  Lateral (lbs): 11 lbs (10, 11, 12)  Tip 2 Point (lbs): 9.67 lbs (9, 9, 11)  Three Jaw Antonino (lbs): 10.33 lbs (10, 10, 11)     Left Hand Strength -  (lbs)  Handle Setting 2 (lbs): 48 lbs (48, 46, 50)  Left Hand Strength - Pinch (lbs)  Lateral (lbs): 9.67 lbs (10, 10, 9)  Tip 2 Point (lbs): 6.67 lbs (7, 7, 6)  Three Jaw Antonino (lbs): 11.67 lbs (12, 12, 11)    Treatment  This therapist instructed and demonstrated interventions to patient, patient completed the following under direct supervision of this therapist:  Therapeutic Exercise  Therapeutic Exercise Performed: Yes ROM and strengthening exercises completed this date. Pt. engaged in tendon glides x 10 reps. Handout provided.  Finger taps x10 reps. Crinkling paper x10 reps. Paper tearing into small pieces as well to increase finger coordination and digit recruitment. Patient reported fatigue in shoulders and neck following exercises.     Education  Education  Individual(s) Educated: Patient  Education Provided: Anatomy & Physiology, Diagnosis & Precautions, POC discussed and agreed upon, Risk and benefits of OT discussed with patient or other  Home Program: Handout issued  Risk and Benefits Discussed with Patient/Caregiver/Other: yes  Patient/Caregiver Demonstrated Understanding: yes  Plan of Care Discussed and Agreed Upon: yes  Patient Response to Education: Patient/Caregiver Verbalized Understanding of Information, Patient/Caregiver Performed Return Demonstration of Exercises/Activities, Patient/Caregiver Asked Appropriate Questions    HEP Provided Today: Yes - tendon glides    Outcome Measures  OT Adult Other Outcome  Measures  9 Hole Peg Test: RUE:52.3 LUE:42.6  Other Outcome Measures: QuickDASH:41 raw = 68.18    Goals  Active       OT Goals       Pt. will decrease QuickDASH score by 8 points to show improved ability to participate in daily tasks with minimal difficulty/pain.        Start:  11/07/24    Expected End:  12/20/24       QuickDASH:41 raw = 68.18         Pt. will demo increased BUE fine motor ability as evidenced by performance on 9 hole peg 30s.        Start:  11/07/24    Expected End:  12/20/24            Pt. will be able to perform self-care, household, work, and or leisure tasks with 0-2/10 pain rating, 100% of the time        Start:  11/07/24    Expected End:  11/29/24            Pt. will demonstrate MI with stating and demonstrating home exercise program in order to improve patient's ability to perform self-care, household, and/or leisure tasks.        Start:  11/07/24    Expected End:  12/20/24            Patient will endure throughout therapy sessions x45 minutes without complaint of shoulder/neck discomfort/fatigue to perform work tasks.        Start:  11/07/24    Expected End:  12/20/24                Ami Felix OTR/L         Current Participants as of 11/7/2024    Name Type Comments Contact Info    Ami Felix OT Occupational Therapist  [phone]    Signature pending    Edwardo Adorno MD Consulting Physician  [phone]    Signature pending

## 2024-11-13 ENCOUNTER — APPOINTMENT (OUTPATIENT)
Dept: OCCUPATIONAL THERAPY | Facility: HOSPITAL | Age: 54
End: 2024-11-13
Payer: MEDICAID

## 2024-11-13 ENCOUNTER — DOCUMENTATION (OUTPATIENT)
Dept: OCCUPATIONAL THERAPY | Facility: HOSPITAL | Age: 54
End: 2024-11-13
Payer: MEDICAID

## 2024-11-13 NOTE — PROGRESS NOTES
Occupational Therapy   Therapy Communication Note    Patient Name: Eva Roca  MRN: [id number]  Department:   Room: Room/bed info not found  Today's Date: 11/13/2024     Discipline: Occupational Therapy    Missed Time: Cancel    Comment: Patient stated she was having a bad day.

## 2024-11-21 ENCOUNTER — APPOINTMENT (OUTPATIENT)
Dept: OCCUPATIONAL THERAPY | Facility: HOSPITAL | Age: 54
End: 2024-11-21
Payer: MEDICAID

## 2024-11-21 ENCOUNTER — TREATMENT (OUTPATIENT)
Dept: OCCUPATIONAL THERAPY | Facility: HOSPITAL | Age: 54
End: 2024-11-21
Payer: MEDICAID

## 2024-11-21 DIAGNOSIS — R27.8 COORDINATION IMPAIRMENT: ICD-10-CM

## 2024-11-21 PROCEDURE — 97530 THERAPEUTIC ACTIVITIES: CPT | Mod: GO | Performed by: OCCUPATIONAL THERAPIST

## 2024-11-21 ASSESSMENT — PAIN SCALES - GENERAL: PAINLEVEL_OUTOF10: 3

## 2024-11-21 ASSESSMENT — PAIN - FUNCTIONAL ASSESSMENT: PAIN_FUNCTIONAL_ASSESSMENT: 0-10

## 2024-11-21 NOTE — PROGRESS NOTES
Occupational Therapy  Occupational Therapy Treatment    Patient Name: Eva Roca  MRN: [id number]  : 1970  Today's Date: 2024  Time Calculation  Start Time: 1523  Stop Time: 1601  Time Calculation (min): 38 min    Today's Charges:  OT Therapeutic Procedures Time Entry  Therapeutic Activity Time Entry: 35  Therapeutic Exercise Time Entry: 3      Current Problem  Problem List Items Addressed This Visit    None  Visit Diagnoses         Codes    Coordination impairment     R27.8          Assessment  OT Assessment  OT Assessment Results: Decreased upper extremity range of motion, Decreased upper extremity strength, Decreased fine motor control, Decreased gross motor control, Decreased IADLs  Strengths: Ability to acquire knowledge, Attitude of self, Rehab experience, Support of extended family/friends  Clinical Presentation: Stable and/or uncomplicated characteristics  Progress towards goals: Improved fine motor ability.  Patient tolerated treatment satisfactorily.  Patient reports she is still having trouble feeling what is in her hand and being able to grasp/release objects without looking at them. Patient displays increased coordination however, still delayed. Educated on coordination tasks to engage in at home.     Insurance  Payor: [employer] / Plan: [employer] / Product Type: *No Product type* /     Plan  Outpatient Plan  OT Plan: ; review fine motor coordination  Frequency: 1x/wk  Duration: 6 weeks  Onset Date: 24  Certification Period Start Date: 24  Certification Period End Date: 24  Number of Treatments Authorized: 6  Rehab Potential: Good  Plan of Care Agreement: Patient  Planned Interventions: Therapeutic Exercise (55323), Therapeutic Activity (43054), Self-Care/Home Management (48557), Manual Therapy (79095), Neuromuscular Re-education (99902), Kinesiotaping, Hot Packs, and Cold Packs     General  OT Last Visit  OT Received  "On: 11/21/24  Reason for Referral: hand weakness  Referred By: Dr. Adorno  Primary Language: English  Preferred Learning Style: auditory, kinesthetic, verbal, visual, written    Subjective  General Comment: \"I am writing and doing scratch offs; I am going back to work next week.\"  Current condition since injury: Better   Performing HEP? Yes    Precautions  Precautions  Medical Precautions: No known precautions/limitation    Pain  Pain Assessment  Pain Assessment: 0-10  0-10 (Numeric) Pain Score: 3  Pain Type: Acute pain  Pain Location: Shoulder  As a result of session pt. Reported pain remained the same  End of session pain: 0/10    Treatment  This therapist instructed and demonstrated interventions to patient, patient completed the following under direct supervision of this therapist:  Therapeutic Exercise  Therapeutic Exercise Performed: Yes ROM and strengthening exercises completed this date. Finger taps and opposition x10 reps.     Therapeutic Activity  Therapeutic Activity Performed: Yes Engaged pt. in connect four task (mini) to increase fine motor ability engaging in translation, stabilization, and shifting in hand manipulation techniques. Pt.was instructed to stabilize 3 coins in their hand while shifting and translating the coin to the finger tips to place in the slot of the game. Patient was also encouraged to look away from hand when releasing coin. Patient displayed max difficulty without visual input as she missed the slot many times. Patient became frustrated with this task though it improved over time. Minimal compensation noted. Patient engaged in ball toss to focus on UE coordination. Pt completed bilaterally, initially instructed to toss ball pronated and catch off bounce supinated. Completed x15 reps with multiple trials to grasp habit. Secondly, patient engaged in ball toss with hand pronated the whole time x10 reps each hand. Increased coordination noted with this task. "     Education  Education  Individual(s) Educated: Patient  Education Provided: Anatomy & Physiology, Diagnosis & Precautions, POC discussed and agreed upon, Risk and benefits of OT discussed with patient or other  Risk and Benefits Discussed with Patient/Caregiver/Other: yes  Patient/Caregiver Demonstrated Understanding: yes  Plan of Care Discussed and Agreed Upon: yes  Patient Response to Education: Patient/Caregiver Verbalized Understanding of Information, Patient/Caregiver Performed Return Demonstration of Exercises/Activities, Patient/Caregiver Asked Appropriate Questions    HEP Provided Today: No  Education on coordination tasks including ball toss.         Goals  Active       OT Goals       Pt. will decrease QuickDASH score by 8 points to show improved ability to participate in daily tasks with minimal difficulty/pain.        Start:  11/07/24    Expected End:  12/20/24       QuickDASH:41 raw = 68.18         Pt. will demo increased BUE fine motor ability as evidenced by performance on 9 hole peg 30s.        Start:  11/07/24    Expected End:  12/20/24            Pt. will be able to perform self-care, household, work, and or leisure tasks with 0-2/10 pain rating, 100% of the time        Start:  11/07/24    Expected End:  11/29/24            Pt. will demonstrate MI with stating and demonstrating home exercise program in order to improve patient's ability to perform self-care, household, and/or leisure tasks.        Start:  11/07/24    Expected End:  12/20/24            Patient will endure throughout therapy sessions x45 minutes without complaint of shoulder/neck discomfort/fatigue to perform work tasks.        Start:  11/07/24    Expected End:  12/20/24                JOSE Smith/TEA

## 2024-11-25 ENCOUNTER — APPOINTMENT (OUTPATIENT)
Dept: OCCUPATIONAL THERAPY | Facility: HOSPITAL | Age: 54
End: 2024-11-25
Payer: MEDICAID

## 2024-12-02 ENCOUNTER — APPOINTMENT (OUTPATIENT)
Dept: OCCUPATIONAL THERAPY | Facility: HOSPITAL | Age: 54
End: 2024-12-02
Payer: MEDICAID

## 2024-12-05 ENCOUNTER — APPOINTMENT (OUTPATIENT)
Dept: OCCUPATIONAL THERAPY | Facility: HOSPITAL | Age: 54
End: 2024-12-05
Payer: MEDICAID

## 2024-12-09 ENCOUNTER — APPOINTMENT (OUTPATIENT)
Dept: OCCUPATIONAL THERAPY | Facility: HOSPITAL | Age: 54
End: 2024-12-09
Payer: MEDICAID

## 2024-12-12 ENCOUNTER — APPOINTMENT (OUTPATIENT)
Dept: OCCUPATIONAL THERAPY | Facility: HOSPITAL | Age: 54
End: 2024-12-12
Payer: MEDICAID

## 2024-12-12 ENCOUNTER — DOCUMENTATION (OUTPATIENT)
Dept: OCCUPATIONAL THERAPY | Facility: HOSPITAL | Age: 54
End: 2024-12-12
Payer: MEDICAID

## 2024-12-12 NOTE — PROGRESS NOTES
Occupational Therapy  Therapy Communication Note    Patient Name: Eva Roca  MRN: [id number]  Department:   Room: Room/bed info not found  Today's Date: 12/12/2024     Discipline: Occupational Therapy    Missed Time: Cancel    Comment: Patient canceled today d/t weather.

## 2024-12-16 ENCOUNTER — TREATMENT (OUTPATIENT)
Dept: OCCUPATIONAL THERAPY | Facility: HOSPITAL | Age: 54
End: 2024-12-16
Payer: MEDICAID

## 2024-12-16 DIAGNOSIS — R27.8 COORDINATION IMPAIRMENT: ICD-10-CM

## 2024-12-16 PROCEDURE — 97530 THERAPEUTIC ACTIVITIES: CPT | Mod: GO | Performed by: OCCUPATIONAL THERAPIST

## 2024-12-16 PROCEDURE — 97110 THERAPEUTIC EXERCISES: CPT | Mod: GO | Performed by: OCCUPATIONAL THERAPIST

## 2024-12-16 ASSESSMENT — PAIN SCALES - GENERAL: PAINLEVEL_OUTOF10: 0 - NO PAIN

## 2024-12-16 ASSESSMENT — PAIN - FUNCTIONAL ASSESSMENT: PAIN_FUNCTIONAL_ASSESSMENT: 0-10

## 2024-12-16 NOTE — LETTER
December 16, 2024    Edwardo Adorno MD  [address] [facility] [address]  [facility] [address]    Patient: Eva Roca   YOB: 1970   Date of Visit: 12/16/2024       Dear Jose Adorno MD  [address]  [place] [address]    The attached plan of care is being sent to you because your patient’s medical reimbursement requires that you certify the plan of care. Your signature is required to allow uninterrupted insurance coverage.      You may indicate your approval by signing below and faxing this form back to us at Dept Fax: [phone].    Please call Dept: [phone] with any questions or concerns.    Thank you for this referral,        Ami Felix OT  [place] [address] [facility] [address]  [phone]    Payer: Payor: [employer] / Plan: [employer] / Product Type: *No Product type* /                                                                         Date:     Dear Ami Felix OT,     Re: Ms. Eva Roca, MRN:[id number]    I certify that I have reviewed the attached plan of care and it is medically necessary for Ms. Eva Roca (1970) who is under my care.          ______________________________________                    _________________  Provider name and credentials                                           Date and time                                                                                           Plan of Care 12/16/24   Effective from: 12/16/2024  Effective to: 1/31/2025    Plan ID: [id number]            Participants as of Finalize on 12/16/2024    Name Type Comments Contact Info    Ami Felix OT Occupational Therapist  [phone]    Edwardo Adorno MD Consulting Physician  [phone]       Last Plan Note     Author: Ami Felix OT Status: Incomplete Last edited: 12/16/2024  4:00 PM         Occupational  "Therapy  Occupational Therapy Treatment    Patient Name: Eva Roca  MRN: [id number]  : 1970  Today's Date: 2024  Time Calculation  Start Time: 1606  Stop Time: 1655  Time Calculation (min): 49 min    Today's Charges:  OT Therapeutic Procedures Time Entry  Therapeutic Activity Time Entry: 14  Therapeutic Exercise Time Entry: 35      Current Problem  Problem List Items Addressed This Visit    None  Visit Diagnoses         Codes    Coordination impairment     R27.8          Assessment  OT Assessment  OT Assessment Results: Decreased upper extremity range of motion, Decreased upper extremity strength, Decreased fine motor control, Decreased gross motor control, Decreased IADLs  Clinical Presentation: Stable and/or uncomplicated characteristics  Progress towards goals: Patient reports there has not been a significant change in functional abilities.  Patient tolerated treatment satisfactorily.  Patient displays decline towards goals since recent fall with head injury. Recommend patient reach out to ortho clinic as she hit her head with fall to rule out any hardware issues. Reports no neck pain just increased numbness and \"sandpaper\" feeling in hands. She reports she has noted increased difficulty with FM coordination activities such as buttoning buttons since fall. Continue to recommend OT at this time to maximize functional outcomes and performance in daily occupations.     Insurance  Payor: [employer] / Plan: [employer] / Product Type: *No Product type* /     Plan  Outpatient Plan  OT Plan: 3/6; review fine motor coordination  Frequency: 1x/wk  Duration: 6 weeks  Onset Date: 24  Certification Period Start Date: 24  Certification Period End Date: 24  Number of Treatments Authorized: 6  Rehab Potential: Good  Plan of Care Agreement: Patient  Planned Interventions: Therapeutic Exercise (68000), Therapeutic Activity (01513), Self-Care/Home " "Management (08572), Manual Therapy (95373), Neuromuscular Re-education (99201), Kinesiotaping, Hot Packs, and Cold Packs     General  OT Last Visit  OT Received On: 12/16/24  Reason for Referral: hand weakness  Referred By: Dr. Adorno  Primary Language: English  Preferred Learning Style: auditory, kinesthetic, verbal, visual, written    Subjective  General Comment: \"I haven't been doing much because I fell and broke my knee cap.\"  Current condition since injury: Worse - d/t recent injury   Performing HEP? No    Precautions  Precautions  Medical Precautions: No known precautions/limitation    Pain  Pain Assessment  Pain Assessment: 0-10  0-10 (Numeric) Pain Score: 0 - No pain  As a result of session pt. Reported pain increased   End of session pain: 2/10    ROM/Strength     Right Hand Strength -  (lbs)  Handle Setting 2 (lbs): 35 lbs (33, 37, 35)  Right Hand Strength - Pinch (lbs)  Lateral (lbs): 11 lbs (11, 11, 11)  Tip 2 Point (lbs): 9.67 lbs (8, 10, 11)  Three Jaw Antonino (lbs): 11.67 lbs (12, 12, 11)     Left Hand Strength -  (lbs)  Handle Setting 2 (lbs): 47 lbs (51, 43, 47)  Left Hand Strength - Pinch (lbs)  Lateral (lbs): 10.67 lbs (10, 10, 12)  Tip 2 Point (lbs): 7 lbs (7, 7, 7)  Three Jaw Antonino (lbs): 10 lbs (10, 10, 10)      Treatment  This therapist instructed and demonstrated interventions to patient, patient completed the following under direct supervision of this therapist:  Therapeutic Exercise  Therapeutic Exercise Performed: Yes ROM and strengthening exercises completed this date. Shoulder abduction, cross body, and flexion stretch x10 reps with 10s holds. Pt. engaged in shoulder ROM stretches this date including shoulder rolls fwd/back x 10 reps, shoulder squeezes x 10 reps with 10s holds, neck lateral flexion x 10 reps with 10s holds, neck rotation x 10 reps with 10s holds, neck flexion/extension 10 reps, 10s holds. Wrist circles x10 and wrist ABCs. Patient tolerated without complaint. " "Encouraged continuation of FM coordination activities at home. Ideas for FM coordination activities provided.     Therapeutic Activity  Therapeutic Activity Performed: Yes Re-eval completed this date. QuickDASH completed. Goals discussed. Strength/ROM and fine motor measurements taken. Patient did not display improvement towards FM coordination goal today. Appears her strength has decreased as well. QuickDash score also worsened. States she is having more difficulty with FM coordination again and noted increased \"sandpaper\" feeling in hands since fall resulting in knee injury. D/t limited improvement in 3 sessions and recent fall will trial therapy to see if improvement will occur as patient had significant issues with fall and attending therapy consistently with starting work and weather. Recommend continued therapy to maximize outcomes at this time.     Education  Education  Individual(s) Educated: Patient  Education Provided: Anatomy & Physiology, Diagnosis & Precautions, POC discussed and agreed upon, Risk and benefits of OT discussed with patient or other  Risk and Benefits Discussed with Patient/Caregiver/Other: yes  Patient/Caregiver Demonstrated Understanding: yes  Plan of Care Discussed and Agreed Upon: yes  Patient Response to Education: Patient/Caregiver Verbalized Understanding of Information, Patient/Caregiver Performed Return Demonstration of Exercises/Activities, Patient/Caregiver Asked Appropriate Questions    HEP Provided Today: No  FM coordination sheet     Outcome Measures  OT Adult Other Outcome Measures  9 Hole Peg Test: RUE: 52.3; LUE: 42.5  Other Outcome Measures: QuickDASH: 46 raw = 79.55      Goals  Active       OT Goals       Pt. will decrease QuickDASH score by 8 points to show improved ability to participate in daily tasks with minimal difficulty/pain.  (Not Progressing)       Start:  11/07/24    Expected End:  01/31/25       QuickDASH:41 raw = 68.18      Goal Note       12/16: 46 raw " "=79.55              Pt. will demo increased BUE fine motor ability as evidenced by performance on 9 hole peg 30s.  (Not Progressing)       Start:  11/07/24    Expected End:  01/31/25         Goal Note       12/16: RUE: 52.3; LUE: 42.5              Pt. will be able to perform self-care, household, work, and or leisure tasks with 0-2/10 pain rating, 100% of the time  (Met)       Start:  11/07/24    Expected End:  11/29/24    Resolved:  12/16/24      Goal Note       12/16: yes with UE; now that I have this leg problem its an issue               Pt. will demonstrate MI with stating and demonstrating home exercise program in order to improve patient's ability to perform self-care, household, and/or leisure tasks.  (Progressing)       Start:  11/07/24    Expected End:  01/03/25         Goal Note       12/16: not currently doing it because of new LE injury \"I'm getting back to it.\"              Patient will endure throughout therapy sessions x45 minutes without complaint of shoulder/neck discomfort/fatigue to perform work tasks.  (Met)       Start:  11/07/24    Expected End:  12/20/24    Resolved:  12/16/24      Goal Note       12/16: \"I feel good when I walk out of here.\"              Pt. will increase RUE hand strength to increase participation in self-care, household, and leisure tasks.  41 lbs;         Start:  12/16/24    Expected End:  01/31/25                    JOSE Smith/TEA         Current Participants as of 12/16/2024    Name Type Comments Contact Info    Ami Felix OT Occupational Therapist  [phone]    Signature pending    Edwardo Adorno MD Consulting Physician  [phone]    Signature pending      "

## 2024-12-16 NOTE — PROGRESS NOTES
"  Occupational Therapy  Occupational Therapy Treatment/RE-check    Patient Name: Eav Roca  MRN: [id number]  : 1970  Today's Date: 2024  Time Calculation  Start Time: 1606  Stop Time: 1655  Time Calculation (min): 49 min    Today's Charges:  OT Therapeutic Procedures Time Entry  Therapeutic Activity Time Entry: 14  Therapeutic Exercise Time Entry: 35      Current Problem  Problem List Items Addressed This Visit    None  Visit Diagnoses         Codes    Coordination impairment     R27.8          Assessment  OT Assessment  OT Assessment Results: Decreased upper extremity range of motion, Decreased upper extremity strength, Decreased fine motor control, Decreased gross motor control, Decreased IADLs  Clinical Presentation: Stable and/or uncomplicated characteristics  Progress towards goals: Patient reports there has not been a significant change in functional abilities.  Patient tolerated treatment satisfactorily.  Patient displays decline towards goals since recent fall with head injury. Recommend patient reach out to ortho clinic as she hit her head with fall to rule out any hardware issues. Reports no neck pain just increased numbness and \"sandpaper\" feeling in hands. She reports she has noted increased difficulty with FM coordination activities such as buttoning buttons since fall. Continue to recommend OT at this time to maximize functional outcomes and performance in daily occupations.     Insurance  Payor: [employer] / Plan: [employer] / Product Type: *No Product type* /     Plan  Outpatient Plan  OT Plan: 3/6; review fine motor coordination  Frequency: 1x/wk  Duration: 6 weeks  Onset Date: 24  Certification Period Start Date: 24  Certification Period End Date: 24  Number of Treatments Authorized: 6  Rehab Potential: Good  Plan of Care Agreement: Patient  Planned Interventions: Therapeutic Exercise (22559), Therapeutic Activity " "(41141), Self-Care/Home Management (89385), Manual Therapy (29452), Neuromuscular Re-education (83657), Kinesiotaping, Hot Packs, and Cold Packs     General  OT Last Visit  OT Received On: 12/16/24  Reason for Referral: hand weakness  Referred By: Dr. Adorno  Primary Language: English  Preferred Learning Style: auditory, kinesthetic, verbal, visual, written    Subjective  General Comment: \"I haven't been doing much because I fell and broke my knee cap.\"  Current condition since injury: Worse - d/t recent injury   Performing HEP? No    Precautions  Precautions  Medical Precautions: No known precautions/limitation    Pain  Pain Assessment  Pain Assessment: 0-10  0-10 (Numeric) Pain Score: 0 - No pain  As a result of session pt. Reported pain increased   End of session pain: 2/10    ROM/Strength     Right Hand Strength -  (lbs)  Handle Setting 2 (lbs): 35 lbs (33, 37, 35)  Right Hand Strength - Pinch (lbs)  Lateral (lbs): 11 lbs (11, 11, 11)  Tip 2 Point (lbs): 9.67 lbs (8, 10, 11)  Three Jaw Antonino (lbs): 11.67 lbs (12, 12, 11)     Left Hand Strength -  (lbs)  Handle Setting 2 (lbs): 47 lbs (51, 43, 47)  Left Hand Strength - Pinch (lbs)  Lateral (lbs): 10.67 lbs (10, 10, 12)  Tip 2 Point (lbs): 7 lbs (7, 7, 7)  Three Jaw Antonino (lbs): 10 lbs (10, 10, 10)      Treatment  This therapist instructed and demonstrated interventions to patient, patient completed the following under direct supervision of this therapist:  Therapeutic Exercise  Therapeutic Exercise Performed: Yes ROM and strengthening exercises completed this date. Shoulder abduction, cross body, and flexion stretch x10 reps with 10s holds. Pt. engaged in shoulder ROM stretches this date including shoulder rolls fwd/back x 10 reps, shoulder squeezes x 10 reps with 10s holds, neck lateral flexion x 10 reps with 10s holds, neck rotation x 10 reps with 10s holds, neck flexion/extension 10 reps, 10s holds. Wrist circles x10 and wrist ABCs. Patient tolerated " "without complaint. Encouraged continuation of FM coordination activities at home. Ideas for FM coordination activities provided.     Therapeutic Activity  Therapeutic Activity Performed: Yes Re-eval completed this date. QuickDASH completed. Goals discussed. Strength/ROM and fine motor measurements taken. Patient did not display improvement towards FM coordination goal today. Appears her strength has decreased as well. QuickDash score also worsened. States she is having more difficulty with FM coordination again and noted increased \"sandpaper\" feeling in hands since fall resulting in knee injury. D/t limited improvement in 3 sessions and recent fall will trial therapy to see if improvement will occur as patient had significant issues with fall and attending therapy consistently with starting work and weather. Recommend continued therapy to maximize outcomes at this time.     Education  Education  Individual(s) Educated: Patient  Education Provided: Anatomy & Physiology, Diagnosis & Precautions, POC discussed and agreed upon, Risk and benefits of OT discussed with patient or other  Risk and Benefits Discussed with Patient/Caregiver/Other: yes  Patient/Caregiver Demonstrated Understanding: yes  Plan of Care Discussed and Agreed Upon: yes  Patient Response to Education: Patient/Caregiver Verbalized Understanding of Information, Patient/Caregiver Performed Return Demonstration of Exercises/Activities, Patient/Caregiver Asked Appropriate Questions    HEP Provided Today: No  FM coordination sheet     Outcome Measures  OT Adult Other Outcome Measures  9 Hole Peg Test: RUE: 52.3; LUE: 42.5  Other Outcome Measures: QuickDASH: 46 raw = 79.55      Goals  Active       OT Goals       Pt. will decrease QuickDASH score by 8 points to show improved ability to participate in daily tasks with minimal difficulty/pain.  (Not Progressing)       Start:  11/07/24    Expected End:  01/31/25       QuickDASH:41 raw = 68.18      Goal Note  " "     12/16: 46 raw =79.55              Pt. will demo increased BUE fine motor ability as evidenced by performance on 9 hole peg 30s.  (Not Progressing)       Start:  11/07/24    Expected End:  01/31/25         Goal Note       12/16: RUE: 52.3; LUE: 42.5              Pt. will be able to perform self-care, household, work, and or leisure tasks with 0-2/10 pain rating, 100% of the time  (Met)       Start:  11/07/24    Expected End:  11/29/24    Resolved:  12/16/24      Goal Note       12/16: yes with UE; now that I have this leg problem its an issue               Pt. will demonstrate MI with stating and demonstrating home exercise program in order to improve patient's ability to perform self-care, household, and/or leisure tasks.  (Progressing)       Start:  11/07/24    Expected End:  01/03/25         Goal Note       12/16: not currently doing it because of new LE injury \"I'm getting back to it.\"              Patient will endure throughout therapy sessions x45 minutes without complaint of shoulder/neck discomfort/fatigue to perform work tasks.  (Met)       Start:  11/07/24    Expected End:  12/20/24    Resolved:  12/16/24      Goal Note       12/16: \"I feel good when I walk out of here.\"              Pt. will increase RUE hand strength to increase participation in self-care, household, and leisure tasks.  41 lbs;         Start:  12/16/24    Expected End:  01/31/25                    Ami Felix OTR/L  "

## 2024-12-31 ENCOUNTER — TREATMENT (OUTPATIENT)
Dept: OCCUPATIONAL THERAPY | Facility: HOSPITAL | Age: 54
End: 2024-12-31
Payer: MEDICAID

## 2024-12-31 DIAGNOSIS — R27.8 COORDINATION IMPAIRMENT: ICD-10-CM

## 2024-12-31 PROCEDURE — 97530 THERAPEUTIC ACTIVITIES: CPT | Mod: GO | Performed by: OCCUPATIONAL THERAPIST

## 2024-12-31 ASSESSMENT — PAIN DESCRIPTION - DESCRIPTORS: DESCRIPTORS: ACHING

## 2024-12-31 ASSESSMENT — PAIN SCALES - GENERAL: PAINLEVEL_OUTOF10: 2

## 2024-12-31 ASSESSMENT — PAIN - FUNCTIONAL ASSESSMENT: PAIN_FUNCTIONAL_ASSESSMENT: 0-10

## 2024-12-31 NOTE — PROGRESS NOTES
Occupational Therapy  Occupational Therapy Treatment    Patient Name: Eva Roca  MRN: [id number]  : 1970  Today's Date: 2024  Time Calculation  Start Time: 1529  Stop Time: 1600  Time Calculation (min): 31 min    Today's Charges:  OT Therapeutic Procedures Time Entry  Therapeutic Activity Time Entry: 31    Current Problem  Problem List Items Addressed This Visit    None  Visit Diagnoses         Codes    Coordination impairment     R27.8          Assessment  OT Assessment  OT Assessment Results: Decreased upper extremity range of motion, Decreased upper extremity strength, Decreased fine motor control, Decreased gross motor control, Decreased IADLs  Strengths: Ability to acquire knowledge, Attitude of self, Premorbid level of function, Rehab experience  Clinical Presentation: Stable and/or uncomplicated characteristics  Progress towards goals: Improved ROM. Improved grasp.   Patient tolerated treatment satisfactorily.  Patient reports her hand still gets heavy from time to time. Reports she still has numbness to R hand and without visual input she fumbles more. Noted continued difficulty with in hand manipulation. Continue to recommend FM activities at home to strengthen and increase coordination in her hands.     Insurance  Payor: [employer] / Plan: [employer] / Product Type: *No Product type* /     Plan  Outpatient Plan  OT Plan: ; review fine motor coordination  Frequency: 1x/wk  Duration: 6 weeks  Onset Date: 24  Certification Period Start Date: 24  Certification Period End Date: 24  Number of Treatments Authorized: 9  Rehab Potential: Good  Plan of Care Agreement: Patient  Planned Interventions: Therapeutic Exercise (72683), Therapeutic Activity (27313), Self-Care/Home Management (73208), Manual Therapy (45154), Neuromuscular Re-education (17738), Kinesiotaping, Hot Packs, and Cold Packs     General  OT Last Visit  OT Received  "On: 12/31/24  Reason for Referral: hand weakness  Referred By: Dr. Adorno  Primary Language: English  Preferred Learning Style: auditory, kinesthetic, verbal, visual, written    Subjective  General Comment: \"I notice my R arm is still heavy and numb still.\"  Current condition since injury: Better   Performing HEP? Yes    Precautions  Precautions  Medical Precautions: No known precautions/limitation    Pain  Pain Assessment  Pain Assessment: 0-10  0-10 (Numeric) Pain Score: 2  Pain Location: Hand  Pain Orientation: Right, Left  Pain Descriptors: Aching (arthritic pain)  As a result of session pt. Reported pain remained the same  End of session pain: 2/10    Treatment  This therapist instructed and demonstrated interventions to patient, patient completed the following under direct supervision of this therapist:     Therapeutic Activity  Therapeutic Activity Performed: Yes Patient engaged in FM coordination activities today. Patient engaged in card shuffling x10 reps. Noted increased difficulty with R hand but was able to coordinate enough to complete the task with some lost cards. Patient engaged in card flip x15 reps each hand. No reported difficulty. Engaged pt. in connect four task (mini) to increase fine motor ability engaging in translation, stabilization, and shifting in hand manipulation techniques. Pt.was instructed to stabilize 3 coins in their hand while shifting and translating the coin to the finger tips to place in the slot of the game. Patient displayed moderate difficulty with translation and placing coins in the coin slots. Increased performance noted with time. Engaged in FM coordination activity with tweezers and pom balls. Encouraged patient to hold tweezers as she would if completing grooming task and encouraged to remove the palm balls with the tweezers. Patient reported moderate difficulty with this task. Patient provided for home use.     Education  Education  Individual(s) Educated: " "Patient  Education Provided: Anatomy & Physiology, Diagnosis & Precautions, POC discussed and agreed upon, Risk and benefits of OT discussed with patient or other  Risk and Benefits Discussed with Patient/Caregiver/Other: yes  Patient/Caregiver Demonstrated Understanding: yes  Plan of Care Discussed and Agreed Upon: yes  Patient Response to Education: Patient/Caregiver Verbalized Understanding of Information, Patient/Caregiver Performed Return Demonstration of Exercises/Activities, Patient/Caregiver Asked Appropriate Questions    HEP Provided Today: No    Goals  Active       OT Goals       Pt. will decrease QuickDASH score by 8 points to show improved ability to participate in daily tasks with minimal difficulty/pain.  (Not Progressing)       Start:  11/07/24    Expected End:  01/31/25       QuickDASH:41 raw = 68.18      Goal Note       12/16: 46 raw =79.55              Pt. will demo increased BUE fine motor ability as evidenced by performance on 9 hole peg 30s.  (Not Progressing)       Start:  11/07/24    Expected End:  01/31/25         Goal Note       12/16: RUE: 52.3; LUE: 42.5              Pt. will be able to perform self-care, household, work, and or leisure tasks with 0-2/10 pain rating, 100% of the time  (Met)       Start:  11/07/24    Expected End:  11/29/24    Resolved:  12/16/24      Goal Note       12/16: yes with UE; now that I have this leg problem its an issue               Pt. will demonstrate MI with stating and demonstrating home exercise program in order to improve patient's ability to perform self-care, household, and/or leisure tasks.  (Progressing)       Start:  11/07/24    Expected End:  01/03/25         Goal Note       12/16: not currently doing it because of new LE injury \"I'm getting back to it.\"              Patient will endure throughout therapy sessions x45 minutes without complaint of shoulder/neck discomfort/fatigue to perform work tasks.  (Met)       Start:  11/07/24    Expected End:  " "12/20/24    Resolved:  12/16/24      Goal Note       12/16: \"I feel good when I walk out of here.\"              Pt. will increase RUE hand strength to increase participation in self-care, household, and leisure tasks.  41 lbs;         Start:  12/16/24    Expected End:  01/31/25                    Ami Felix OTR/TEA  "

## 2025-01-07 ENCOUNTER — TREATMENT (OUTPATIENT)
Dept: OCCUPATIONAL THERAPY | Facility: HOSPITAL | Age: 55
End: 2025-01-07
Payer: MEDICAID

## 2025-01-07 DIAGNOSIS — R27.8 COORDINATION IMPAIRMENT: Primary | ICD-10-CM

## 2025-01-07 PROCEDURE — 97110 THERAPEUTIC EXERCISES: CPT | Mod: GO | Performed by: OCCUPATIONAL THERAPIST

## 2025-01-07 PROCEDURE — 97530 THERAPEUTIC ACTIVITIES: CPT | Mod: GO | Performed by: OCCUPATIONAL THERAPIST

## 2025-01-07 ASSESSMENT — PAIN - FUNCTIONAL ASSESSMENT: PAIN_FUNCTIONAL_ASSESSMENT: 0-10

## 2025-01-07 ASSESSMENT — PAIN SCALES - GENERAL: PAINLEVEL_OUTOF10: 3

## 2025-01-07 NOTE — PROGRESS NOTES
"  Occupational Therapy  Occupational Therapy Treatment    Patient Name: Eva Roca  MRN: [id number]  : 1970  Today's Date: 2025  Time Calculation  Start Time: 1355  Stop Time: 1434  Time Calculation (min): 39 min    Today's Charges:  OT Therapeutic Procedures Time Entry  Therapeutic Activity Time Entry: 24  Therapeutic Exercise Time Entry: 15      Current Problem  Problem List Items Addressed This Visit    None  Visit Diagnoses         Codes    Coordination impairment     R27.8          Assessment  OT Assessment  OT Assessment Results: Decreased upper extremity range of motion, Decreased upper extremity strength, Decreased fine motor control, Decreased gross motor control, Decreased IADLs  Strengths: Ability to acquire knowledge, Attitude of self  Clinical Presentation: Stable and/or uncomplicated characteristics  Progress towards goals:  Improved sensation on back of L hand.   Patient tolerated treatment satisfactorily.  Patient reports she has had some increase in function. Notes on \"lazier\" days her arm has difficulty with function and coordination. Continuing with FM tasks.     Insurance  Payor: [employer] / Plan: [employer] / Product Type: *No Product type* /     Plan  Outpatient Plan  OT Plan: ;  Frequency: 1x/wk  Duration: 6 weeks  Onset Date: 24  Certification Period Start Date: 24  Certification Period End Date: 24  Number of Treatments Authorized: 9  Rehab Potential: Good  Plan of Care Agreement: Patient  Planned Interventions: Therapeutic Exercise (09936), Therapeutic Activity (67787), Self-Care/Home Management (76188), Manual Therapy (02479), Neuromuscular Re-education (62335), Kinesiotaping, Hot Packs, and Cold Packs     General  OT Last Visit  OT Received On: 25  Reason for Referral: hand weakness  Referred By: Dr. Adorno  Primary Language: English  Preferred Learning Style: auditory, kinesthetic, verbal, visual, " "written    Subjective  General Comment: \"I've been working with the tweezers the most.\" (the more active I am the easier it is to move. if I am having a lazy day my arm doesn't seem to want to work.)  Current condition since injury: Same   Performing HEP? Yes    Precautions  Precautions  Medical Precautions: No known precautions/limitation    Pain  Pain Assessment  Pain Assessment: 0-10  0-10 (Numeric) Pain Score: 3  As a result of session pt. Reported pain remained the same  End of session pain: 3/10    Treatment  This therapist instructed and demonstrated interventions to patient, patient completed the following under direct supervision of this therapist:  Therapeutic Exercise  Therapeutic Exercise Performed: Yes ROM and strengthening exercises completed this date. Pt. engaged in shoulder ROM stretches this date including shoulder rolls fwd/back x 15 reps, shoulder squeezes x 10 reps with 10 holds, neck lateral flexion x 10 reps with 5s holds, neck rotation x 10 reps with 5s holds, neck flexion/extension 10 reps, 5 holds. Neck rolls x10 reps. Digi flex 2x15 reps 7.0lbs. Patient tolerated well.   Therapeutic Activity  Therapeutic Activity Performed: Yes Utilized weighted clothes pins to increase digit coordination and pinch strength. Patient utilized green and black weighted clips (heavy) to place around digi web. Patient reported minimal difficulty with task but reported black clip was fatiguing. Patient then was instructed to remove Pom balls from container with black weighted clip and place onto open spots in the web increasing coordination requirements. Patient able to remove all poms with black clip; but hand became fatigued. Utilized red and tweezers to replace poms into container. Patient to continue with strengthening and coordination at home.     Education  Education  Individual(s) Educated: Patient  Education Provided: Anatomy & Physiology, Diagnosis & Precautions, POC discussed and agreed upon, Risk and " "benefits of OT discussed with patient or other  Risk and Benefits Discussed with Patient/Caregiver/Other: yes  Patient/Caregiver Demonstrated Understanding: yes  Plan of Care Discussed and Agreed Upon: yes  Patient Response to Education: Patient/Caregiver Verbalized Understanding of Information, Patient/Caregiver Performed Return Demonstration of Exercises/Activities, Patient/Caregiver Asked Appropriate Questions    HEP Provided Today: No    Goals  Active       OT Goals       Pt. will decrease QuickDASH score by 8 points to show improved ability to participate in daily tasks with minimal difficulty/pain.  (Not Progressing)       Start:  11/07/24    Expected End:  01/31/25       QuickDASH:41 raw = 68.18      Goal Note       12/16: 46 raw =79.55              Pt. will demo increased BUE fine motor ability as evidenced by performance on 9 hole peg 30s.  (Not Progressing)       Start:  11/07/24    Expected End:  01/31/25         Goal Note       12/16: RUE: 52.3; LUE: 42.5              Pt. will be able to perform self-care, household, work, and or leisure tasks with 0-2/10 pain rating, 100% of the time  (Met)       Start:  11/07/24    Expected End:  11/29/24    Resolved:  12/16/24      Goal Note       12/16: yes with UE; now that I have this leg problem its an issue               Pt. will demonstrate MI with stating and demonstrating home exercise program in order to improve patient's ability to perform self-care, household, and/or leisure tasks.  (Progressing)       Start:  11/07/24    Expected End:  01/03/25         Goal Note       12/16: not currently doing it because of new LE injury \"I'm getting back to it.\"              Patient will endure throughout therapy sessions x45 minutes without complaint of shoulder/neck discomfort/fatigue to perform work tasks.  (Met)       Start:  11/07/24    Expected End:  12/20/24    Resolved:  12/16/24      Goal Note       12/16: \"I feel good when I walk out of here.\"              Pt. " will increase RUE hand strength to increase participation in self-care, household, and leisure tasks.  41 lbs;         Start:  12/16/24    Expected End:  01/31/25                    Ami Felix OTR/L

## 2025-01-14 ENCOUNTER — APPOINTMENT (OUTPATIENT)
Dept: OCCUPATIONAL THERAPY | Facility: HOSPITAL | Age: 55
End: 2025-01-14
Payer: MEDICAID

## 2025-01-14 ENCOUNTER — DOCUMENTATION (OUTPATIENT)
Dept: OCCUPATIONAL THERAPY | Facility: HOSPITAL | Age: 55
End: 2025-01-14
Payer: MEDICAID

## 2025-01-14 NOTE — PROGRESS NOTES
Occupational Therapy   Therapy Communication Note    Patient Name: Eva Roca  MRN: [id number]  Department:   Room: Room/bed info not found  Today's Date: 1/14/2025     Discipline: Occupational Therapy    Missed Time: Cancel    Comment: Patient canceled therapy appt today via reminder system.

## 2025-01-21 ENCOUNTER — TREATMENT (OUTPATIENT)
Dept: OCCUPATIONAL THERAPY | Facility: HOSPITAL | Age: 55
End: 2025-01-21
Payer: MEDICAID

## 2025-01-21 DIAGNOSIS — R27.8 COORDINATION IMPAIRMENT: ICD-10-CM

## 2025-01-21 PROCEDURE — 97110 THERAPEUTIC EXERCISES: CPT | Mod: GO | Performed by: OCCUPATIONAL THERAPIST

## 2025-01-21 PROCEDURE — 97530 THERAPEUTIC ACTIVITIES: CPT | Mod: GO | Performed by: OCCUPATIONAL THERAPIST

## 2025-01-21 ASSESSMENT — PAIN - FUNCTIONAL ASSESSMENT: PAIN_FUNCTIONAL_ASSESSMENT: 0-10

## 2025-01-21 ASSESSMENT — PAIN SCALES - GENERAL: PAINLEVEL_OUTOF10: 4

## 2025-01-21 NOTE — PROGRESS NOTES
Occupational Therapy  Occupational Therapy Treatment    Patient Name: Eva Roca  MRN: [id number]  : 1970  Today's Date: 2025  Time Calculation  Start Time: 1304  Stop Time: 1344  Time Calculation (min): 40 min    Today's Charges:     OT Therapeutic Procedures Time Entry  Therapeutic Activity Time Entry: 24  Therapeutic Exercise Time Entry: 16    Current Problem  Problem List Items Addressed This Visit             ICD-10-CM    Coordination impairment R27.8     Assessment  OT Assessment  OT Assessment Results: Decreased upper extremity range of motion, Decreased upper extremity strength, Decreased fine motor control, Decreased gross motor control, Decreased IADLs  Strengths: Ability to acquire knowledge, Attitude of self  Clinical Presentation: Stable and/or uncomplicated characteristics  Progress towards goals:  Improved coordination.  Improved ROM. Improved grasp.   Patient tolerated treatment satisfactorily.  Patient has complained of continued paresthesias in hands and mid section. Discussed return to surgeon for continued problems. Patient verbalized understanding. Planning to return to work next week. Overall coordination is improving but still having trouble with consistent coordination.     Insurance  Payor: [employer] / Plan: [employer] / Product Type: *No Product type* /     Plan  Outpatient Plan  OT Plan: ; recheck, weightbearing  Frequency: 1x/wk  Duration: 6 weeks  Onset Date: 24  Certification Period Start Date: 24  Certification Period End Date: 24  Number of Treatments Authorized: 9  Rehab Potential: Good  Plan of Care Agreement: Patient  Planned Interventions: Therapeutic Exercise (92359), Therapeutic Activity (85673), Self-Care/Home Management (91463), Manual Therapy (94311), Neuromuscular Re-education (00594), Kinesiotaping, Hot Packs, and Cold Packs     General  OT Last Visit  OT Received On: 25  Reason for  "Referral: hand weakness  Referred By: Dr. Adorno  Primary Language: English  Preferred Learning Style: auditory, kinesthetic, verbal, visual, written    Subjective  General Comment: \"I'm still having numbness in my hand but it has improved on the backside of my hand.\"  Current condition since injury: Better   Performing HEP? Yes    Precautions  Precautions  Medical Precautions: No known precautions/limitation    Pain  Pain Assessment  Pain Assessment: 0-10  0-10 (Numeric) Pain Score: 4  As a result of session pt. Reported pain remained the same  End of session pain: 4/10    Treatment  This therapist instructed and demonstrated interventions to patient, patient completed the following under direct supervision of this therapist:  Therapeutic Exercise  Therapeutic Exercise Performed: Yes ROM and strengthening exercises completed this date. Pt. engaged in shoulder ROM stretches this date including shoulder rolls fwd/back x 10 reps, neck rotation x 5 reps with 10s holds.  7# Digiflex 2x 20 reps with full finger flexion. Finger isolation and opposition x10 reps with 7# digi flex.     Therapeutic Activity  Therapeutic Activity Performed: Yes Pom removal with weighted clothes pin (blue). Noted increased difficulty with opening and closing clothes pins today. Patient displayed increased coordination and less \"stabbing\" at the poms. Replaced poms to bin with lighter clothes pin (green). Patient reported fatigue in hand following. Putty bead removal completed this date with opposition to work on hand strength and fine motor movements. Pt tolerated well using pink putty. Pt. able to remove the beads and replace them with maximal difficulty d/t finger isolation.    Education  Education  Individual(s) Educated: Patient  Education Provided: Anatomy & Physiology, Diagnosis & Precautions, POC discussed and agreed upon, Risk and benefits of OT discussed with patient or other  Risk and Benefits Discussed with Patient/Caregiver/Other: " "yes  Patient/Caregiver Demonstrated Understanding: yes  Plan of Care Discussed and Agreed Upon: yes  Patient Response to Education: Patient/Caregiver Verbalized Understanding of Information, Patient/Caregiver Performed Return Demonstration of Exercises/Activities, Patient/Caregiver Asked Appropriate Questions    HEP Provided Today: No      Goals  Active       OT Goals       Pt. will decrease QuickDASH score by 8 points to show improved ability to participate in daily tasks with minimal difficulty/pain.  (Not Progressing)       Start:  11/07/24    Expected End:  01/31/25       QuickDASH:41 raw = 68.18      Goal Note       12/16: 46 raw =79.55              Pt. will demo increased BUE fine motor ability as evidenced by performance on 9 hole peg 30s.  (Not Progressing)       Start:  11/07/24    Expected End:  01/31/25         Goal Note       12/16: RUE: 52.3; LUE: 42.5              Pt. will be able to perform self-care, household, work, and or leisure tasks with 0-2/10 pain rating, 100% of the time  (Met)       Start:  11/07/24    Expected End:  11/29/24    Resolved:  12/16/24      Goal Note       12/16: yes with UE; now that I have this leg problem its an issue               Pt. will demonstrate MI with stating and demonstrating home exercise program in order to improve patient's ability to perform self-care, household, and/or leisure tasks.  (Progressing)       Start:  11/07/24    Expected End:  01/03/25         Goal Note       12/16: not currently doing it because of new LE injury \"I'm getting back to it.\"              Patient will endure throughout therapy sessions x45 minutes without complaint of shoulder/neck discomfort/fatigue to perform work tasks.  (Met)       Start:  11/07/24    Expected End:  12/20/24    Resolved:  12/16/24      Goal Note       12/16: \"I feel good when I walk out of here.\"              Pt. will increase RUE hand strength to increase participation in self-care, household, and leisure tasks. "  41 lbs;         Start:  12/16/24    Expected End:  01/31/25                    Ami Felix, OTR/L

## 2025-01-28 ENCOUNTER — TREATMENT (OUTPATIENT)
Dept: OCCUPATIONAL THERAPY | Facility: HOSPITAL | Age: 55
End: 2025-01-28
Payer: MEDICAID

## 2025-01-28 DIAGNOSIS — R27.8 COORDINATION IMPAIRMENT: ICD-10-CM

## 2025-01-28 PROCEDURE — 97530 THERAPEUTIC ACTIVITIES: CPT | Mod: GO | Performed by: OCCUPATIONAL THERAPIST

## 2025-01-28 PROCEDURE — 97110 THERAPEUTIC EXERCISES: CPT | Mod: GO | Performed by: OCCUPATIONAL THERAPIST

## 2025-01-28 ASSESSMENT — PAIN - FUNCTIONAL ASSESSMENT: PAIN_FUNCTIONAL_ASSESSMENT: 0-10

## 2025-01-28 ASSESSMENT — PAIN SCALES - GENERAL: PAINLEVEL_OUTOF10: 3

## 2025-01-28 NOTE — LETTER
January 28, 2025    ROVERTO Cantu  [address] [facility] [address]    Patient: Eva Roca   YOB: 1970   Date of Visit: 1/28/2025       Dear ROVERTO Cantu  [address] [place] [address]    The attached plan of care is being sent to you because your patient’s medical reimbursement requires that you certify the plan of care. Your signature is required to allow uninterrupted insurance coverage.      You may indicate your approval by signing below and faxing this form back to us at Dept Fax: [phone].    Please call Dept: [phone] with any questions or concerns.    Thank you for this referral,        mAi Felix OT  [place] [id number] [facility] [address]  [phone]    Payer: Payor: [employer] / Plan: [employer] / Product Type: *No Product type* /                                                                         Date:     Dear Ami Felix OT,     Re: Ms. Eva Roca, MRN:[id number]    I certify that I have reviewed the attached plan of care and it is medically necessary for Ms. Eva Roca (1970) who is under my care.          ______________________________________                    _________________  Provider name and credentials                                           Date and time                                                                                           Plan of Care 2/1/25   Effective from: 2/1/2025  Effective to: 3/14/2025    Plan ID: [id number]            Participants as of Finalize on 1/28/2025    Name Type Comments Contact Info    ROVERTO Cantu PCP - General  [phone]    Ami Felix OT Occupational Therapist  [phone]       Last Plan Note     Author: Ami Felix OT Status: Sign when Signing Visit Last edited: 1/28/2025  9:15 AM         Occupational Therapy  Occupational Therapy  Treatment/Re-check    Patient Name: Eva Roca  MRN: [id number]  : 1970  Today's Date: 2025  Time Calculation  Start Time: 918  Stop Time:   Time Calculation (min): 47 min    Today's Charges:  OT Therapeutic Procedures Time Entry  Therapeutic Activity Time Entry: 20  Therapeutic Exercise Time Entry: 27    Current Problem  Problem List Items Addressed This Visit             ICD-10-CM    Coordination impairment R27.8     Assessment  OT Assessment  OT Assessment Results: Decreased upper extremity range of motion, Decreased upper extremity strength, Decreased fine motor control, Decreased gross motor control, Decreased IADLs  Strengths: Ability to acquire knowledge, Attitude of self  Clinical Presentation: Stable and/or uncomplicated characteristics  Progress towards goals:  Improved coordination, Improved ROM. Improved strength.  Patient tolerated treatment satisfactorily.  Patient displays improved  strength and coordination. 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 Patient reports she will continue with HEP. Will continue with coordination training 1x/wk for 6 wks at this time.     Insurance  Payor: [employer] / Plan: [employer] MEDICAID / Product Type: *No Product type* /     Plan  Outpatient Plan  OT Plan: ;  Frequency: 1x/wk  Duration: 6 weeks  Onset Date: 24  Certification Period Start Date: 24  Certification Period End Date: 25  Number of Treatments Authorized: 13 (30v/year)  Rehab Potential: Good  Plan of Care Agreement: Patient  Planned Interventions: Therapeutic Exercise (85964), Therapeutic Activity (08155), Self-Care/Home Management (08807), Manual Therapy (13382), Neuromuscular Re-education (72071), Kinesiotaping, Hot Packs, and Cold Packs     General  OT Last Visit  OT Received On: 25  Reason for Referral: hand weakness  Referred By: Dr. Tila Tidwell  "Language: English  Preferred Learning Style: auditory, kinesthetic, verbal, visual, written    Subjective  General Comment: \"I can't seem to resolve the numbness.\"  Current condition since injury: Better   Performing HEP? Yes    Precautions  Precautions  Medical Precautions: No known precautions/limitation    Pain  Pain Assessment  Pain Assessment: 0-10  0-10 (Numeric) Pain Score: 3  As a result of session pt. Reported pain remained the same  End of session pain: 3/10    ROM/Strength  Right Hand Strength -  (lbs)  Handle Setting 2 (lbs): 40.67 lbs (38, 44, 40)     Left Hand Strength -  (lbs)  Handle Setting 2 (lbs): 45 lbs (50, 40, 45)    Treatment  This therapist instructed and demonstrated interventions to patient, patient completed the following under direct supervision of this therapist:  Therapeutic Exercise  Therapeutic Exercise Performed: Yes ROM and strengthening exercises completed this date. Hand pumping x10 reps. Finger taps, hook hand x10 reps. Patient engaged in carpal tunnel exercises today x4 reps with holds of 10 bilaterally. Wall push ups x10, single hand wall push up with twist x10. Patient reported these to be fatiguing. Carpal tunnel exercises provided today.     Therapeutic Activity  Therapeutic Activity Performed: Yes Re-eval completed this date. QuickDASH completed. Goals discussed. Strength/ROM and fine motor measurements taken. Patient progressed towards goals minimally. Patient reports continued difficulty with hair and coordination. Patient still has numbness in hands as well. Patient is making slow but steady progress. Will continue 1x/wk for 6wks.     Education  Education  Individual(s) Educated: Patient  Education Provided: Anatomy & Physiology, Diagnosis & Precautions, POC discussed and agreed upon, Risk and benefits of OT discussed with patient or other  Risk and Benefits Discussed with Patient/Caregiver/Other: yes  Patient/Caregiver Demonstrated Understanding: yes  Plan of " "Care Discussed and Agreed Upon: yes  Patient Response to Education: Patient/Caregiver Verbalized Understanding of Information, Patient/Caregiver Performed Return Demonstration of Exercises/Activities, Patient/Caregiver Asked Appropriate Questions    HEP Provided Today: No    Outcome Measures  OT Adult Other Outcome Measures  9 Hole Peg Test: RUE: 48.7; LUE: 39.2  Other Outcome Measures: QuickDASH: 35 raw = 54.55    Goals  Active       OT Goals       Pt. will decrease QuickDASH score by 8 points to show improved ability to participate in daily tasks with minimal difficulty/pain.  (Met)       Start:  11/07/24    Expected End:  01/31/25    Resolved:  01/28/25    QuickDASH:41 raw = 68.18      Goal Note       1/28: 35 raw = 54.55              Pt. will demo increased BUE fine motor ability as evidenced by performance on 9 hole peg 30s.  (Progressing)       Start:  11/07/24    Expected End:  01/31/25         Goal Note       RUE: 48.7s              Pt. will be able to perform self-care, household, work, and or leisure tasks with 0-2/10 pain rating, 100% of the time  (Met)       Start:  11/07/24    Expected End:  11/29/24    Resolved:  12/16/24      Goal Note       12/16: yes with UE; now that I have this leg problem its an issue               Pt. will demonstrate MI with stating and demonstrating home exercise program in order to improve patient's ability to perform self-care, household, and/or leisure tasks.  (Met)       Start:  11/07/24    Expected End:  01/03/25    Resolved:  01/28/25      Goal Note       1/28: Patient reports she is completing.               Patient will endure throughout therapy sessions x45 minutes without complaint of shoulder/neck discomfort/fatigue to perform work tasks.  (Met)       Start:  11/07/24    Expected End:  12/20/24    Resolved:  12/16/24      Goal Note       12/16: \"I feel good when I walk out of here.\"              Pt. will increase RUE hand strength to increase participation in " self-care, household, and leisure tasks.  41 lbs;   (Not met)       Start:  12/16/24    Expected End:  01/31/25    Resolved:  01/28/25    Updated to: Pt. will increase RUE hand strength to increase participation in self-care, household, and leisure tasks.  45 lbs;    Update reason: close to meeting           Goal Note       1/28: 40.67              Pt. will increase RUE hand strength to increase participation in self-care, household, and leisure tasks.  45 lbs;       Start:  01/28/25    Expected End:  01/31/25                    JOSE Smith/TEA         Current Participants as of 1/28/2025    Name Type Comments Contact Info    ROVERTO Cantu PCP - General  [phone]    Signature pending    Ami Felix OT Occupational Therapist  [phone]    Signature pending

## 2025-01-28 NOTE — PROGRESS NOTES
Occupational Therapy  Occupational Therapy Treatment/Re-check    Patient Name: Eva Roca  MRN: [id number]  : 1970  Today's Date: 2025  Time Calculation  Start Time: 918  Stop Time: 100  Time Calculation (min): 47 min    Today's Charges:  OT Therapeutic Procedures Time Entry  Therapeutic Activity Time Entry: 20  Therapeutic Exercise Time Entry: 27    Current Problem  Problem List Items Addressed This Visit             ICD-10-CM    Coordination impairment R27.8     Assessment  OT Assessment  OT Assessment Results: Decreased upper extremity range of motion, Decreased upper extremity strength, Decreased fine motor control, Decreased gross motor control, Decreased IADLs  Strengths: Ability to acquire knowledge, Attitude of self  Clinical Presentation: Stable and/or uncomplicated characteristics  Progress towards goals:  Improved coordination, Improved ROM. Improved strength.  Patient tolerated treatment satisfactorily.  Patient displays improved  strength and coordination. 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 Patient reports she will continue with HEP. Will continue with coordination training 1x/wk for 6 wks at this time.     Insurance  Payor: [employer] / Plan: [employer] / Product Type: *No Product type* /     Plan  Outpatient Plan  OT Plan: ;  Frequency: 1x/wk  Duration: 6 weeks  Onset Date: 24  Certification Period Start Date: 24  Certification Period End Date: 25  Number of Treatments Authorized: 13 (30v/year)  Rehab Potential: Good  Plan of Care Agreement: Patient  Planned Interventions: Therapeutic Exercise (30991), Therapeutic Activity (73788), Self-Care/Home Management (00590), Manual Therapy (37682), Neuromuscular Re-education (17984), Kinesiotaping, Hot Packs, and Cold Packs     General  OT Last Visit  OT Received On: 25  Reason for Referral: hand  "weakness  Referred By: Dr. Adorno  Primary Language: English  Preferred Learning Style: auditory, kinesthetic, verbal, visual, written    Subjective  General Comment: \"I can't seem to resolve the numbness.\"  Current condition since injury: Better   Performing HEP? Yes    Precautions  Precautions  Medical Precautions: No known precautions/limitation    Pain  Pain Assessment  Pain Assessment: 0-10  0-10 (Numeric) Pain Score: 3  As a result of session pt. Reported pain remained the same  End of session pain: 3/10    ROM/Strength  Right Hand Strength -  (lbs)  Handle Setting 2 (lbs): 40.67 lbs (38, 44, 40)     Left Hand Strength -  (lbs)  Handle Setting 2 (lbs): 45 lbs (50, 40, 45)    Treatment  This therapist instructed and demonstrated interventions to patient, patient completed the following under direct supervision of this therapist:  Therapeutic Exercise  Therapeutic Exercise Performed: Yes ROM and strengthening exercises completed this date. Hand pumping x10 reps. Finger taps, hook hand x10 reps. Patient engaged in carpal tunnel exercises today x4 reps with holds of 10 bilaterally. Wall push ups x10, single hand wall push up with twist x10. Patient reported these to be fatiguing. Carpal tunnel exercises provided today.     Therapeutic Activity  Therapeutic Activity Performed: Yes Re-eval completed this date. QuickDASH completed. Goals discussed. Strength/ROM and fine motor measurements taken. Patient progressed towards goals minimally. Patient reports continued difficulty with hair and coordination. Patient still has numbness in hands as well. Patient is making slow but steady progress. Will continue 1x/wk for 6wks.     Education  Education  Individual(s) Educated: Patient  Education Provided: Anatomy & Physiology, Diagnosis & Precautions, POC discussed and agreed upon, Risk and benefits of OT discussed with patient or other  Risk and Benefits Discussed with Patient/Caregiver/Other: yes  Patient/Caregiver " "Demonstrated Understanding: yes  Plan of Care Discussed and Agreed Upon: yes  Patient Response to Education: Patient/Caregiver Verbalized Understanding of Information, Patient/Caregiver Performed Return Demonstration of Exercises/Activities, Patient/Caregiver Asked Appropriate Questions    HEP Provided Today: No    Outcome Measures  OT Adult Other Outcome Measures  9 Hole Peg Test: RUE: 48.7; LUE: 39.2  Other Outcome Measures: QuickDASH: 35 raw = 54.55    Goals  Active       OT Goals       Pt. will decrease QuickDASH score by 8 points to show improved ability to participate in daily tasks with minimal difficulty/pain.  (Met)       Start:  11/07/24    Expected End:  01/31/25    Resolved:  01/28/25    QuickDASH:41 raw = 68.18      Goal Note       1/28: 35 raw = 54.55              Pt. will demo increased BUE fine motor ability as evidenced by performance on 9 hole peg 30s.  (Progressing)       Start:  11/07/24    Expected End:  01/31/25         Goal Note       RUE: 48.7s              Pt. will be able to perform self-care, household, work, and or leisure tasks with 0-2/10 pain rating, 100% of the time  (Met)       Start:  11/07/24    Expected End:  11/29/24    Resolved:  12/16/24      Goal Note       12/16: yes with UE; now that I have this leg problem its an issue               Pt. will demonstrate MI with stating and demonstrating home exercise program in order to improve patient's ability to perform self-care, household, and/or leisure tasks.  (Met)       Start:  11/07/24    Expected End:  01/03/25    Resolved:  01/28/25      Goal Note       1/28: Patient reports she is completing.               Patient will endure throughout therapy sessions x45 minutes without complaint of shoulder/neck discomfort/fatigue to perform work tasks.  (Met)       Start:  11/07/24    Expected End:  12/20/24    Resolved:  12/16/24      Goal Note       12/16: \"I feel good when I walk out of here.\"              Pt. will increase RUE hand " strength to increase participation in self-care, household, and leisure tasks.  41 lbs;   (Not met)       Start:  12/16/24    Expected End:  01/31/25    Resolved:  01/28/25    Updated to: Pt. will increase RUE hand strength to increase participation in self-care, household, and leisure tasks.  45 lbs;    Update reason: close to meeting           Goal Note       1/28: 40.67              Pt. will increase RUE hand strength to increase participation in self-care, household, and leisure tasks.  45 lbs;       Start:  01/28/25    Expected End:  01/31/25                    JOSE Smith/TEA

## 2025-02-05 ENCOUNTER — TREATMENT (OUTPATIENT)
Dept: OCCUPATIONAL THERAPY | Facility: HOSPITAL | Age: 55
End: 2025-02-05
Payer: MEDICAID

## 2025-02-05 DIAGNOSIS — R27.8 COORDINATION IMPAIRMENT: ICD-10-CM

## 2025-02-05 PROCEDURE — 97110 THERAPEUTIC EXERCISES: CPT | Mod: GO | Performed by: OCCUPATIONAL THERAPIST

## 2025-02-05 ASSESSMENT — PAIN - FUNCTIONAL ASSESSMENT: PAIN_FUNCTIONAL_ASSESSMENT: 0-10

## 2025-02-05 ASSESSMENT — PAIN SCALES - GENERAL: PAINLEVEL_OUTOF10: 5 - MODERATE PAIN

## 2025-02-05 NOTE — PROGRESS NOTES
Occupational Therapy  Occupational Therapy Treatment    Patient Name: Eva Roca  MRN: [id number]  : 1970  Today's Date: 2025  Time Calculation  Start Time: 1135  Stop Time: 1215  Time Calculation (min): 40 min    Today's Charges:  OT Therapeutic Procedures Time Entry  Therapeutic Exercise Time Entry: 40      Current Problem  Problem List Items Addressed This Visit             ICD-10-CM    Coordination impairment R27.8     Assessment  OT Assessment  OT Assessment Results: Decreased upper extremity range of motion, Decreased upper extremity strength, Decreased fine motor control, Decreased gross motor control, Decreased IADLs  Strengths: Ability to acquire knowledge, Attitude of self  Clinical Presentation: Stable and/or uncomplicated characteristics  Progress towards goals:  Improved coordination Improved ROM. Improved strength.  Patient tolerated treatment satisfactorily.  Patient reports increased tightness in UEs today. Series of stretches performed with positive outcomes. Patient encouraged to continue stretching at home.     Insurance  Payor: [employer] / Plan: [employer] / Product Type: *No Product type* /     Plan  Outpatient Plan  OT Plan: ; shoulder exercises  Frequency: 1x/wk  Duration: 6 weeks  Onset Date: 24  Certification Period Start Date: 24  Certification Period End Date: 25  Number of Treatments Authorized: 13 (30v/year)  Rehab Potential: Good  Plan of Care Agreement: Patient  Planned Interventions: Therapeutic Exercise (17761), Therapeutic Activity (66684), Self-Care/Home Management (19126), Manual Therapy (17418), Neuromuscular Re-education (01627), Kinesiotaping, Hot Packs, and Cold Packs     General  OT Last Visit  OT Received On: 25  Reason for Referral: hand weakness  Referred By: Dr. Adorno  Primary Language: English  Preferred Learning Style: auditory, kinesthetic, verbal, visual,  "written    Subjective  General Comment: \"my arms feel heavy.\"  Current condition since injury: Better   Performing HEP? Yes    Precautions  Precautions  Medical Precautions: No known precautions/limitation    Pain  Pain Assessment  Pain Assessment: 0-10  0-10 (Numeric) Pain Score: 5 - Moderate pain  As a result of session pt. Reported pain reduced  End of session pain: 3/10    Treatment  This therapist instructed and demonstrated interventions to patient, patient completed the following under direct supervision of this therapist:  Therapeutic Exercise  Therapeutic Exercise Performed: Yes ROM and strengthening exercises completed this date. Engaged in shoulder exercises today d/t patient complaint of tightness in shoulder and neck making her arm feel heavy. Seated shoulder abduction, horizontal abduction, flexion x10 reps with 10s holds. Pt. engaged in shoulder ROM stretches this date including shoulder rolls fwd/back x 10 reps, shoulder squeezes x 10 reps with 6 holds, neck lateral flexion x 10 reps with 5 holds, neck rotation x 10 reps with 5 holds, neck flexion/extension 10 reps, 2-3 holds. Engaged in large arm circles, forward/back, and abduction x10 reps. Series of pectoral stretchs with arm on positioned on doorway x10 with 10s holds with forward lean and ER. Wall walks x10 with 10s holds. Patient tolerated all well. Wall walks on ladder x5. Stated she felt better following the exercises.     Education  Education  Individual(s) Educated: Patient  Education Provided: Anatomy & Physiology, Diagnosis & Precautions, POC discussed and agreed upon, Risk and benefits of OT discussed with patient or other  Risk and Benefits Discussed with Patient/Caregiver/Other: yes  Patient/Caregiver Demonstrated Understanding: yes  Plan of Care Discussed and Agreed Upon: yes  Patient Response to Education: Patient/Caregiver Verbalized Understanding of Information, Patient/Caregiver Performed Return Demonstration of " "Exercises/Activities, Patient/Caregiver Asked Appropriate Questions    HEP Provided Today: No    Goals  Active       OT Goals       Pt. will decrease QuickDASH score by 8 points to show improved ability to participate in daily tasks with minimal difficulty/pain.  (Met)       Start:  11/07/24    Expected End:  01/31/25    Resolved:  01/28/25    QuickDASH:41 raw = 68.18      Goal Note       1/28: 35 raw = 54.55              Pt. will demo increased BUE fine motor ability as evidenced by performance on 9 hole peg 30s.  (Progressing)       Start:  11/07/24    Expected End:  01/31/25         Goal Note       RUE: 48.7s              Pt. will be able to perform self-care, household, work, and or leisure tasks with 0-2/10 pain rating, 100% of the time  (Met)       Start:  11/07/24    Expected End:  11/29/24    Resolved:  12/16/24      Goal Note       12/16: yes with UE; now that I have this leg problem its an issue               Pt. will demonstrate MI with stating and demonstrating home exercise program in order to improve patient's ability to perform self-care, household, and/or leisure tasks.  (Met)       Start:  11/07/24    Expected End:  01/03/25    Resolved:  01/28/25      Goal Note       1/28: Patient reports she is completing.               Patient will endure throughout therapy sessions x45 minutes without complaint of shoulder/neck discomfort/fatigue to perform work tasks.  (Met)       Start:  11/07/24    Expected End:  12/20/24    Resolved:  12/16/24      Goal Note       12/16: \"I feel good when I walk out of here.\"              Pt. will increase RUE hand strength to increase participation in self-care, household, and leisure tasks.  41 lbs;   (Not met)       Start:  12/16/24    Expected End:  01/31/25    Resolved:  01/28/25    Updated to: Pt. will increase RUE hand strength to increase participation in self-care, household, and leisure tasks.  45 lbs;    Update reason: close to meeting           Goal Note  "      1/28: 40.67              Pt. will increase RUE hand strength to increase participation in self-care, household, and leisure tasks.  45 lbs;       Start:  01/28/25    Expected End:  01/31/25                    JOSE Smith/TEA

## 2025-02-12 ENCOUNTER — APPOINTMENT (OUTPATIENT)
Dept: OCCUPATIONAL THERAPY | Facility: HOSPITAL | Age: 55
End: 2025-02-12
Payer: MEDICAID

## 2025-02-12 ENCOUNTER — DOCUMENTATION (OUTPATIENT)
Dept: OCCUPATIONAL THERAPY | Facility: HOSPITAL | Age: 55
End: 2025-02-12
Payer: MEDICAID

## 2025-02-12 NOTE — PROGRESS NOTES
Occupational Therapy   Therapy Communication Note    Patient Name: Eva Roca  MRN: [id number]  Department:   Room: Room/bed info not found  Today's Date: 2/12/2025     Discipline: Occupational Therapy    Missed Time: Cancel    Comment: Patient canceled and rescheduled therapy appt today.

## 2025-02-13 ENCOUNTER — DOCUMENTATION (OUTPATIENT)
Dept: OCCUPATIONAL THERAPY | Facility: HOSPITAL | Age: 55
End: 2025-02-13

## 2025-02-13 ENCOUNTER — APPOINTMENT (OUTPATIENT)
Dept: OCCUPATIONAL THERAPY | Facility: HOSPITAL | Age: 55
End: 2025-02-13
Payer: MEDICAID

## 2025-02-13 NOTE — PROGRESS NOTES
Occupational Therapy  Therapy Communication Note    Patient Name: Eva Roca  MRN: [id number]  Department:   Room: Room/bed info not found  Today's Date: 2/13/2025     Discipline: Occupational Therapy    Missed Time: Cancel    Comment: Pt canceled today d/t weather and work.

## 2025-02-19 ENCOUNTER — APPOINTMENT (OUTPATIENT)
Dept: OCCUPATIONAL THERAPY | Facility: HOSPITAL | Age: 55
End: 2025-02-19
Payer: MEDICAID

## 2025-02-19 ENCOUNTER — DOCUMENTATION (OUTPATIENT)
Dept: OCCUPATIONAL THERAPY | Facility: HOSPITAL | Age: 55
End: 2025-02-19
Payer: MEDICAID

## 2025-02-19 NOTE — PROGRESS NOTES
Occupational Therapy  Therapy Communication Note    Patient Name: Eva Roca  MRN: [id number]  Department:   Room: Room/bed info not found  Today's Date: 2/19/2025     Discipline: Occupational Therapy    Missed Time: Cancel    Patient called to cancel OT appt without reason given.

## 2025-02-26 ENCOUNTER — APPOINTMENT (OUTPATIENT)
Dept: OCCUPATIONAL THERAPY | Facility: HOSPITAL | Age: 55
End: 2025-02-26
Payer: MEDICAID

## 2025-02-27 ENCOUNTER — TREATMENT (OUTPATIENT)
Dept: OCCUPATIONAL THERAPY | Facility: HOSPITAL | Age: 55
End: 2025-02-27
Payer: MEDICAID

## 2025-02-27 DIAGNOSIS — R27.8 COORDINATION IMPAIRMENT: Primary | ICD-10-CM

## 2025-02-27 PROCEDURE — 97110 THERAPEUTIC EXERCISES: CPT | Mod: GO | Performed by: OCCUPATIONAL THERAPIST

## 2025-02-27 ASSESSMENT — PAIN - FUNCTIONAL ASSESSMENT: PAIN_FUNCTIONAL_ASSESSMENT: 0-10

## 2025-02-27 ASSESSMENT — PAIN SCALES - GENERAL: PAINLEVEL_OUTOF10: 5 - MODERATE PAIN

## 2025-02-27 NOTE — PROGRESS NOTES
"  Occupational Therapy  Occupational Therapy Treatment    Patient Name: Eva Roca  MRN: [id number]  : 1970  Today's Date: 2025  Time Calculation  Start Time: 1136  Stop Time: 1215  Time Calculation (min): 39 min    Today's Charges:  OT Therapeutic Procedures Time Entry  Therapeutic Exercise Time Entry: 39    Total Timed Minutes: 39  Total Untimed Minutes: 0      Current Problem  Problem List Items Addressed This Visit             ICD-10-CM    Coordination impairment - Primary R27.8     Assessment  OT Assessment  OT Assessment Results: Decreased upper extremity range of motion, Decreased upper extremity strength, Decreased fine motor control, Decreased gross motor control, Decreased IADLs  Clinical Presentation: Stable and/or uncomplicated characteristics  Progress towards goals: Improved ROM.  Patient tolerated treatment satisfactorily.  Patient reports most difficulty with lifting arms overhead 2/2 weakness and decreased coordination.  Patient has returned to work but reports she is not getting hours.      Insurance  Payor: [employer] / Plan: [employer] / Product Type: *No Product type* /     Plan  Outpatient Plan  OT Plan: ;  Frequency: 1x/wk  Duration: 6 weeks  Onset Date: 24  Certification Period Start Date: 24  Certification Period End Date: 25  Number of Treatments Authorized: 13 (30v/year)  Rehab Potential: Good  Plan of Care Agreement: Patient  Planned Interventions: Therapeutic Exercise (23521), Therapeutic Activity (10983), Self-Care/Home Management (95340), Manual Therapy (41026), Neuromuscular Re-education (56405), Kinesiotaping, Hot Packs, and Cold Packs     General  OT Last Visit  OT Received On: 25  Reason for Referral: hand weakness  Referred By: Dr. Adorno  Primary Language: English  Preferred Learning Style: auditory, kinesthetic, verbal, visual, written    Subjective  General Comment: \"my arms feel " "heavy.\"  Current condition since injury: Better   Performing HEP? Yes    Precautions  Precautions  Medical Precautions: No known precautions/limitation    Pain  Pain Assessment  Pain Assessment: 0-10  0-10 (Numeric) Pain Score: 5 - Moderate pain  Pain Type: Acute pain  Pain Location: Hand (shoulder arms)  Pain Orientation: Right, Left  As a result of session pt. Reported pain remained the same  End of session pain: 5/10    Treatment  This therapist instructed and demonstrated interventions to patient, patient completed the following under direct supervision of this therapist:  Therapeutic Exercise  Therapeutic Exercise Performed: Yes ROM and strengthening exercises completed this date. Attempted shoulder exercises with 2 and 3 # weights but patient was unsuccessful with this task. Patient engaged in abduction with forward flexion alternating x10 reps unweighted. Patient very uncoordinated with task. Alternating shoulder flexion with opposite UE drop, shoulder press alternating, elbow flexion with isometric hold, wrist flexion/extension in shoulder flexion 2x10 reps. Hand pumps completed x10 reps today. Utilized red and green flex bar to complete vertical and horizontal wrist flex/ext, hand  and twist, thumb ab/adduction, and sup/pro 2x10 reps.     Education  Education  Individual(s) Educated: Patient  Education Provided: Anatomy & Physiology, Diagnosis & Precautions, POC discussed and agreed upon, Risk and benefits of OT discussed with patient or other  Risk and Benefits Discussed with Patient/Caregiver/Other: yes  Patient/Caregiver Demonstrated Understanding: yes  Plan of Care Discussed and Agreed Upon: yes  Patient Response to Education: Patient/Caregiver Verbalized Understanding of Information, Patient/Caregiver Performed Return Demonstration of Exercises/Activities, Patient/Caregiver Asked Appropriate Questions    HEP Provided Today: No    Goals  Active       OT Goals       Pt. will decrease QuickDASH score " "by 8 points to show improved ability to participate in daily tasks with minimal difficulty/pain.  (Met)       Start:  11/07/24    Expected End:  01/31/25    Resolved:  01/28/25    QuickDASH:41 raw = 68.18      Goal Note       1/28: 35 raw = 54.55              Pt. will demo increased BUE fine motor ability as evidenced by performance on 9 hole peg 30s.  (Progressing)       Start:  11/07/24    Expected End:  01/31/25         Goal Note       RUE: 48.7s              Pt. will be able to perform self-care, household, work, and or leisure tasks with 0-2/10 pain rating, 100% of the time  (Met)       Start:  11/07/24    Expected End:  11/29/24    Resolved:  12/16/24      Goal Note       12/16: yes with UE; now that I have this leg problem its an issue               Pt. will demonstrate MI with stating and demonstrating home exercise program in order to improve patient's ability to perform self-care, household, and/or leisure tasks.  (Met)       Start:  11/07/24    Expected End:  01/03/25    Resolved:  01/28/25      Goal Note       1/28: Patient reports she is completing.               Patient will endure throughout therapy sessions x45 minutes without complaint of shoulder/neck discomfort/fatigue to perform work tasks.  (Met)       Start:  11/07/24    Expected End:  12/20/24    Resolved:  12/16/24      Goal Note       12/16: \"I feel good when I walk out of here.\"              Pt. will increase RUE hand strength to increase participation in self-care, household, and leisure tasks.  41 lbs;   (Not met)       Start:  12/16/24    Expected End:  01/31/25    Resolved:  01/28/25    Updated to: Pt. will increase RUE hand strength to increase participation in self-care, household, and leisure tasks.  45 lbs;    Update reason: close to meeting           Goal Note       1/28: 40.67              Pt. will increase RUE hand strength to increase participation in self-care, household, and leisure tasks.  45 lbs;       Start:  " 01/28/25    Expected End:  01/31/25                    Ami Felix OTRENNY/L

## 2025-03-05 ENCOUNTER — TREATMENT (OUTPATIENT)
Dept: OCCUPATIONAL THERAPY | Facility: HOSPITAL | Age: 55
End: 2025-03-05
Payer: MEDICAID

## 2025-03-05 DIAGNOSIS — R27.8 COORDINATION IMPAIRMENT: Primary | ICD-10-CM

## 2025-03-05 PROCEDURE — 97110 THERAPEUTIC EXERCISES: CPT | Mod: GO | Performed by: OCCUPATIONAL THERAPIST

## 2025-03-05 PROCEDURE — 97530 THERAPEUTIC ACTIVITIES: CPT | Mod: GO | Performed by: OCCUPATIONAL THERAPIST

## 2025-03-05 ASSESSMENT — PAIN - FUNCTIONAL ASSESSMENT: PAIN_FUNCTIONAL_ASSESSMENT: 0-10

## 2025-03-05 ASSESSMENT — PAIN SCALES - GENERAL: PAINLEVEL_OUTOF10: 4

## 2025-03-05 NOTE — PROGRESS NOTES
Occupational Therapy  Occupational Therapy Treatment    Patient Name: Eva Roca  MRN: [id number]  : 1970  Today's Date: 3/5/2025  Time Calculation  Start Time: 1133  Stop Time: 1215  Time Calculation (min): 42 min    Today's Charges:     OT Therapeutic Procedures Time Entry  Therapeutic Activity Time Entry: 27  Therapeutic Exercise Time Entry: 15    Total Timed Minutes: 42  Total Untimed Minutes: 0      Current Problem  Problem List Items Addressed This Visit             ICD-10-CM    Coordination impairment - Primary R27.8     Assessment  OT Assessment  OT Assessment Results: Decreased upper extremity range of motion, Decreased upper extremity strength, Decreased fine motor control, Decreased gross motor control, Decreased IADLs  Strengths: Ability to acquire knowledge, Attitude of self  Clinical Presentation: Stable and/or uncomplicated characteristics  Progress towards goals:  Improved coordination  Patient tolerated treatment satisfactorily.  Patient reports she is not ready to give up on her hands and has a neurosurgeon appt coming up this month. Patient's coordination has greatly improved through the course of therapy with her gaining strength.     Insurance  Payor: [employer] / Plan: [place] [employer] / Product Type: *No Product type* /     Plan  Outpatient Plan  OT Plan: 10/13; small FM, putty, threading small beads cards  Frequency: 1x/wk  Duration: 6 weeks  Onset Date: 24  Certification Period Start Date: 24  Certification Period End Date: 25  Number of Treatments Authorized: 13 (30v/year)  Rehab Potential: Good  Plan of Care Agreement: Patient  Planned Interventions: Therapeutic Exercise (08026), Therapeutic Activity (81174), Self-Care/Home Management (27675), Manual Therapy (59035), Neuromuscular Re-education (66018), Kinesiotaping, Hot Packs, and Cold Packs     General  OT Last Visit  OT Received On: 25  Reason for Referral: hand  "weakness  Referred By: Dr. Adorno  Primary Language: English  Preferred Learning Style: auditory, kinesthetic, verbal, visual, written    Subjective  General Comment: \"I have an interview I think tomorrow.\"  Current condition since injury: Better   Performing HEP? Yes    Precautions  Precautions  Medical Precautions: No known precautions/limitation    Pain  Pain Assessment  Pain Assessment: 0-10  0-10 (Numeric) Pain Score: 4  Pain Type: Chronic pain  Pain Location: Neck (shoulders)  As a result of session pt. Reported pain remained the same  End of session pain: 4/10    Treatment  This therapist instructed and demonstrated interventions to patient, patient completed the following under direct supervision of this therapist:  Therapeutic Exercise  Therapeutic Exercise Performed: Yes ROM and strengthening exercises completed this date. Pt. engaged in shoulder ROM stretches this date including shoulder rolls fwd/back x 10 reps, shoulder squeezes x 10 reps with 5s holds, neck lateral flexion x 5 reps with 5 holds, neck rotation x 5 reps with 5s holds, neck flexion/extension 5 reps, 5s holds.    Therapeutic Activity  Therapeutic Activity Performed: Yes Utilized weighted clips to remove pom balls from bowl. Patient utilized green weighted clip to remove the poms and returned them to tupperware with blue clip. Patient reported fatigue with exercise. However, able to coordinate task better. Patient engaged in threading task to increase coordination. Small beads used to thread onto fishing line. Patient demo'd compensation with this task. She was unable to utilize pincer grasp before threading bead. Instead patient placed bead on her palm and \"stabbed\" bead with fishing line to initiate thread. Patient reported this task was extremely difficult. Utilized safety pins x10 to clasp and unclasp in chain. Patient reported increased difficulty in task. Provided patient with small beads and green putty to increase coordination and " strength.     Education  Education  Individual(s) Educated: Patient  Education Provided: Anatomy & Physiology, Diagnosis & Precautions, POC discussed and agreed upon, Risk and benefits of OT discussed with patient or other  Equipment: Thera-putty (green)  Risk and Benefits Discussed with Patient/Caregiver/Other: yes  Patient/Caregiver Demonstrated Understanding: yes  Plan of Care Discussed and Agreed Upon: yes  Patient Response to Education: Patient/Caregiver Verbalized Understanding of Information, Patient/Caregiver Performed Return Demonstration of Exercises/Activities, Patient/Caregiver Asked Appropriate Questions    HEP Provided Today: No    Goals  Active       OT Goals       Pt. will decrease QuickDASH score by 8 points to show improved ability to participate in daily tasks with minimal difficulty/pain.  (Met)       Start:  11/07/24    Expected End:  01/31/25    Resolved:  01/28/25    QuickDASH:41 raw = 68.18      Goal Note       1/28: 35 raw = 54.55              Pt. will demo increased BUE fine motor ability as evidenced by performance on 9 hole peg 30s.  (Progressing)       Start:  11/07/24    Expected End:  01/31/25         Goal Note       RUE: 48.7s              Pt. will be able to perform self-care, household, work, and or leisure tasks with 0-2/10 pain rating, 100% of the time  (Met)       Start:  11/07/24    Expected End:  11/29/24    Resolved:  12/16/24      Goal Note       12/16: yes with UE; now that I have this leg problem its an issue               Pt. will demonstrate MI with stating and demonstrating home exercise program in order to improve patient's ability to perform self-care, household, and/or leisure tasks.  (Met)       Start:  11/07/24    Expected End:  01/03/25    Resolved:  01/28/25      Goal Note       1/28: Patient reports she is completing.               Patient will endure throughout therapy sessions x45 minutes without complaint of shoulder/neck discomfort/fatigue to perform work  "tasks.  (Met)       Start:  11/07/24    Expected End:  12/20/24    Resolved:  12/16/24      Goal Note       12/16: \"I feel good when I walk out of here.\"              Pt. will increase RUE hand strength to increase participation in self-care, household, and leisure tasks.  41 lbs;   (Not met)       Start:  12/16/24    Expected End:  01/31/25    Resolved:  01/28/25    Updated to: Pt. will increase RUE hand strength to increase participation in self-care, household, and leisure tasks.  45 lbs;    Update reason: close to meeting           Goal Note       1/28: 40.67              Pt. will increase RUE hand strength to increase participation in self-care, household, and leisure tasks.  45 lbs;       Start:  01/28/25    Expected End:  01/31/25                    JOSE Smith/TEA  "

## 2025-03-12 ENCOUNTER — APPOINTMENT (OUTPATIENT)
Dept: OCCUPATIONAL THERAPY | Facility: HOSPITAL | Age: 55
End: 2025-03-12
Payer: MEDICAID

## 2025-03-18 ENCOUNTER — DOCUMENTATION (OUTPATIENT)
Dept: OCCUPATIONAL THERAPY | Facility: HOSPITAL | Age: 55
End: 2025-03-18

## 2025-03-18 ENCOUNTER — APPOINTMENT (OUTPATIENT)
Dept: OCCUPATIONAL THERAPY | Facility: HOSPITAL | Age: 55
End: 2025-03-18
Payer: MEDICAID

## 2025-03-18 NOTE — PROGRESS NOTES
Occupational Therapy  Therapy Communication Note    Patient Name: Eva Roca  MRN: [id number]  Department:   Room: Room/bed info not found  Today's Date: 3/18/2025     Discipline: Occupational Therapy    Missed Time: No Show    Comment: Patient no showed for 1045 appt. Patient reported she forgot. Patient rescheduled for tomorrow at 1:00pm

## 2025-03-19 ENCOUNTER — TREATMENT (OUTPATIENT)
Dept: OCCUPATIONAL THERAPY | Facility: HOSPITAL | Age: 55
End: 2025-03-19
Payer: MEDICAID

## 2025-03-19 DIAGNOSIS — R27.8 COORDINATION IMPAIRMENT: ICD-10-CM

## 2025-03-19 PROCEDURE — 97530 THERAPEUTIC ACTIVITIES: CPT | Mod: GO | Performed by: OCCUPATIONAL THERAPIST

## 2025-03-19 PROCEDURE — 97110 THERAPEUTIC EXERCISES: CPT | Mod: GO | Performed by: OCCUPATIONAL THERAPIST

## 2025-03-19 ASSESSMENT — PAIN SCALES - GENERAL: PAINLEVEL_OUTOF10: 5 - MODERATE PAIN

## 2025-03-19 ASSESSMENT — PAIN - FUNCTIONAL ASSESSMENT: PAIN_FUNCTIONAL_ASSESSMENT: 0-10

## 2025-03-19 NOTE — PROGRESS NOTES
Occupational Therapy  Occupational Therapy Treatment    Patient Name: Eva Roca  MRN: [id number]  : 1970  Today's Date: 3/19/2025  Time Calculation  Start Time: 1307  Stop Time: 1342  Time Calculation (min): 35 min    Today's Charges:  OT Therapeutic Procedures Time Entry  Therapeutic Activity Time Entry: 20  Therapeutic Exercise Time Entry: 15    Total Timed Minutes: 35  Total Untimed Minutes: 0      Current Problem  Problem List Items Addressed This Visit             ICD-10-CM    Coordination impairment R27.8     Assessment  OT Assessment  OT Assessment Results: Decreased upper extremity range of motion, Decreased upper extremity strength, Decreased fine motor control, Decreased gross motor control, Decreased IADLs  Clinical Presentation: Stable and/or uncomplicated characteristics  Progress towards goals: Patient reports there has not been a significant change in functional abilities.  Patient tolerated treatment satisfactorily.  Patient appears to be demonstrating a plateau with FM coordination at this time with continued neck and shoulder pain. Patient provided with HEP at this time and instructed to call therapist if needs arise. Discharge completed today with transition to HEP.     Insurance  Payor: [employer] / Plan: [employer] / Product Type: *No Product type* /     Plan  Outpatient Plan  OT Plan: ;  Frequency: no further visits planned.  Duration: 6 weeks  Onset Date: 24  Certification Period Start Date: 24  Certification Period End Date: 25  Number of Treatments Authorized: 13 (30v/year)  Rehab Potential: Good  Plan of Care Agreement: Patient  Planned Interventions: Therapeutic Exercise (34008), Therapeutic Activity (86399), Self-Care/Home Management (26518), Manual Therapy (35586), Neuromuscular Re-education (77804), Kinesiotaping, Hot Packs, and Cold Packs     General  OT Last Visit  OT Received On: 25  Reason for  "Referral: hand weakness  Referred By: Dr. Adorno  Primary Language: English  Preferred Learning Style: auditory, kinesthetic, verbal, visual, written    Subjective  General Comment: \"i go to the surgeon tomorrow.\"  Current condition since injury: Better   Performing HEP? Yes    Precautions  Precautions  Medical Precautions: No known precautions/limitation    Pain  Pain Assessment  Pain Assessment: 0-10  0-10 (Numeric) Pain Score: 5 - Moderate pain  Pain Type: Chronic pain  Pain Location: Neck  Pain Orientation: Right, Left  As a result of session pt. Reported pain remained the same  End of session pain: 5/10    ROM/Strength  Right Hand Strength -  (lbs)  Handle Setting 2 (lbs): 40 lbs (43, 37, 40)    Treatment  This therapist instructed and demonstrated interventions to patient, patient completed the following under direct supervision of this therapist:  Therapeutic Exercise  Therapeutic Exercise Performed: Yes ROM and strengthening exercises completed this date. Utilized wrist mobilizer today x10 reps. Patient reports her biggest struggle with returning to work is maintaining her finger adduction to maintain hair positioning.  Patient reports she is weak in her fingers. Engaged patient in palmar interossei strengthening exercises with rubber band. Patient engaged in finger adduction and thumb abduction x10 reps with 10s holds. Patient engaged in finger extension/abduction 3x10 reps with resistive rubber band. Patient tolerated well.     Therapeutic Activity  Therapeutic Activity Performed: Yes Re-eval completed this date. QuickDASH completed. Goals discussed. Strength/ROM and fine motor measurements taken. Patient did not improve towards goals at this time. Goals were not met. Patient reports she has returned to work and is ready for discharge. Education on continuation with HEP and how to contact this therapist if needed. Discharge completed.     Education  Education  Individual(s) Educated: Patient  Education " Provided: Anatomy & Physiology, Diagnosis & Precautions, POC discussed and agreed upon, Risk and benefits of OT discussed with patient or other  Equipment: Thera-putty (green)  Risk and Benefits Discussed with Patient/Caregiver/Other: yes  Patient/Caregiver Demonstrated Understanding: yes  Plan of Care Discussed and Agreed Upon: yes  Patient Response to Education: Patient/Caregiver Verbalized Understanding of Information, Patient/Caregiver Performed Return Demonstration of Exercises/Activities, Patient/Caregiver Asked Appropriate Questions    HEP Provided Today: No    Outcome Measures  OT Adult Other Outcome Measures  9 Hole Peg Test: RUE:52.4 LUE: 41s  Other Outcome Measures: QuickDASH: 33 raw = 50.00    Goals  Active       OT Goals       Pt. will decrease QuickDASH score by 8 points to show improved ability to participate in daily tasks with minimal difficulty/pain.  (Met)       Start:  11/07/24    Expected End:  01/31/25    Resolved:  01/28/25    QuickDASH:41 raw = 68.18      Goal Note       1/28: 35 raw = 54.55              Pt. will demo increased BUE fine motor ability as evidenced by performance on 9 hole peg 30s.  (Not Progressing)       Start:  11/07/24    Expected End:  01/31/25         Goal Note       52.4s               Pt. will be able to perform self-care, household, work, and or leisure tasks with 0-2/10 pain rating, 100% of the time  (Met)       Start:  11/07/24    Expected End:  11/29/24    Resolved:  12/16/24      Goal Note       12/16: yes with UE; now that I have this leg problem its an issue               Pt. will demonstrate MI with stating and demonstrating home exercise program in order to improve patient's ability to perform self-care, household, and/or leisure tasks.  (Met)       Start:  11/07/24    Expected End:  01/03/25    Resolved:  01/28/25      Goal Note       1/28: Patient reports she is completing.               Patient will endure throughout therapy sessions x45 minutes without  "complaint of shoulder/neck discomfort/fatigue to perform work tasks.  (Met)       Start:  11/07/24    Expected End:  12/20/24    Resolved:  12/16/24      Goal Note       12/16: \"I feel good when I walk out of here.\"              Pt. will increase RUE hand strength to increase participation in self-care, household, and leisure tasks.  41 lbs;   (Not met)       Start:  12/16/24    Expected End:  01/31/25    Resolved:  01/28/25    Updated to: Pt. will increase RUE hand strength to increase participation in self-care, household, and leisure tasks.  45 lbs;    Update reason: close to meeting           Goal Note       1/28: 40.67              Pt. will increase RUE hand strength to increase participation in self-care, household, and leisure tasks.  45 lbs; (Not Progressing)       Start:  01/28/25    Expected End:  01/31/25             Goal Note       40lbs - unchanged from original assessment                   Ami Felix OTR/L      "

## (undated) DEVICE — Device

## (undated) DEVICE — GLOVE, SURGICAL, PROTEXIS NEOPRENE, 8.5, PF, LF

## (undated) DEVICE — GLOVE, SURGICAL, PROTEXIS PI ORTHO, 8.5, PF, LF

## (undated) DEVICE — DRAPE PACK, LOWER EXTREMITY, CUSTOM, GENEVA

## (undated) DEVICE — PAD, GROUNDING, ELECTROSURGICAL, PATIENT PLATE, W/O CORD, ADULT LARGE

## (undated) DEVICE — PADDING, UNDERCAST, WEBRIL II, 6 IN X 4 YD, CRIMP, NS

## (undated) DEVICE — SOLUTION, IRRIGATION, USP, SODIUM CHLORIDE 0.9%, 3000 ML

## (undated) DEVICE — SOLUTION, INJECTION, USP, SODIUM CHLORIDE 0.9%, .9, NACL, 1000 ML, BAG

## (undated) DEVICE — SOLUTION, IRRIGATION, SODIUM CHLORIDE 0.9%, 1000 ML, POUR BOTTLE

## (undated) DEVICE — SOLUTION, IRRIGATION, STERILE WATER, 1000 ML, POUR BOTTLE

## (undated) DEVICE — BANDAGE, GAUZE, CONFORMING, KERLIX, 6 PLY, 4.5 IN X 4.1 YD

## (undated) DEVICE — DRAPE, C-ARMOR KIT

## (undated) DEVICE — OINTMENT, BACITRACIN, W/ZINC, 1 OZ

## (undated) DEVICE — BANDAGE, ESMARK 4 IN X 9 FT, STERILE

## (undated) DEVICE — DRESSING, NON-ADHERENT, OIL EMULSION, CURITY, 3 X 8 IN, STERILE

## (undated) DEVICE — BUR, SOLID ROUND, CARBIDE, LONG, 4.0MM

## (undated) DEVICE — DRESSING, NON-ADHERENT, 3 X 3 IN, STERILE

## (undated) DEVICE — PREP, SCRUB, SKIN, FOAM, HIBICLENS, 4 OZ

## (undated) DEVICE — GOWN, ASTOUND, XL

## (undated) DEVICE — TRAY, DRY PREP, PREMIUM

## (undated) DEVICE — GLOVE, SURGICAL, PROTEXIS PI , 8.5, PF, LF

## (undated) DEVICE — BANDAGE, COMPRESSION, W/CLIP, FLEX-MASTER, DOUBLE LENGTH, 6 IN X 11 YD, LF